# Patient Record
Sex: FEMALE | Race: WHITE | NOT HISPANIC OR LATINO | Employment: FULL TIME | URBAN - METROPOLITAN AREA
[De-identification: names, ages, dates, MRNs, and addresses within clinical notes are randomized per-mention and may not be internally consistent; named-entity substitution may affect disease eponyms.]

---

## 2017-01-11 ENCOUNTER — GENERIC CONVERSION - ENCOUNTER (OUTPATIENT)
Dept: OTHER | Facility: OTHER | Age: 37
End: 2017-01-11

## 2017-03-08 ENCOUNTER — TRANSCRIBE ORDERS (OUTPATIENT)
Dept: ADMINISTRATIVE | Facility: HOSPITAL | Age: 37
End: 2017-03-08

## 2017-03-08 ENCOUNTER — GENERIC CONVERSION - ENCOUNTER (OUTPATIENT)
Dept: OTHER | Facility: OTHER | Age: 37
End: 2017-03-08

## 2017-03-08 ENCOUNTER — APPOINTMENT (OUTPATIENT)
Dept: LAB | Facility: MEDICAL CENTER | Age: 37
End: 2017-03-08
Payer: COMMERCIAL

## 2017-03-08 DIAGNOSIS — E10.65 TYPE 1 DIABETES MELLITUS WITH HYPERGLYCEMIA (HCC): ICD-10-CM

## 2017-03-08 DIAGNOSIS — E55.9 VITAMIN D DEFICIENCY: ICD-10-CM

## 2017-03-08 DIAGNOSIS — E16.2 HYPOGLYCEMIA: ICD-10-CM

## 2017-03-08 LAB
25(OH)D3 SERPL-MCNC: 27.4 NG/ML (ref 30–100)
ALBUMIN SERPL BCP-MCNC: 4.1 G/DL (ref 3.5–5)
ANION GAP SERPL CALCULATED.3IONS-SCNC: 7 MMOL/L (ref 4–13)
BUN SERPL-MCNC: 9 MG/DL (ref 5–25)
CALCIUM SERPL-MCNC: 8.6 MG/DL (ref 8.3–10.1)
CHLORIDE SERPL-SCNC: 103 MMOL/L (ref 100–108)
CO2 SERPL-SCNC: 30 MMOL/L (ref 21–32)
CREAT SERPL-MCNC: 0.68 MG/DL (ref 0.6–1.3)
EST. AVERAGE GLUCOSE BLD GHB EST-MCNC: 189 MG/DL
GFR SERPL CREATININE-BSD FRML MDRD: >60 ML/MIN/1.73SQ M
GLUCOSE SERPL-MCNC: 154 MG/DL (ref 65–140)
HBA1C MFR BLD: 8.2 % (ref 4.2–6.3)
PHOSPHATE SERPL-MCNC: 3.2 MG/DL (ref 2.7–4.5)
POTASSIUM SERPL-SCNC: 4.1 MMOL/L (ref 3.5–5.3)
PTH-INTACT SERPL-MCNC: 59.7 PG/ML (ref 14–72)
SODIUM SERPL-SCNC: 140 MMOL/L (ref 136–145)

## 2017-03-08 PROCEDURE — 82306 VITAMIN D 25 HYDROXY: CPT

## 2017-03-08 PROCEDURE — 83036 HEMOGLOBIN GLYCOSYLATED A1C: CPT

## 2017-03-08 PROCEDURE — 36415 COLL VENOUS BLD VENIPUNCTURE: CPT

## 2017-03-08 PROCEDURE — 80069 RENAL FUNCTION PANEL: CPT

## 2017-03-08 PROCEDURE — 83970 ASSAY OF PARATHORMONE: CPT

## 2017-03-13 ENCOUNTER — ALLSCRIPTS OFFICE VISIT (OUTPATIENT)
Dept: OTHER | Facility: OTHER | Age: 37
End: 2017-03-13

## 2017-03-13 LAB — GLUCOSE SERPL-MCNC: 241 MG/DL

## 2017-04-09 ENCOUNTER — OFFICE VISIT (OUTPATIENT)
Dept: URGENT CARE | Facility: MEDICAL CENTER | Age: 37
End: 2017-04-09
Payer: COMMERCIAL

## 2017-04-09 PROCEDURE — 99213 OFFICE O/P EST LOW 20 MIN: CPT

## 2017-06-13 DIAGNOSIS — Z96.41 PRESENCE OF INSULIN PUMP: ICD-10-CM

## 2017-06-13 DIAGNOSIS — E55.9 VITAMIN D DEFICIENCY: ICD-10-CM

## 2017-06-13 DIAGNOSIS — E10.65 TYPE 1 DIABETES MELLITUS WITH HYPERGLYCEMIA (HCC): ICD-10-CM

## 2017-06-13 DIAGNOSIS — E16.2 HYPOGLYCEMIA: ICD-10-CM

## 2017-06-13 DIAGNOSIS — F32.9 MAJOR DEPRESSIVE DISORDER, SINGLE EPISODE: ICD-10-CM

## 2017-06-13 DIAGNOSIS — K90.0 CELIAC DISEASE: ICD-10-CM

## 2017-09-13 ENCOUNTER — GENERIC CONVERSION - ENCOUNTER (OUTPATIENT)
Dept: OTHER | Facility: OTHER | Age: 37
End: 2017-09-13

## 2017-10-17 ENCOUNTER — LAB CONVERSION - ENCOUNTER (OUTPATIENT)
Dept: OTHER | Facility: OTHER | Age: 37
End: 2017-10-17

## 2017-10-17 LAB
25(OH)D3 SERPL-MCNC: 49 NG/ML (ref 30–100)
ALBUMIN SERPL BCP-MCNC: 4.6 G/DL (ref 3.6–5.1)
BUN SERPL-MCNC: 8 MG/DL (ref 7–25)
BUN/CREA RATIO (HISTORICAL): ABNORMAL (CALC) (ref 6–22)
CALCIUM SERPL-MCNC: 9.5 MG/DL (ref 8.6–10.2)
CALCIUM SERPL-MCNC: 9.5 MG/DL (ref 8.6–10.2)
CHLORIDE SERPL-SCNC: 103 MMOL/L (ref 98–110)
CHOLEST SERPL-MCNC: 143 MG/DL
CHOLEST/HDLC SERPL: 2.6 (CALC)
CO2 SERPL-SCNC: 29 MMOL/L (ref 20–31)
CREAT SERPL-MCNC: 0.73 MG/DL (ref 0.5–1.1)
CREATININE, RANDOM URINE (HISTORICAL): 189 MG/DL (ref 20–320)
EGFR AFRICAN AMERICAN (HISTORICAL): 123 ML/MIN/1.73M2
EGFR-AMERICAN CALC (HISTORICAL): 106 ML/MIN/1.73M2
GLUCOSE (HISTORICAL): 106 MG/DL (ref 65–99)
HBA1C MFR BLD HPLC: 8.6 % OF TOTAL HGB
HDLC SERPL-MCNC: 55 MG/DL
LDL CHOLESTEROL (HISTORICAL): 75 MG/DL (CALC)
MAGNESIUM, UR (HISTORICAL): 0.7 MG/DL
MICROALBUMIN/CREATININE RATIO (HISTORICAL): 4 MCG/MG CREAT
NON-HDL-CHOL (CHOL-HDL) (HISTORICAL): 88 MG/DL (CALC)
PHOSPHATE SERPL-MCNC: 3.4 MG/DL (ref 2.5–4.5)
POTASSIUM SERPL-SCNC: 4.1 MMOL/L (ref 3.5–5.3)
PTH-INTACT SERPL-MCNC: 38 PG/ML (ref 14–64)
SODIUM SERPL-SCNC: 139 MMOL/L (ref 135–146)
T4 FREE SERPL-MCNC: 1.1 NG/DL (ref 0.8–1.8)
TRIGL SERPL-MCNC: 53 MG/DL
TSH SERPL DL<=0.05 MIU/L-ACNC: 1.94 MIU/L

## 2017-10-19 ENCOUNTER — ALLSCRIPTS OFFICE VISIT (OUTPATIENT)
Dept: OTHER | Facility: OTHER | Age: 37
End: 2017-10-19

## 2017-10-24 NOTE — PROGRESS NOTES
Assessment  1  Type 1 diabetes mellitus with hyperglycemia, with long-term current use of insulin   (250 01,790 29) (E10 65)   2  Hypoglycemia (251 2) (E16 2)   3  Insulin pump in place (V45 85) (Z96 41)   4  Vitamin D deficiency disease (268 9) (E55 9)    Plan  Hypoglycemia    · Glucagon Emergency 1 MG Injection Kit; USE AS DIRECTED   Rx By: Deborah Durán; Dispense: 0 Days ; #:2 Kit; Refill: 1;For: Hypoglycemia; MAGGY = N; Sent To: [facility]/PHARMACY #[id number] Hypoglycemia, Insulin pump in place, Type 1 diabetes mellitus with hyperglycemia, with  long-term current use of insulin, Vitamin D deficiency disease    · (1) HEMOGLOBIN A1C; Status:Active; Requested QIF:[id number]; Perform:[facility]; FPZ:[id number];FOQUJUE;NXY:[id number], Insulin pump in place, Type 1 diabetes mellitus with hyperglycemia, with long-term current use of insulin, Vitamin D deficiency disease; Ordered By:Jenny Gill;   · (1) RENAL FUNCTION PANEL; Status:Active; Requested QZU:[id number]; Perform:[facility]; CKP:[id number];IAVRHHB;WBS:[id number], Insulin pump in place, Type 1 diabetes mellitus with hyperglycemia, with long-term current use of insulin, Vitamin D deficiency disease; Ordered By:Jenny Gill;   · Follow-up visit in 3 months Evaluation and Treatment  Follow-up  Status: Hold For -  Scheduling  Requested for: [id number]   Ordered; For: Hypoglycemia, Insulin pump in place, Type 1 diabetes mellitus with hyperglycemia, with long-term current use of insulin, Vitamin D deficiency disease; Ordered By: Deborah Durán Performed:  Due: 24Oct2017  Type 1 diabetes mellitus with hyperglycemia, with long-term current use of insulin    · Ella Contour Next Test In Vitro Strip; CHECK 8 TIMES DAILY AS DIRECTED   Rx By: Deborah Durán; Dispense: 90 Days ; #:8 X 100 Strip Box;  Refill: 1;For: Type 1 diabetes mellitus with hyperglycemia, with long-term current use of insulin; MAGGY = N; Verified Transmission to [facility]/PHARMACY #[id number]; Msg to Pharmacy: COMPATIBLE WITH NEW INSULIN PUMP; Last Updated By: [employer] Og; 10/19/2017 11:27:42 AM    Discussion/Summary  Discussion Summary:   1  Type 1 diabetes with hyperglycemia and hypoglycemia with long term insulin/insulin pump in place- last A1c 8 6 not goal, keep appointment with educator to set up new Medtronic 670 insulin pump, continue Novolog via insulin pump  Try to dose insulin before meals  Always have glucose available for hypoglycemia, use 15 by 15 rule  Glucagon reordered  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, works with nurses  Schedule yearly eye exam and send us a copy of report  Next due in January 2018  BP at goal  Urine microalbumin normal  TSH and free T4 normal   Vitamin D deficiency- last D level normal without supplements  3 months  Counseling Documentation With Imm: The patient was counseled regarding diagnostic results,-- instructions for management,-- risk factor reductions,-- patient and family education,-- impressions,-- importance of compliance with treatment  Goals and Barriers: The patient has the current Goals: As above  Patient's Capacity to Self-Care: Patient is able to Self-Care  Medication SE Review and Pt Understands Tx: Possible side effects of new medications were reviewed with the patient/guardian today  The treatment plan was reviewed with the patient/guardian  The patient/guardian understands and agrees with the treatment plan      Chief Complaint  Chief Complaint Free Text Note Form: f/u diabetes   Chief Complaint Chronic Condition [facility]: Patient is here today for follow up of chronic conditions described in HPI  History of Present Illness  HPI: Pedrito Marlow is a 41 yo female here for follow-up type 1 diabetes and vitamin D deficiency  Quit smoking 9/9/17, using Nicotine patch  On Novolog via Medtronic 630G insulin pump and today will upgrade today to 670  D deficiency- not on supplements, last D level normal  disease- follows gluten free diet, follows GI  Vitamin D Deficiency: The patient is being seen for follow-up of vitamin D deficiency  Disease type: vitamin D insufficiency  Recent laboratory results: date 10/2017, 25-hydroxyvitamin D 49 ng/mL  Current treatment includes dietary calcium  Symptoms:  fatigue, but-- no bone pain,-- no muscle pain-- and-- no muscle weakness  Diabetes: The patient is being seen for routine follow-up of Diabetes Mellitus 1  The HbA1c was 8 6% performed on 10/2017  Current treatment includes NovoLog  See Medication List for current medication(s)  See Medication List for dosage(s)  Source of information reported and indicates that the patient checks her blood sugar 4-7 time(s) per day  , has entered carbs under glucose readings  Diabetes education performed   Topics covered in the education included reviewed hyperglycemic and hypoglycemic signs and symptoms-- and-- blood glucose monitoring  By report, there is fair compliance with treatment-- and-- poor symptom control  Current pertinent lifestyle factors include a previous history of smoking years-- and-- recent weight gain, but-- no obesity,-- no past or present history of currently smoking per day-- and-- no tobacco use  Symptoms reported by the patient include fatigue, but-- no abdominal pain,-- no extremity pain,-- no polydipsia,-- no extremity numbness,-- no polyuria,-- no extremity paresthesias,-- no blurred vision,-- no insomnia,-- no vomiting,-- no recent weight gain,-- no nausea,-- no recent weight loss,-- no polyphagia,-- no edema,-- no nocturia,-- no dyspnea-- and-- no chest pain  Hypoglycemia symptoms include anxiety,-- confusion-- and-- sweating  Hypoglycemia episodes are moderate in severity and managed by drinking juice  (lowest 50's) Pertinent Medical and Social History: hyperglycemia, but-- no thyroid disorder  Review of Systems  ROS Reviewed:   ROS reviewed         Endo Adult ROS Female Established v2 Update - [facility]:   Constitutional/General: recent weight gain,-- no recent weight loss,-- poor energy/fatigue,-- no increased energy level,-- no insomnia/sleep problems,-- no fever-- and-- no feeling weak  Breasts: no nipple discharge  Heart: no high blood pressure,-- no chest pain/tightness,-- no rapid/racing heart rate-- and-- no palpitations  Genitourinary - Urinary: no frequent urination,-- no excess urination-- and-- no urinating during the night  Eyes: no blurred vision,-- no double vision,-- no bulging eyes,-- no gritty/scratchy eyes-- and-- no excessive tearing  Mouth / Throat: no hoarseness-- and-- no difficulty swallowing  Neck: no lumps,-- no swollen glands,-- no neck pain,-- no neck stiffness-- and-- no enlarged thyroid  Respiratory: no wheezing,-- no asthma-- and-- no persistent cough  Musculoskeletal: no muscle aches/pain,-- no joint aches/pain-- and-- no muscle weakness  Skin & Hair: no dry skin,-- acne,-- the hair texture was not oily,-- no hair loss-- and-- no excessive hair growth  Gastrointestinal: no constipation,-- no diarrhea,-- no waking at night to drink-- and-- no stomach ache  Neurological: no blackouts,-- no weakness-- and-- no tremors  Reproductive: mood swings--   frequency of period is not applicable  -- duration of period is not applicable  -- Date of last menstruation is not applicable  -- regular periods are not applicable  -- discomfort with periods is not applicable  -- excessive bleeding during period is not applicable  Endocrine: no feeling hot frequently,-- no feeling cold frequently,-- no shifts between feeling hot and cold,-- no cold hands or feet,-- no excessive sweating,-- no thyroid problems,-- blood sugar problems,-- no excessive thirst,-- no excessive hunger,-- no change in shoe size,-- no nausea or vomiting-- and-- no shaky hands  Active Problems  1  Abdominal pain, RUQ (789 01) (R10 11)   2  Abnormal glucose (790 29) (R73 09)   3  Acute bronchitis (466 0) (J20 9)   4   Acute maxillary sinusitis (461 0) (J01 00)   5  Acute Otitis Externa Of The Right Ear (380 22)   6  Acute sinusitis (461 9) (J01 90)   7  Anxiety (300 00) (F41 9)   8  Asthmatic bronchitis with exacerbation (493 92) (J45 901)   9  Celiac disease (579 0) (K90 0)   10  Controlled diabetes mellitus type II without complication (061 76) (U43 4)   11  Cough (786 2) (R05)   12  Depression (311) (F32 9)   13  Dizziness (780 4) (R42)   14  Encounter for smoking cessation counseling (V65 42,305 1) (Z71 6,Z72 0)   15  Exposure to influenza (V01 79) (Z20 828)   16  Fatigue (780 79) (R53 83)   17  Hypoglycemia (251 2) (E16 2)   18  Insulin pump in place (V45 85) (Z96 41)   19  Noncompliance (V15 81) (Z91 19)   20  Other acute sinusitis (461 8) (J01 80)   21  Pap smear for cervical cancer screening (V76 2) (Z12 4)   22  Type 1 diabetes mellitus with hyperglycemia, with long-term current use of insulin    (250 01,790 29) (E10 65)   23  Type I diabetes mellitus, uncontrolled (250 03) (E10 65)   24  Upper respiratory tract infection, unspecified type (465 9) (J06 9)   25  Vertigo (780 4) (R42)   26  Vitamin D deficiency disease (268 9) (E55 9)    Past Medical History  1  History of Depression (311) (F32 9)   2  Encounter for smoking cessation counseling (V65 42,305 1) (Z71 6,Z72 0)   3  History of hypertension (V12 59) (Z86 79)  Active Problems And Past Medical History Reviewed: The active problems and past medical history were reviewed and updated today  Surgical History  1  Denied: History Of Prior Surgery  Surgical History Reviewed: The surgical history was reviewed and updated today  Family History  Mother    1  No pertinent family history  Maternal Grandfather    2  Family history of Malignant Pancreatic Neoplasm  Paternal Grandfather    3  Family history of Stroke Syndrome (V17 1)  Family History    4  Family history of Family Health Status Of Father - Alive   5   Family history of Family Health Status Of Mother - Alive  Family History Reviewed: The family history was reviewed and updated today  Social History   · Being A Social Drinker   · Caffeine Use   · Denied: History of Drug Use   · Quit smoking within past year (V15 82) (I32 284)    Current Meds   1  Ella Contour Next Test In Vitro Strip; CHECK 8 TIMES DAILY AS DIRECTED; Therapy: 21Apr2017 to (Evaluate:18Oct2017)  Requested for: 14Omg0374; Last   Rx:21Apr2017 Ordered   2  BD Insulin Syringe Ultrafine 31G X 5/16 1 ML Miscellaneous; USE ONE DAILY; Therapy: 79YUB7539 to (Evaluate:13Jun2015)  Requested for: 97WMY2869; Last   Rx:81Hgj8261 Ordered   3  Glucagon Emergency 1 MG Injection Kit; USE AS DIRECTED; Therapy: 81IEG1447 to (Last Rx:45Cmf5697)  Requested for: 48Mja4822 Ordered   4  Levemir FlexTouch 100 UNIT/ML Subcutaneous Solution Pen-injector; Inject SQ 10 units   at 8am and 10 units at 8pm in case of pump failure; Therapy: 78KBI3436 to (Evaluate:06Jan2017)  Requested for: 77QVU4118; Last   Rx:57Qyy1573 Ordered   5  Mirena IUD; Therapy: [phone]) to Recorded   6  Nova Max Plus [employer]; use as directed; Therapy: 21Mar2015 to (Hannah Long)  Requested for: 17AYA4455; Last   Rx:83Mii8635; Status: [address] moira Alvarado Ordered   7  NovoLOG 100 UNIT/ML Subcutaneous Solution; USE UP TO 50 UNITS VIA PUMP DAILY; Therapy: 80PAA6657 to (DPFHRWRL:85QUW5762)  Requested for: 70Svn3549; Last   Rx:90Kwq8333 Ordered   8  NovoLOG FlexPen 100 UNIT/ML Subcutaneous Solution Pen-injector; Inject SQ with   meals 1 unit per 18 carbs  up to 50 units daily as   directed by provider in case of pump failure; Therapy: 98BVH8945 to (Evaluate:06Jan2017)  Requested for: 64AVL4534; Last   Rx:90Gya3308; Status: ACTIVE - Renewal Denied Ordered   9  Vitamin D 2000 UNIT Oral Capsule; 2 CAPSULES DAILY; Therapy: 69DZO6356 to Recorded  Medication List Reviewed: The medication list was reviewed and updated today  Allergies  1  No Known Drug Allergies    Vitals  Vital Signs    Recorded: 92XII5508 10:52AM   Heart Rate 78   Systolic 162   Diastolic 68   Height 5 ft 1 in   Weight 132 lb    BMI Calculated 24 94   BSA Calculated 1 58     Physical Exam    Constitutional   General appearance: No acute distress, well appearing and well nourished  Eyes   Conjunctiva and lids: No swelling, erythema, or discharge  Pupils: Equal, round and reactive to light  The sclera are anicteric  Extraocular movements are intact  Ears, Nose, Mouth, and Throat   External inspection of ears, nose and lips: Normal     Oropharynx: Normal with no erythema, edema, exudate or lesions  Exam of Head: The head is atraumatic and normocephalic  Neck: The neck is supple  The thyroid is normal in size with no palpable nodules  Pulmonary   Auscultation of lungs: Clear to auscultation bilaterally with normal chest expansion  Cardiovascular   Auscultation of heart: Normal rate and rhythm with no murmurs, gallops or rubs  Examination of pulses: Dorsalis pedal pulses are +2 and equal bilaterally  Examination of carotids: No bruit    Abdomen   Abdomen: Abdomen is soft, non-tender with normal bowel sounds  Lymphatic   Palpation of lymph nodes: No supraclavicular or suboccipital lymphadenopathy  Musculoskeletal   Inspection/palpation of joints, bones, and muscles: Muscle bulk and tone is normal     Skin   Skin and subcutaneous tissue: Normal skin temperature and color  Neurologic   Reflexes: 2+ and symmetric  Motor Strength: Strength is 5/5 bilaterally      Psychiatric   Orientation to person, place and time: Normal     Mood and affect: Affect and attention span are normal        Results/Data  (Q) PTH, INTACT AND CALCIUM 14Oct2017 08:19AM Nakul Speed     Test Name Result Flag Reference   PARATHYROID HORMONE,$INTACT 38 pg/mL  14-64   Interpretive Guide    Intact PTH           Calcium  ------------------    ----------           -------  Normal Parathyroid    Normal               Normal  Hypoparathyroidism    Low or Low Normal    Low  Hyperparathyroidism     Primary            Normal or High       High     Secondary          High                 Normal or Low     Tertiary           High                 High  Non-Parathyroid     Hypercalcemia      Low or Low Normal    High   CALCIUM 9 5 mg/dL  8 6-10 2     (1) LIPID PANEL, FASTING 14Oct2017 08:19AM Nava Barker     Test Name Result Flag Reference   CHOLESTEROL, TOTAL 143 mg/dL  <200   HDL CHOLESTEROL 55 mg/dL  >00   TRIGLICERIDES 53 mg/dL  <392   LDL-CHOLESTEROL 75 mg/dL (calc)     Reference range: <100     Desirable range <100 mg/dL for patients with CHD or  diabetes and <70 mg/dL for diabetic patients with  known heart disease  LDL-C is now calculated using the Ramu-Velazuqez   calculation, which is a validated novel method providing   better accuracy than the Friedewald equation in the   estimation of LDL-C  Adeline Evans  [facility]4;847(30): 0017-0687   (http://[employer]/faq/DVR675)   CHOL/HDLC RATIO 2 6 (calc)  <5 0   NON HDL CHOLESTEROL 88 mg/dL (calc)  <130   For patients with diabetes plus 1 major ASCVD risk   factor, treating to a non-HDL-C goal of <100 mg/dL   (LDL-C of <70 mg/dL) is considered a therapeutic   option  (1) RENAL FUNCTION PANEL 14Oct2017 08:19AM Nava Barker     Test Name Result Flag Reference   GLUCOSE 106 mg/dL H 65-99   Fasting reference interval     For someone without known diabetes, a glucose value  between 100 and 125 mg/dL is consistent with  prediabetes and should be confirmed with a  follow-up test    UREA NITROGEN (BUN) 8 mg/dL  7-25   CREATININE 0 73 mg/dL  0 50-1 10   eGFR NON-AFR   AMERICAN 106 mL/min/1 73m2  > OR = 60   eGFR AFRICAN AMERICAN 123 mL/min/1 73m2  > OR = 60   BUN/CREATININE RATIO   8-19   NOT APPLICABLE (calc)   SODIUM 139 mmol/L  135-146   POTASSIUM 4 1 mmol/L  3 5-5 3   CHLORIDE 103 mmol/L     CARBON DIOXIDE 29 mmol/L  20-31   CALCIUM 9 5 mg/dL  8 6-10 2   PHOSPHATE (AS PHOSPHORUS) 3 4 mg/dL  2 5-4 5   ALBUMIN 4 6 g/dL  3 6-5 1     (Q) MICROALBUMIN, RANDOM URINE (W/CREATININE) 26YQA9657 08:19AM Mervin Desir     Test Name Result Flag Reference   CREATININE, RANDOM URINE 189 mg/dL     MICROALBUMIN 0 7 mg/dL     Reference Range  Not established   MICROALBUMIN/CREATININE$RATIO, RANDOM URINE 4 mcg/mg creat  <30   The ADA defines abnormalities in albumin  excretion as follows:     Category         Result (mcg/mg creatinine)     Normal                    <30  Microalbuminuria            Clinical albuminuria   > OR = 300     The ADA recommends that at least two of three  specimens collected within a 3-6 month period be  abnormal before considering a patient to be  within a diagnostic category  (1) T4, FREE 14Oct2017 08:19AM Mervin Desir     Test Name Result Flag Reference   T4, FREE 1 1 ng/dL  0 8-1 8     (Q) TSH, 3RD GENERATION 14Oct2017 08:19AM Mervin Desir     Test Name Result Flag Reference   TSH 1 94 mIU/L     Reference Range                         > or = 20 Years  0 40-4 50                              Pregnancy Ranges            First trimester    0 26-2 66            Second trimester   0 55-2 73            Third trimester    0 43-2 91     *(Q) VITAMIN D, 25-HYDROXY, LC/MS/MS 14Oct2017 08:19AM Mervin Desir     Test Name Result Flag Reference   VITAMIN D, 25-OH, TOTAL 49 ng/mL     Vitamin D Status         25-OH Vitamin D:     Deficiency:                    <20 ng/mL  Insufficiency:             20 - 29 ng/mL  Optimal:                 > or = 30 ng/mL     For 25-OH Vitamin D testing on patients on   D2-supplementation and patients for whom quantitation   of D2 and D3 fractions is required, the QuestAssureD(TM)  25-OH VIT D, (D2,D3), LC/MS/MS is recommended: order   code 51302 (patients >2yrs)  For more information on this test, go to:  http://[employer]faq/UAO464  (This link is being provided for   informational/educational purposes only )     (Q) HEMOGLOBIN A1c 14Oct2017 08:19AM Herson Dong   REPORT COMMENT:  NO INS CARD  FASTING:YES     Test Name Result Flag Reference   HEMOGLOBIN A1c 8 6 % of total Hgb H <5 7   For someone without known diabetes, a hemoglobin A1c  value of 6 5% or greater indicates that they may have   diabetes and this should be confirmed with a follow-up   test      For someone with known diabetes, a value <7% indicates   that their diabetes is well controlled and a value   greater than or equal to 7% indicates suboptimal   control  A1c targets should be individualized based on   duration of diabetes, age, comorbid conditions, and   other considerations  Currently, no consensus exists regarding use of  hemoglobin A1c for diagnosis of diabetes for children         Signatures   Electronically signed by : Sy Oneill; Oct 19 2017 11:42AM EST                       (Author)    Electronically signed by : LILA Garrett ; Oct 23 2017 12:37PM EST

## 2018-01-10 NOTE — RESULT NOTES
Verified Results  DSMT/MNT Time Record [id number] 01:51PM Karen Holden     Test Name Result Flag Reference   Date of Service 12/19/16     Start - Stop Time 9:00 am - 9:45 am     Total MInutes 45     Group Or Individual Instruction ind

## 2018-01-10 NOTE — RESULT NOTES
Message   Reviewed at visit  Verified Results  (1) HEMOGLOBIN A1C 08Aug2016 04:01PM 1-4 All Order Number: [id number]     Test Name Result Flag Reference   HEMOGLOBIN A1C 9 4 % H 4 2-6 3   EST  AVG  GLUCOSE 223 mg/dl       (1) COMPREHENSIVE METABOLIC PANEL 46UWH8362 37:87ND 1-4 All Order Number: [id number]     Test Name Result Flag Reference   GLUCOSE,RANDM 292 mg/dL H    If the patient is fasting, the ADA then defines impaired fasting glucose as > 100 mg/dL and diabetes as > or equal to 123 mg/dL  SODIUM 135 mmol/L L 136-145   POTASSIUM 4 2 mmol/L  3 5-5 3   CHLORIDE 101 mmol/L  100-108   CARBON DIOXIDE 26 mmol/L  21-32   ANION GAP (CALC) 8 mmol/L  4-13   BLOOD UREA NITROGEN 11 mg/dL  5-25   CREATININE 0 75 mg/dL  0 60-1 30   Standardized to IDMS reference method   CALCIUM 9 0 mg/dL  8 3-10 1   BILI, TOTAL 0 35 mg/dL  0 20-1 00   ALK PHOSPHATAS 56 U/L     ALT (SGPT) 22 U/L  12-78   AST(SGOT) 13 U/L  5-45   ALBUMIN 4 2 g/dL  3 5-5 0   TOTAL PROTEIN 7 2 g/dL  6 4-8 2   eGFR Non-African American      >60 0 ml/min/1 73sq [facility] Disease Education Program recommendations are as follows:  GFR calculation is accurate only with a steady state creatinine  Chronic Kidney disease less than 60 ml/min/1 73 sq  meters  Kidney failure less than 15 ml/min/1 73 sq  meters  (1) T4, FREE 81Pkb2227 04:01PM 1-4 All Order Number: [id number]     Test Name Result Flag Reference   T4,FREE 0 84 ng/dL  0 76-1 46     (1) TSH 08Aug2016 04:01PM 1-4 All Order Number: [id number]     Test Name Result Flag Reference   TSH 2 010 uIU/mL  0 358-3 740   Patients undergoing fluorescein dye angiography may retain small amounts of fluorescein in the body for 48-72 hours post procedure  Samples containing fluorescein can produce falsely depressed TSH values  If the patient had this procedure,a specimen should be resubmitted post fluorescein clearance            The recommended reference ranges for TSH during pregnancy are as follows:  First trimester 0 1 to 2 5 uIU/mL  Second trimester  0 2 to 3 0 uIU/mL  Third trimester 0 3 to 3 0 uIU/m

## 2018-01-11 NOTE — PROGRESS NOTES
Chief Complaint  Chief Complaint Free Text Note Form: Follow up appointment - DexCom sensor      Discussion/Summary  Dexcom Training Form- Arthur Pennyay:   Dexcom Training Checklist   Device Model:  Understanding Glucose Sensing   Difference between sensor glucose and blood glucose  Calibration process and frequency  Difference between sensor glucose and blood glucose  Device Set-up   Initial 3-hour charging period, lasts 3-5 days  Set date and time  Set Transmitter ID if necessary  Ensure transmitter and receive are communicating (down arrow icon in bottom right corner of display  Sensor Insertion   Identify abdominal insertion sites; at least 3 inches from injection or pump infusion site  Cleanse skin with alcohol only, no barrier or adhesive wipes  Check sensor expiration date  Remove release paper from sensor pod dressing  Demonstrate correct (horizontal) positioning of sensor pod on skin  Remove safety lock, push plunger with fingers above sensor colla  Pull back collar, disengage applicator from sensor pod  Clean transmitter with alcohol and allow to dry  Place transmitter into sensor pod  Move latch forward until 2 clicks are heard  Twist off latch  Starting Sensor Session   Verify down arrow icon in lower right of  display  Press OK  Scroll down to Supersonic  Press OK  Screen will indicate 2-hour start-up period  Calibration   Take 2 fingerstick readings when the double blood-drop appears on screen  Wash hands prior to testing  Use same meter for all calibrations  Fingersticks only; no alternate sites  Enter each reading by scrolling to "Enter BG", entering number, the press OK to confirm  Repeat  Enter one callibration at least every 12 hours  Enter reading within 5 minutes of blood test     Don't calibrate with down arrow or ? ?? on display, or if blood glucose is < 40 or > 400     Reading Continuous Glucose Monitring Data   1, 3, 6, 12, & 24-hour graphs  Trand arrows; based on past 15-20 minutes  Terminating Sensor Session   Automatic shut-off after 7 days: "Change sensor now"  Manual shut-off before 7 days: "Stop Sensor" menu option  Loosen adhesive and peel off sensor pod  Placing pod on flat surface, remove transmitter  Place transmitter in a safe place if not starting a new sensor session immediately  Safety Maintenance   Avoid acetaminophen-containg medications  Remove before MRI  Clean  with slightly damp clot  Clean transmitter with alcohol between uses  Certified Sensor Trainer: Stephane Sanchez   Date: 12/19/16   Setting Threshold Alerts   Main menu--> Hi/Lo Alerts  Use Up or Down arrows to set glucose levels  Set snooze times:  Automatic Low Glucose Alarm at 55, cannot be changed or disabled  Enable Out of Range alert if desired  Time: 20  Troubleshooting   Blood drop(s) in status box = need to calibrate  Error codes  Sensor failure  Comments: Inserted DexCom sensor herself  Downloaded clarity to her phone and will download to her home computer  Downloaded Dexcom and report sent to physician  Viewed reports with her  Patient Education Record DSMT:   PATIENT EDUCATION RECORD   Indication for Services: type 1 Diabetes Mellitus  She is ready to learn  She has no barriers to learning  Continous Glucose Monitoring:   Discussed the difference between sensor and meter glucose: Method: Instruction  Response: Verbalizes Understanding   Discussed that finger stick confirmation is required: Method: Instruction  Response: Verbalizes UnderstandingResponse: Verbalizes Understanding   Discussed sensor insertion and start: Method: Instruction  Response: Verbalizes Understanding   Discussed calibration procedure and schedule: Method: Instruction  Response: Verbalizes Understanding   Discussed trouble shooting alarms: Method: Instruction  Response: Verbalizes Understanding   Discussed downloading monitor and sending reports: Method: Instruction  Response: Verbalizes Understanding   Discussed reading graphs and trend information: Method: Instruction  Response: Verbalizes Understanding   Discussed considerations for showering, swimming, travel, and X-Ray: Method: Instruction  Response: Verbalizes Understanding   Discussed pain, bruising, bleeding, irritation, and infection: Method: Instruction  Response: Verbalizes Understanding   Discussed sensor removal and disposal: Method: Instruction  Response: Verbalizes Understanding   Sensor System: Dexcom   Alarms: High 240 and Low 80  Future Appointments    Date/Time Provider Specialty Site   03/10/2017 08:50 AM LILA Dillard   [facility]     Signatures   Electronically signed by : BEATRICE Taylor; Dec 20 2016  1:33PM EST                       (Author)

## 2018-01-12 NOTE — PROGRESS NOTES
Plan    1  DSMT/MNT Time Record; Status:Complete;   Done: 43QOH3426 01:31PM    Discussion/Summary    PATIENT EDUCATION RECORD   Indication for Services: type 1 Diabetes Mellitus and Celiac  She is ready to learn  She has no barriers to learning  Diabetes Disease Process:   Discussed treatment goals: Method: Instruction  Response: Verbalizes Understanding   Discussed benefits of control: Method: Instruction  Response: Verbalizes Understanding   Healthy Eating:   Discussed importance of meal timing/consistency: Method: Instruction and Handout  Response: Verbalizes Understanding   Discussed nutrient types ( Cho/Fat/Protein): Method: Instruction and Handout  Response: Verbalizes Understanding   Provided meal planning: Method: Instruction and Handout  Response: Verbalizes Understanding Her current weight is 133  Her keal needs are 1234  Her CHO's per meal are 108 g/day 35% carb  He/She was provided a meal plan for: weight loss  Discussed weight management/weight loss: Method: Instruction and Handout  Response: Verbalizes Understanding   Her current BMI 25  Discussed basic carbohydrate counting: Method: Instruction and Handout  Response: Returns Demonstration   Being Active:   Stated the benefits of exercise: Method: Instruction  Response: Verbalizes Understanding   Discussed proper exercise program: Method: Instruction  Response: Verbalizes Understanding   Taking Medication:   Discussed basal-bolus concept  Method: Instruction  Response: [employer]  She was given Fast 15 List   Problem Solving: She is on medications that cause hypoglycemia, She is able to state the symptoms, prevention, and treatment of hypoglycemia   Discussed treatment: Method: Instruction and Handout  Response: Verbalizes Instruction   Reducing Risk:   Discussed long term complications- prevention, assessment, and monitoring  Method: Instruction  Response: [employer]     Healthy Coping Class:   Discussed the effects of stress on diabetes: Method: Instruction  Response: Verbalizes Understanding   Discussed ways to manage stress: Method: Instruction  Response: Verbalizes Understanding   Discussed recognition of depression: Method: Instruction  Response: Verbalizes Understanding   Identified lifestyle behaviors that need to change: Method: Instruction  Response: Verbalizes Understanding   Discussed motivation to change: Method: Instruction  Response: Verbalizes Understanding   Identified goals for behavior change: Method: Instruction  Response: Verbalizes Understanding   Discussed strategies for change: Method: Instruction  Response: Verbalizes Understanding   She participated in goal setting  Will be available for follow up as needed Recommended patient see: TJ   She was given the following educational materials: Personal Meal Plan 1234 calories and Calorie and Carbohydrate Tracking Books/Websites/Phone Apps   Chief Complaint  Patient is here today for Medical Nutrition Therapy for T1DM      History of Present Illness  Patient is a 30 y/o female with uncontrolled T1DM, Celiac Disease  Patient is non compliant with diabetes self care/management  Her pump download shows 64% of readings are high >240; many in 400's; some hypoglycemia due to overcorrection or not eating  Basal is 63%, bolus 9% and correction 28%  Patient stated that she has problems with being consistent and motivated  She suffers from depression and is seeing a therapist once per week  She is a , 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 and often feels she needs to be perfect and abandons self care when she is not  We discussed ways to get back on track and allow for inconsistencies  Some motivational techniques were shared  She is getting a CGM which may provide motivational feedback for staying on track  She appears to know how to carb count but admits to not always bolusing for carbs eaten, particularly when she has dietary indiscretions   She admits to having days when she eats mostly candy and sweets  We discussed meal planning/carb counting and healthy choices  She was given a low carb (35%) baseline meal plan  Stressed need to bolus for carbs even when she is eating less than healthy food choices  This is her initial assessment    Present at session: patient    She has no special learning needs  Her caloric needs are 1234  Recent weight change: +2    Patient  shops for food  Patient  cooks the food  Exercise routine:  Not consistent   She eats breakfast at  AM Skips or Protein shake   She snacks at AM Candy, popcorn or greek yogurt with berries   She eats lunch at  PM Skips or salad with chicken and vegetables   She snacks at PM 1 pc  fruit or GF granola bar   She eats dinner at  PM Chicken, vegetables, brown rice   She snacks at at bedtime Large ice cream     NUTRITION DIAGNOSES   Undesirable Food Choices   Undesirable food choices related to: Psychological causes such as depression and disordered eating  As evidenced by: Conditions associated with diagnosis or treatment (i e  mental illness)  Medical Nutrition Therapy Intervention: Carbohydrate counting, Meal planning, Individualized meal plan, Strategies to monitor portion control, Exercise Guidelines, Behavior modification strategies and Weight/BMI Goals  Her comprehension was excellent   Her motivation was fair   Her compliance was fair   Goals:  1  Count carbs and bolus for those eaten even if undesirable food choices  2  Find ways to consistently exercise  3  Practice positive self talk; continue with cognitive behavioral therapy      Active Problems    1  Abdominal pain, RUQ (789 01) (R10 11)   2  Abnormal glucose (790 29) (R73 09)   3  Acute bronchitis (466 0) (J20 9)   4  Acute Otitis Externa Of The Right Ear (380 22)   5  Acute sinusitis (461 9) (J01 90)   6  Anxiety (300 00) (F41 9)   7  Asthmatic bronchitis with exacerbation (493 92) (J45 901)   8  Celiac disease (579 0) (K90 0)   9   Controlled diabetes mellitus type II without complication (610 07) (D83 7)   10  Cough (786 2) (R05)   11  Depression (311) (F32 9)   12  Dizziness (780 4) (R42)   13  Encounter for smoking cessation counseling (V65 42,305 1) (Z71 6,Z72 0)   14  Fatigue (780 79) (R53 83)   15  Hypoglycemia (251 2) (E16 2)   16  Insulin pump in place (V45 85) (Z96 41)   17  Noncompliance (V15 81) (Z91 19)   18  Other acute sinusitis (461 8) (J01 80)   19  Pap smear for cervical cancer screening (V76 2) (Z12 4)   20  Type I diabetes mellitus, uncontrolled (250 03) (E10 65)   21  Upper respiratory tract infection, unspecified type (465 9) (J06 9)   22  Vertigo (780 4) (R42)    Past Medical History    1  History of Depression (311) (F32 9)   2  Encounter for smoking cessation counseling (V65 42,305 1) (Z71 6,Z72 0)   3  History of hypertension (V12 59) (Z86 79)    Surgical History    1  Denied: History Of Prior Surgery    Family History  Mother    1  No pertinent family history  Maternal Grandfather    2  Family history of Malignant Pancreatic Neoplasm  Paternal Grandfather    3  Family history of Stroke Syndrome (V17 1)  Family History    4  Family history of Family Health Status Of Father - Alive   5  Family history of Family Health Status Of Mother - Alive    Social History    · Being A Social Drinker   · Caffeine Use   · Current every day smoker (305 1) (F17 200)   · Denied: History of Drug Use    Current Meds   1  Accu-Chek FastClix Lancets Miscellaneous; use 8 x daily; Therapy: 17WIP2169 to (Evaluate:10Rlu6334)  Requested for: 89Uhd7990; Last   Rx:13Yyp5672 Ordered   2  Accu-Chek SmartView In Vitro Strip; USE 6 TIMES DAILY; Therapy: 86Hew5951 to (Evaluate:25Jun2017)  Requested for: 58BDE2148; Last   Rx:07Aym8160; Status: ACTIVE - Transmit to Pharmacy - Awaiting Verification Ordered   3  ALPRAZolam 0 25 MG Oral Tablet; take 1/2 to 1 tablet tid prn; Therapy: 35Nbh1899 to (Last Rx:31Mar2016)  Requested for: 54JNM9877 Ordered   4   BD Insulin Syringe Ultrafine 31G X 5/16" 1 ML Miscellaneous; USE ONE DAILY; Therapy: 65XPP0005 to (Evaluate:13Jun2015)  Requested for: 11QBJ5411; Last   Rx:79Xud0742 Ordered   5  Glucagon Emergency 1 MG Injection Kit; USE AS DIRECTED; Therapy: 37TTQ6319 to (Last Rx:96Tcd0446)  Requested for: 76Fqw6484 Ordered   6  Mirena IUD; Therapy: [phone]) to Recorded   7  Nova Max Plus [employer] In [employer]; use as directed; Therapy: 21Mar2015 to (Calimesa Lawrence)  Requested for: 10ZJP9890; Last   Rx:81Ybx4044; Status: [address] moira Laura Verification Ordered   8  NovoLOG 100 UNIT/ML Subcutaneous Solution; USE UP TO 50 UNITS VIA PUMP DAILY; Therapy: 22RAU9187 to (Josesito Sit)  Requested for: 89DQG0877; Last   DQ:66IDB3859 Ordered   9  Omeprazole 40 MG Oral Capsule Delayed Release; TAKE 1 CAPSULE DAILY EVERY   MORNING BEFORE BREAKFAST Recorded    Allergies    1   No Known Drug Allergies    Vitals  Signs   Recorded: 57KFL9859 01:32PM   Weight: 133 lb   BMI Calculated: 25 13  BSA Calculated: 1 59    Future Appointments    Date/Time Provider Specialty Site   12/02/2016 08:45 AM Nicola Grimm, 10 [facility] Endocrinology [address]     Signatures   Electronically signed by : Nelly Vogel RD; Nov 2 2016  2:02PM EST                       (Author)    Electronically signed by : LILA Palma ; Nov  3 2016  9:34AM EST

## 2018-01-13 VITALS
HEART RATE: 80 BPM | DIASTOLIC BLOOD PRESSURE: 76 MMHG | BODY MASS INDEX: 24.57 KG/M2 | SYSTOLIC BLOOD PRESSURE: 108 MMHG | HEIGHT: 61 IN | WEIGHT: 130.13 LBS

## 2018-01-13 NOTE — MISCELLANEOUS
Provider Comments  Provider Comments:   TZ SHOW      Signatures   Electronically signed by : LILA Mitchell ; May 27 2016  3:17PM EST                       (Review)

## 2018-01-13 NOTE — RESULT NOTES
0  20-1 00   ALK PHOSPHATAS 61 U/L     ALT (SGPT) 26 U/L  12-78   AST(SGOT) 12 U/L  5-45   ALBUMIN 4 1 g/dL  3 5-5 0   TOTAL PROTEIN 7 0 g/dL  6 4-8 2   eGFR Non-African American      >60 0 ml/min/1 73sq m   [facility] Disease Education Program recommendations are as follows:  GFR calculation is accurate only with a steady state creatinine  Chronic Kidney disease less than 60 ml/min/1 73 sq  meters  Kidney failure less than 15 ml/min/1 73 sq  meters  (1) URINALYSIS (will reflex a microscopy if leukocytes, occult blood, protein or nitrites are not within normal limits) 01Dxt3891 11:53AM Andre Apple Order Number: [id number]     Test Name Result Flag Reference   COLOR Yellow     CLARITY Clear     SPECIFIC GRAVITY UA <=1 005  1 003-1 030   PH UA 6 5  4 5-8 0   LEUKOCYTE ESTERASE UA  A Negative   Elevated glucose may cause decreased leukocyte values   See urine microscopic for [facility] result/   NITRITE UA Negative  Negative   PROTEIN UA Negative mg/dl  Negative   GLUCOSE UA >=1000 (1%) mg/dl A Negative   KETONES UA Negative mg/dl  Negative   UROBILINOGEN UA 0 2 E U /dl  0 2, 1 0 E U /dl   BILIRUBIN UA Negative  Negative   BLOOD UA Negative  Negative   BACTERIA None Seen /hpf  None Seen, Occasional   EPITHELIAL CELLS Occasional /hpf  None Seen, Occasional   RBC UA None Seen /hpf  None Seen   WBC UA None Seen /hpf  None Seen

## 2018-01-15 VITALS
BODY MASS INDEX: 24.92 KG/M2 | HEIGHT: 61 IN | HEART RATE: 78 BPM | WEIGHT: 132 LBS | DIASTOLIC BLOOD PRESSURE: 68 MMHG | SYSTOLIC BLOOD PRESSURE: 124 MMHG

## 2018-01-15 NOTE — MISCELLANEOUS
Message  spoke with patient by phone  she says recently she has been eating much healther and exercising and because of that, she has been having overnight/early morning lows  She tried to reduce her basal rate on her own, and thinks she reduced it too 1  much because after reducing overnight basal she was 199 in the morning  now she can't remember her original rates    12AM basal per last pump d/l was 0 85, she changed to 0 75 and was high, so change to 0 80  3AM basal per last pump d/l was 0 90, she thinks reduced to 0 75, so will change to 0 80  No problems with BG during day    ALso, she has been having problems with delivering boluses-- says the bolus doesn't deliver  b/c of this had highs, changed infusion set and has improved  Spoke with [facility]-- they said since this has happened multiple times and they thinks he should see us for possible trial of a different infusion set  she would like to f/u at [facility]  Contacted scheduling at \[facility]\"" to set up with isatu/abhijeet in next week or two  She does have back up plan and insulin pens that she knows to use if problems with pump  Advised her to d/l pump next week for review  Spoke with patient by phone again at [facility] says BG running low and not bolusing for meals  then she goes high, then goes low after correction dose  Reduced the basal rates by 0 05 units per hour  Changed the sensitivty from 50 to 55  change the carb ratios from 12 to 14  Bolus for carb intake  Call tomorrow for isatu if still having lows, otherwise d/l pump next week and schedule f/u appt  2        1 Amended By: Chito Lozano; Apr 07 2016 8:59 AM EST   2 Amended By: Chito Lozano; Apr 07 2016 4:06 PM EST    Signatures   Electronically signed by : CALIXTO Segura;  Apr 7 2016  4:07PM EST                       (Author)

## 2018-01-16 NOTE — MISCELLANEOUS
Message  Return to work or school:   Jonh Llanes is under my professional care  She was seen in my office on 09/28/2016        [address]         Signatures   Electronically signed by : LILA Matute ; Sep 28 2016  3:49PM EST                       (Review)

## 2018-01-18 NOTE — PROGRESS NOTES
Chief Complaint  Chief Complaint Free Text Note Form: Appointment to start on G5 DexCom sensor  History of Present Illness  HPI: Currently using a tslim pump  She has not been exercising, she is having issues with hypoglycemia  Current Meds   1  Accu-Chek FastClix Lancets Miscellaneous; use 8 x daily; Therapy: 85NKW4590 to (Evaluate:70Dgn9117)  Requested for: 38Thr1359; Last   Rx:02Pvk4668 Ordered   2  Accu-Chek SmartView In Vitro Strip; USE 10 TIMES DAILY; Therapy: 56Jow3934 to (Evaluate:05Jun2017)  Requested for: 37YSF5933; Last   Rx:21Una2414 Ordered   3  ALPRAZolam 0 25 MG Oral Tablet; take 1/2 to 1 tablet tid prn; Therapy: 06Elb5490 to (Last Rx:31Mar2016)  Requested for: 56TOF3622 Ordered   4  BD Insulin Syringe Ultrafine 31G X 5/16" 1 ML Miscellaneous; USE ONE DAILY; Therapy: 29PZD4748 to (Evaluate:13Jun2015)  Requested for: 64WQU3840; Last   Rx:79Uzl0536 Ordered   5  Glucagon Emergency 1 MG Injection Kit; USE AS DIRECTED; Therapy: 60CXJ1959 to (Last Rx:70Wid9310)  Requested for: 48Csh7288 Ordered   6  Levemir FlexTouch 100 UNIT/ML Subcutaneous Solution Pen-injector; Inject SQ 10 units   at 8am and 10 units at 8pm in case of pump failure; Therapy: 17TOH0196 to (Evaluate:06Jan2017)  Requested for: 66OFF5041; Last   Rx:02Zel1967 Ordered   7  Mirena IUD; Therapy: ([phone]) to Recorded   8  Nova Max Plus [employer] In [employer]; use as directed; Therapy: 21Mar2015 to (Ghazal Acuna)  Requested for: 17IPC4502; Last   Rx:41Dwp4801; Status: 15[address] moira Alvarado Ordered   9  NovoLOG 100 UNIT/ML Subcutaneous Solution; USE UP TO 50 UNITS VIA PUMP DAILY; Therapy: 31KPF5354 to ([phone])  Requested for: 37EEL0389; Last   Rx:06Jun2016 Ordered   10  NovoLOG FlexPen 100 UNIT/ML Subcutaneous Solution Pen-injector; Inject SQ with    meals 1 unit per 18 carbs   up to 50 units daily as    directed by provider in case of pump failure; Therapy: 44EDH2083 to (Evaluate:92Jmx1345)  Requested for: 47EOL1889; Last    Rx:67Wpj5721 Ordered   11  Omeprazole 40 MG Oral Capsule Delayed Release; TAKE 1 CAPSULE DAILY EVERY    MORNING BEFORE BREAKFAST Recorded    Allergies    1  No Known Drug Allergies    Results/Data  DSMT/MNT Time Record 12Dec2016 03:38PM [address]     Test Name Result Flag Reference   Date of Service 12/12/16     Start - Stop Time 10:45 am - 12 n     Total MInutes 75     Group Or Individual Instruction ind         Plan    1  DSMT/MNT Time Record; Status:Complete;   Done: 37FOX7132 03:38PM    Discussion/Summary  Discussion Summary:   Dexcom G5 started and paired with phone and   High and low setting of 80 - 250 used  No other alerts set at this time  She is scheduled for a follow up in 1 week and will download, discuss alert options and insertion of sensor  Future Appointments    Date/Time Provider Specialty Site   03/10/2017 08:50 AM LILA Ragsdale  Endocrinology [facility] ENDOCRINOLOGY [facility]   12/15/2016 07:45 PM LILA Anguiano   [address] [place] [address]   12/19/2016 09:00 AM BEATRICE Borrego Diabetes Educator [address] ENDOCRINOLOGY     Signatures   Electronically signed by : BEATRICE Gresham; Dec 13 2016  1:36PM EST                       (Author)    Electronically signed by : LILA Pinzon ; Dec 14 2016  8:37AM EST

## 2018-01-18 NOTE — MISCELLANEOUS
Message  I saw the patient in the office with the pump download  She is having nausea and vomiting decreased appetite for last 3 days along with uncontrolled blood sugars in 300-500  She is hesitant to increase the pump settings significantly  She also had ketones in the urine  we are asking her to hydrate  We increased the basal rates gently by 20% and decreased the bolus rates to avoid hypoglycemia  She will do stat labs  She is made aware that if for any further worsening she should go to the ER  Plan  Controlled diabetes mellitus type II without complication, Hypoglycemia, Insulin pump in  place, Noncompliance, Type I diabetes mellitus, uncontrolled    · (1) BETA HYDROXYBUTERATE; Status:Active; Requested for:86Qno0422;    · (1) CBC/PLT/DIFF; Status:Active; Requested for:19Cnf3005;    · (1) COMPREHENSIVE METABOLIC PANEL; Status:Active; Requested for:78Msc7109;    · (1) URINALYSIS (will reflex a microscopy if leukocytes, occult blood, protein or nitrites  are not within normal limits); Status:Active;  Requested Diogo Beltran;     Signatures   Electronically signed by : LILA Sheets ; Sep 28 2016 11:23AM EST                       (Author)

## 2018-01-19 DIAGNOSIS — E10.65 TYPE 1 DIABETES MELLITUS WITH HYPERGLYCEMIA (HCC): ICD-10-CM

## 2018-01-19 DIAGNOSIS — E55.9 VITAMIN D DEFICIENCY: ICD-10-CM

## 2018-01-19 DIAGNOSIS — E16.2 HYPOGLYCEMIA: ICD-10-CM

## 2018-01-19 DIAGNOSIS — Z96.41 PRESENCE OF INSULIN PUMP: ICD-10-CM

## 2018-01-23 NOTE — PROGRESS NOTES
Assessment    1  Type 1 diabetes mellitus with hyperglycemia, with long-term current use of insulin   (250 01,790 29) (E10 65)   2  Type I diabetes mellitus, uncontrolled (250 03) (E10 65)   3  Vitamin D deficiency disease (268 9) (E55 9)   4  Fatigue (780 79) (R53 83)   5  Hypoglycemia (251 2) (E16 2)   6  Insulin pump in place (V45 85) (Z96 41)   7  Noncompliance (V15 81) (Z91 19)   8  Depression (311) (F32 9)   9  Celiac disease (579 0) (K90 0)      #1 Poorly controlled type 1 diabetes mellitus: A1c 8 2%, Bg still    -On tandem T slim pump, does not follow the proper directions, poor compliance,  -pls wear CGM all the times which will prevent hypoglycemia at night  -I suggested to increase ISF to 40 and decreased target to 110, she says she will do herself  -She does not want to make any changes to her basals or boluses, I suggested decrease ICR to 15 at 8 PM  -she can consider using new Medtronic pump with automatic basal and collections  - fu eye and foot MD, had seen Maxim Guzman CDE  -fu lipids in future  -explained short and long term complications of DM    2  Celiac disease:  -pls fu a gluten free diet    3  Vit D def:  -double up on the dose and monitor levels     Plan  Celiac disease, Depression, Hypoglycemia, Insulin pump in place, Type 1 diabetes  mellitus with hyperglycemia, with long-term current use of insulin, Type I diabetes  mellitus, uncontrolled, Vitamin D deficiency disease    · (1) HEMOGLOBIN A1C; Status:Active; Requested LYF:[id number];    Perform:[facility]; MJC:76BLC9871; Ordered; For:Celiac disease, Depression, Hypoglycemia, Insulin pump in place, Type 1 diabetes mellitus with hyperglycemia, with long-term current use of insulin, Type I diabetes mellitus, uncontrolled, Vitamin D deficiency disease; Ordered By:Ortega Collier;   · (1) LIPID PANEL, FASTING; Status:Active; Requested YOP:[id number];    Perform:[facility]; PBW:[id number]; Ordered;   For:Celiac disease, Depression, Hypoglycemia, Insulin pump in place, Type 1 diabetes mellitus with hyperglycemia, with long-term current use of insulin, Type I diabetes mellitus, uncontrolled, Vitamin D deficiency disease; Ordered By:Ortega Collier;   · (1) MICROALBUMIN CREATININE RATIO, RANDOM URINE; Status:Active; Requested  BLJ:[id number];    Perform:[facility]; FLW:22JID2318; Ordered; For:Celiac disease, Depression, Hypoglycemia, Insulin pump in place, Type 1 diabetes mellitus with hyperglycemia, with long-term current use of insulin, Type I diabetes mellitus, uncontrolled, Vitamin D deficiency disease; Ordered By:Ortega Collier;   · (1) PTH N-TERMINAL (INTACT); Status:Active; Requested LUDMILA:[id number];    Perform:[facility]; NIF:98PZW7343; Ordered; For:Celiac disease, Depression, Hypoglycemia, Insulin pump in place, Type 1 diabetes mellitus with hyperglycemia, with long-term current use of insulin, Type I diabetes mellitus, uncontrolled, Vitamin D deficiency disease; Ordered By:Ortega Collier;   · (1) RENAL FUNCTION PANEL; Status:Active; Requested WCM:93HMT2030;    Perform:[facility]; PMB:35KHN9825; Ordered; For:Celiac disease, Depression, Hypoglycemia, Insulin pump in place, Type 1 diabetes mellitus with hyperglycemia, with long-term current use of insulin, Type I diabetes mellitus, uncontrolled, Vitamin D deficiency disease; Ordered By:Ortega Collier;   · (1) T4, FREE; Status:Active; Requested POY:[id number];    Perform:[facility]; TIS:19TOO1399; Ordered; For:Celiac disease, Depression, Hypoglycemia, Insulin pump in place, Type 1 diabetes mellitus with hyperglycemia, with long-term current use of insulin, Type I diabetes mellitus, uncontrolled, Vitamin D deficiency disease; Ordered By:Ortega Collier;   · (1) TSH; Status:Active; Requested SOD:77XOD9675;    Perform:[facility]; PUR:10EHE8407; Ordered;   For:Celiac disease, Depression, Hypoglycemia, Insulin pump in place, Type 1 diabetes mellitus with hyperglycemia, with long-term current use of insulin, Type I diabetes mellitus, uncontrolled, Vitamin D deficiency disease; Ordered By:Ortega Collier;   · (1) VITAMIN D 25-HYDROXY; Status:Active; Requested OWX:[id number];    Perform:[facility]; FUT:59WES8194; Ordered; For:Celiac disease, Depression, Hypoglycemia, Insulin pump in place, Type 1 diabetes mellitus with hyperglycemia, with long-term current use of insulin, Type I diabetes mellitus, uncontrolled, Vitamin D deficiency disease; Ordered By:Ortega Collier;   · [facility] (OPTHAMOLOGY ) Physician Referral  Consult Only: the expectation  is that the referring provider will communicate back to the patient on treatment options  Evaluation and Treatment: the expectation is that the referred to provider will  communicate back to the patient on treatment options  Status: Hold For - Scheduling   Requested for: 04BLU4665   Ordered; For: Celiac disease, Depression, Hypoglycemia, Insulin pump in place, Type 1 diabetes mellitus with hyperglycemia, with long-term current use of insulin, Type I diabetes mellitus, uncontrolled, Vitamin D deficiency disease; Ordered By: Juan Miguel Albarran Performed:  Due: 44POE6653  Care Summary provided  : Yasmin Shah MD, Luz Garcia  (Ophthalmology) Physician Referral  Consult Only: the expectation is  that the referring provider will communicate back to the patient on treatment options  Evaluation and Treatment: the expectation is that the referred to provider will  communicate back to the patient on treatment options  Status: Hold For - Scheduling   Requested for: 13XYP4925   Ordered;  For: Celiac disease, Depression, Hypoglycemia, Insulin pump in place, Type 1 diabetes mellitus with hyperglycemia, with long-term current use of insulin, Type I diabetes mellitus, uncontrolled, Vitamin D deficiency disease; Ordered By: Juan Miguel Albarran Performed:  Due: 91KFZ2940  Care Summary provided  : Yes   · Follow-up visit in 3 months Evaluation and Treatment  Follow-up  Status: Complete   Done: 29DCH8634   Ordered; For: Celiac disease, Depression, Hypoglycemia, Insulin pump in place, Type 1 diabetes mellitus with hyperglycemia, with long-term current use of insulin, Type I diabetes mellitus, uncontrolled, Vitamin D deficiency disease; Ordered By: Donna Whitman Performed:  Due: 03POO4540; Last Updated By: Yousuf Humphries; 3/13/2017 11:34:54 AM   · Follow-Up With Advanced Practitioner Evaluation and Treatment  Follow-up  Status:  Complete  Done: 77FUZ7389   Ordered; For: Celiac disease, Depression, Hypoglycemia, Insulin pump in place, Type 1 diabetes mellitus with hyperglycemia, with long-term current use of insulin, Type I diabetes mellitus, uncontrolled, Vitamin D deficiency disease; Ordered By: Donna Whitman Performed:  Due: 98HJX2136; Last Updated By: Yousuf Humphries; 3/13/2017 11:34:54 AM  Controlled diabetes mellitus type II without complication    · Blood Glucose- POC; Status:Active - Perform Order; Requested for:13Mar2017;    Performed: In Office; Due:39Kua1347; Ordered; For:Controlled diabetes mellitus type II without complication; Ordered By:Ortega Collier; Fasting (Y/N) : No - N    Discussion/Summary  Discussion Summary:   1  Please check blood sugars 2-4 times a day and send us logs  2  please carry glucose tablets with you all the time  3  Please call us probably the blood sugar is less than 70 or more than 300 more than twice  4  Please followed regularly with diabetes educator, podiatrist and ophthalmologist  5  Please perform lab work as advised and make sure we have discussed it in the office visit or on the phone within 7-14 days  6  If you are on insulin therapy, please make sure you rotate insulin injection sites to avoid any hard areas  pls download pump in 2 weeks     Counseling Documentation With Imm: The patient was counseled regarding diagnostic results, instructions for management, risk factor reductions, impressions, risks and benefits of treatment options, importance of compliance with treatment  Medication SE Review and Pt Understands Tx: Possible side effects of new medications were reviewed with the patient/guardian today  Chief Complaint  Chief Complaint Free Text Note Form: follow up      History of Present Illness  HPI: Continues to have issues with hyperglycemia and hypoglycemia  She is not always taking her bolusing  She does not count carbs accurately  She reported 2 days in a row when she had positive ketones despite not eating and going to yoga  She is very afraid of hypoglycemia  She says that she has decreased carbohydrates despite which she was running high  Denies any infections recently  She has not given any pump downloads in between the visit  Diabetes: The patient is being seen for routine follow-up of Diabetes Mellitus 1  The HbA1c was   By report, there is poor compliance with treatment, fair tolerance of treatment and fair symptom control  Current pertinent lifestyle factors include disordered eating, but no obesity  The patient is currently asymptomatic   Vitamin D Deficiency: The patient is being seen for follow-up of vitamin D deficiency  Disease type: vitamin D deficiency  Recent laboratory results: 25-hydroxyvitamin D ng/mL  Current treatment includes vitamin D3 (cholecalciferol)  The patient is currently asymptomatic  Review of Systems  ROS Reviewed:   ROS reviewed  Endo Adult ROS Female Established v2 Update - [facility]:   Constitutional/General: no recent weight gain, no recent weight loss, poor energy/fatigue, no increased energy level, insomnia/sleep problems, no fever and no feeling weak  Breasts: no nipple discharge  Heart: no high blood pressure, no chest pain/tightness, no rapid/racing heart rate and no palpitations  Genitourinary - Urinary: no frequent urination, no excess urination and no urinating during the night     Eyes: blurred vision, but no double vision, no bulging eyes, no gritty/scratchy eyes and no excessive tearing  Mouth / Throat: hoarseness, but no difficulty swallowing  Neck: no lumps, no swollen glands, no neck pain, no neck stiffness and no enlarged thyroid  Respiratory: no wheezing, no asthma and no persistent cough  Musculoskeletal: muscle aches/pain, joint aches/pain and no muscle weakness  Skin & Hair: no dry skin, no acne, the hair texture was not oily, no hair loss and no excessive hair growth  Gastrointestinal: no constipation, no diarrhea, no waking at night to drink and stomach ache  Neurological: no blackouts, no weakness and no tremors  Reproductive: frequency of period is unknown, duration of period is unknown, date of last menstruation 2/27/2017, periods are not regular, discomfort with periods, no excessive bleeding with periods and mood swings  Endocrine: no feeling hot frequently, no feeling cold frequently, no shifts between feeling hot and cold, cold hands or feet, no excessive sweating, no thyroid problems, blood sugar problems, no excessive thirst, no excessive hunger, no change in shoe size, nausea or vomiting and no shaky hands  Active Problems    1  Abdominal pain, RUQ (789 01) (R10 11)   2  Abnormal glucose (790 29) (R73 09)   3  Acute bronchitis (466 0) (J20 9)   4  Acute Otitis Externa Of The Right Ear (380 22)   5  Acute sinusitis (461 9) (J01 90)   6  Anxiety (300 00) (F41 9)   7  Asthmatic bronchitis with exacerbation (493 92) (J45 901)   8  Celiac disease (579 0) (K90 0)   9  Controlled diabetes mellitus type II without complication (069 70) (I57 4)   10  Cough (786 2) (R05)   11  Depression (311) (F32 9)   12  Dizziness (780 4) (R42)   13  Encounter for smoking cessation counseling (V65 42,305 1) (Z71 6,Z72 0)   14  Exposure to influenza (V01 79) (Z20 828)   15  Fatigue (780 79) (R53 83)   16  Hypoglycemia (251 2) (E16 2)   17  Insulin pump in place (V45 85) (Z96 41)   18   Noncompliance (V15 81) (Z91 19)   19  Other acute sinusitis (461 8) (J01 80)   20  Pap smear for cervical cancer screening (V76 2) (Z12 4)   21  Type 1 diabetes mellitus with hyperglycemia, with long-term current use of insulin    (250 01,790 29) (E10 65)   22  Type I diabetes mellitus, uncontrolled (250 03) (E10 65)   23  Upper respiratory tract infection, unspecified type (465 9) (J06 9)   24  Vertigo (780 4) (R42)   25  Vitamin D deficiency disease (268 9) (E55 9)    Past Medical History    1  History of Depression (311) (F32 9)   2  Encounter for smoking cessation counseling (V65 42,305 1) (Z71 6,Z72 0)   3  History of hypertension (V12 59) (Z86 79)  Active Problems And Past Medical History Reviewed: The active problems and past medical history were reviewed and updated today  Surgical History    1  Denied: History Of Prior Surgery  Surgical History Reviewed: The surgical history was reviewed and updated today  Family History  Mother    1  No pertinent family history  Maternal Grandfather    2  Family history of Malignant Pancreatic Neoplasm  Paternal Grandfather    3  Family history of Stroke Syndrome (V17 1)  Family History    4  Family history of Family Health Status Of Father - Alive   5  Family history of Family Health Status Of Mother - Alive  Family History Reviewed: The family history was reviewed and updated today  Social History    · Being A Social Drinker   · Caffeine Use   · Current every day smoker (305 1) (F17 200)   · Denied: History of Drug Use  Social History Reviewed: The social history was reviewed and updated today  Current Meds   1  Accu-Chek FastClix Lancets Miscellaneous; use 8 x daily; Therapy: 67ITL0625 to (Evaluate:67Wzs7039)  Requested for: 26Hca8837; Last   Rx:77Cns0907 Ordered   2  Accu-Chek SmartView In Vitro Strip; USE 10 TIMES DAILY; Therapy: 14Hlz3576 to (Evaluate:11Fts1925)  Requested for: 13LQZ2221; Last   Rx:61Hty1097 Ordered   3   ALPRAZolam 0 25 MG Oral Tablet; take 1/2 to 1 tablet tid prn; Therapy: 57Iev8517 to (Last Rx:31Mar2016)  Requested for: 71OCM0525 Ordered   4  BD Insulin Syringe Ultrafine 31G X 5/16" 1 ML Miscellaneous; USE ONE DAILY; Therapy: 29ZFS2934 to (Evaluate:13Jun2015)  Requested for: 45FEA0135; Last   Rx:73Pwo2601 Ordered   5  Glucagon Emergency 1 MG Injection Kit; USE AS DIRECTED; Therapy: 25OWZ0169 to (Last Rx:44Vjx9899)  Requested for: 05Qjm2575 Ordered   6  Levemir FlexTouch 100 UNIT/ML Subcutaneous Solution Pen-injector; Inject SQ 10 units   at 8am and 10 units at 8pm in case of pump failure; Therapy: 47FQE0033 to (Evaluate:06Jan2017)  Requested for: 75OCL2168; Last   Rx:55Jsv8006 Ordered   7  Mirena IUD; Therapy: (0523-6342406) to Recorded   8  Nova Max Plus [employer]; use as directed; Therapy: 21Mar2015 to (Celi Fitzgerald)  Requested for: 82IYF3185; Last   Rx:53Lkx8597; Status: [address] moira Alvarado Ordered   9  NovoLOG 100 UNIT/ML Subcutaneous Solution; USE UP TO 50 UNITS VIA PUMP DAILY; Therapy: 35POM7755 to (Evaluate:03Sep2017)  Requested for: 82VQB7391; Last   Rx:07Mar2017 Ordered   10  NovoLOG FlexPen 100 UNIT/ML Subcutaneous Solution Pen-injector; Inject SQ with    meals 1 unit per 18 carbs  up to 50 units daily as    directed by provider in case of pump failure; Therapy: 00KGY2148 to (Evaluate:06Jan2017)  Requested for: 36IVC5312; Last    Rx:55Pgr6673; Status: ACTIVE - Renewal Denied Ordered   11  Vitamin D 2000 UNIT Oral Capsule; 2 CAPSULES DAILY; Therapy: 73YCO6539 to Recorded  Medication List Reviewed: The medication list was reviewed and updated today  Allergies    1   No Known Drug Allergies    Vitals  Vital Signs    Recorded: 44LGI3629 11:08AM   Heart Rate 80   Systolic 180   Diastolic 76   Height 5 ft 1 in   Weight 130 lb 2 00 oz   BMI Calculated 24 59   BSA Calculated 1 57     Physical Exam    Constitutional   General appearance: No acute distress, well appearing and well nourished  Eyes   Conjunctiva and lids: No swelling, erythema, or discharge  Pupils: Equal, round and reactive to light  The sclera are anicteric  Extraocular movements are intact  Ears, Nose, Mouth, and Throat   External inspection of ears, nose and lips: Normal     Oropharynx: Normal with no erythema, edema, exudate or lesions  Exam of Head: The head is atraumatic and normocephalic  Pulmonary   Auscultation of lungs: Clear to auscultation bilaterally with normal chest expansion  Cardiovascular   Auscultation of heart: Normal rate and rhythm with no murmurs, gallops or rubs  Examination of extremities for edema and/or varicosities: Normal     Examination of pulses: Dorsalis pedal pulses are +2 and equal bilaterally  Abdomen   Abdomen: Abdomen is soft, non-tender with normal bowel sounds  Lymphatic   Palpation of lymph nodes: No supraclavicular or suboccipital lymphadenopathy  Musculoskeletal   Inspection/palpation of joints, bones, and muscles: Muscle bulk and tone is normal     Skin   Skin and subcutaneous tissue: Normal skin temperature and color  Neurologic   Reflexes: 2+ and symmetric  Motor Strength: Strength is 5/5 bilaterally  Psychiatric   Orientation to person, place and time: Normal     Mood and affect: Affect and attention span are normal        Results/Data  Diagnostic Studies Reviewed: I personally reviewed the films/images/results in the office today  My interpretation follows  (1) HEMOGLOBIN A1C 99KWH4847 08:57AM Evie Plush   TW Order Number: [id number]     Test Name Result Flag Reference   HEMOGLOBIN A1C 8 2 % H 4 2-6 3   EST  AVG   GLUCOSE 189 mg/dl       (1) PTH N-TERMINAL (INTACT) 50ORO6555 08:57AM Evie Radnor   TW Order Number: [id number]     Test Name Result Flag Reference   PARATHYROID HORMONE INTACT 59 7 pg/mL  14 0-72 0     (1) RENAL FUNCTION PANEL 32NHX7441 08:57AM Rafael Brandt Order Number: [id number]     Test Name Result Flag Reference   ANION GAP (CALC) 7 mmol/L  4-13   ALBUMIN 4 1 g/dL  3 5-5 0   BLOOD UREA NITROGEN 9 mg/dL  5-25   CALCIUM 8 6 mg/dL  8 3-10 1   CHLORIDE 103 mmol/L  100-108   CARBON DIOXIDE 30 mmol/L  21-32   CREATININE 0 68 mg/dL  0 60-1 30   Standardized to IDMS reference method   GLUCOSE,RANDM 154 mg/dL H    POTASSIUM 4 1 mmol/L  3 5-5 3   eGFR Non-African American      >60 0 ml/min/1 73sq m   [facility] Disease Education Program recommendations are as follows:  GFR calculation is accurate only with a steady state creatinine  Chronic Kidney disease less than 60 ml/min/1 73 sq  meters  Kidney failure less than 15 ml/min/1 73 sq  meters  SODIUM 140 mmol/L  136-145   PHOSPHORUS 3 2 mg/dL  2 7-4 5     (1) VITAMIN D 25-HYDROXY 96AZN4736 08:57AM Brenda Dillard    Order Number: [id number]     Test Name Result Flag Reference   VIT D 25-HYDROX 27 4 ng/mL L 30 0-100 0   This assay is a certified procedure of the CDC Vitamin D Standardization Certification Program (VDSCP)     Deficiency <20ng/ml   Insufficiency 20-30ng/ml   Sufficient  ng/ml     *Patients undergoing fluorescein dye angiography may retain small amounts of fluorescein in the body for 48-72 hours post procedure  Samples containing fluorescein can produce falsely elevated Vitamin D values  If the patient had this procedure, a specimen should be resubmitted post fluorescein clearance       DSMT/MNT Time Record 31Luv0062 01:51PM Lawerence Indira     Test Name Result Flag Reference   Date of Service 12/19/16     Start - Stop Time 9:00 am - 9:45 am     Total MInutes 45     Group Or Individual Instruction ind       DSMT/MNT Time Record 82Xyg3092 03:38PM Lawerence Indira     Test Name Result Flag Reference   Date of Service 12/12/16     Start - Stop Time 10:45 am - 12 n     Total MInutes 75     Group Or Individual Instruction ind       (1) VITAMIN D 25-HYDROXY 06JKQ0961 08:45AM Gill Rincon Order Number: [id number]     Test Name Result Flag Reference   VIT D 25-HYDROX 25 3 ng/mL L 30 0-100 0   This assay is a certified procedure of the CDC Vitamin D Standardization Certification Program (VDSCP)     Deficiency <20ng/ml   Insufficiency 20-30ng/ml   Sufficient  ng/ml     *Patients undergoing fluorescein dye angiography may retain small amounts of fluorescein in the body for 48-72 hours post procedure  Samples containing fluorescein can produce falsely elevated Vitamin D values  If the patient had this procedure, a specimen should be resubmitted post fluorescein clearance  (1) HEMOGLOBIN A1C [id number] 08:45AM Kamila COUGHLIN Order Number: [id number]     Test Name Result Flag Reference   HEMOGLOBIN A1C 8 3 % H 4 2-6 3   EST  AVG   GLUCOSE 192 mg/dl       Signatures   Electronically signed by : LILA Wadsworth ; Mar 13 2017  5:02PM EST                       (Author)

## 2018-01-24 NOTE — PROGRESS NOTES
Assessment    1  Celiac disease (579 0) (K90 0)   2  Fatigue (780 79) (R53 83)   3  Hypoglycemia (251 2) (E16 2)   4  Type I diabetes mellitus, uncontrolled (250 03) (E10 65)   5  Insulin pump in place (V45 85) (Z96 41)   6  Noncompliance (V15 81) (Z91 19)    #1 poorly controlled type 1 diabetes mellitus: A1c 9 4%  -She has these T slim tandem pump  -Blood Sugar review shows numbers between 200-400 for most part  No hypoglycemia   -basal rates are 0 8 units an hour> will increased to 1 2 units an hour all across  -I suspect her quick blood sugar drops are from higher bolus rather than a basal setting  -will not change her bolus settings for now : ICR- 1:12 and 1: 50 ISF  -She'll bring the download again in 2 weeks so we can make adjustment to her pump regimen  -Hypoglycemia protocol reviewed  -She does not want to see a diabetes educator at this time  -She will be going back to improvement in the lifestyle interventions including healthy diet and exercise  -She is told to use a temp basal of 50% about 2 hours prior to the exercise and one hour during exercise to prevent hypoglycemia  -She has a backup glucagon kit and Levemir if needed  -Normal thyroid, renal, liver function and normal MALB    2  Celiac:  - on gluten free diet     Plan  Acute sinusitis    · Levofloxacin 500 MG Oral Tablet   Rx By: Jolanta Johns; Dispense: 7 Days ; #:7 Tablet; Refill: 0; For: Acute sinusitis; MAGGY = N; Verified Transmission to [facility]/PHARMACY #[id number] Last Updated By: Lesli Quintanilla; 8/12/2016 9:31:43 AM  Controlled diabetes mellitus type II without complication, Fatigue, Type I diabetes  mellitus, uncontrolled    · (1) HEMOGLOBIN A1C; Status:Active; Requested for:09Nov2016;    Perform:[facility]; TVQ:69ONW4679;LWIDBQT; For:Controlled diabetes mellitus type II without complication, Fatigue, Type I diabetes mellitus, uncontrolled; Ordered By:Ortega Collier;   · (1) RENAL FUNCTION PANEL; Status:Active;  Requested for:09Nov2016;    Perform:[facility]; FZB:72MPN8236;IQJNHUO; For:Controlled diabetes mellitus type II without complication, Fatigue, Type I diabetes mellitus, uncontrolled; Ordered By:Ortega Collier;   · Begin a limited exercise program ; Status:Complete;   Done: 45WZK3101   Ordered; For:Controlled diabetes mellitus type II without complication, Fatigue, Type I diabetes mellitus, uncontrolled; Ordered By:Ortega Collier;   · Benefits of Exercise/Physical Activity; Status:Complete;   Done: 52ZDG7041   Ordered; For:Controlled diabetes mellitus type II without complication, Fatigue, Type I diabetes mellitus, uncontrolled; Ordered By:Ortega Collier;   · Eat a low fat and low cholesterol diet ; Status:Complete;   Done: 68PSK9729   Ordered; For:Controlled diabetes mellitus type II without complication, Fatigue, Type I diabetes mellitus, uncontrolled; Ordered By:Ortega Collier;   · 2 - Petar Flores  (Podiatry) Physician Referral  Consult  Status: Active   Requested for: 55AMK0956   Ordered; For: Controlled diabetes mellitus type II without complication, Fatigue, Type I diabetes mellitus, uncontrolled; Ordered By: Alfred Rodriguez Performed:  Due: 15ICN4851; Last Updated By: Kit Staton; 8/12/2016 10:12:11 AM  Care Summary provided  : Yes   · Follow-up visit in 6 weeks Evaluation and Treatment  Follow-up  Status: Complete  Done:  63TDY2162   Ordered; For: Controlled diabetes mellitus type II without complication, Fatigue, Type I diabetes mellitus, uncontrolled; Ordered By: Alfred Rodriguez Performed:  Due: 84UQV1076; Last Updated By: Kit Staton; 8/12/2016 10:12:11 AM  Hypoglycemia    · Glucagon Emergency 1 MG Injection Kit; USE AS DIRECTED   Rx By: Alfred Rodriguez; Dispense: 0 Days ; #:1 Kit;  Refill: 0; For: Hypoglycemia; MAGGY = N; Verified Transmission to [facility]PH[facility]ACY #[id number]; Last Updated By: System, [employer]; 8/12/2016 10:21:06 AM  Type I diabetes mellitus, uncontrolled    · Fingerstick - POC; Status:Complete - Retrospective By Protocol Authorization;   Done:  98EOB7694 09:40AM   Performed: In Office; Due:35Kcv2260; Last Updated Juan Alberto Singh; 8/12/2016 9:38:56 AM;Ordered; For:Type I diabetes mellitus, uncontrolled; Ordered By:Lisset Collier; Discussion/Summary  Discussion Summary:   Decrease the basal rate to 50% , 2 hours prior to the exercise and continue that for another 1 hour during or after the exercise to prevent hypoglycemia  -Please bring pump download every 2 weeks for us to help to adjust regimen  -Please come back in 6 weeks  Counseling Documentation With Imm: The patient was counseled regarding diagnostic results, instructions for management, risk factor reductions, impressions, risks and benefits of treatment options, importance of compliance with treatment  Medication SE Review and Pt Understands Tx: Possible side effects of new medications were reviewed with the patient/guardian today  The treatment plan was reviewed with the patient/guardian  The patient/guardian understands and agrees with the treatment plan      Chief Complaint  Chief Complaint Free Text Note Form: Follow up   Chief Complaint Chronic Condition [facility]: Patient is here today for follow up of chronic conditions described in HPI  History of Present Illness  HPI: 28-year-old female with a past medical history of type 1 diabetes mellitus diagnosed about 4 years ago with possible retinopathy and neuropathy, a1c of 9% comes for follow-up visit  She has not been exercising for last month or so and has been eating unscrupulously leading to weight gain and poor sugar control  In the past she had hypoglycemia during exercises but none in the last 1 month or so  She reports having a difficult and busy schedule  She had diabetes education and pump training in the past at [place] [address] CV office   She follows with an ophthalmologist but has not required to see podiatrist  She has celiac disease and is on gluten-free diet    Diabetes: The patient is being seen for routine follow-up of Diabetes Mellitus 1  The HbA1c was   See Medication List for current medication(s)  By report, there is poor compliance with treatment, fair tolerance of treatment and fair symptom control  Current pertinent lifestyle factors include disordered eating and inactivity, but no obesity  The patient is currently asymptomatic   Disease Course and Complications:  there have been no previous episodes of diabetic ketoacidosis  there have been previous hospitalizations  The last dilated fundus exam showed mild nonproliferative retinopathy  Cardiovascular: no coronary artery disease and no prior myocardial infarction  Renal: no nephropathy and no proteinuria  Neurologic: peripheral neuropathy  Hypoglycemia episodes are moderate in severity and managed by eating candy and drinking juice  Review of Systems  ROS Reviewed:   ROS reviewed  Endo Adult ROS Female Established v2 Update - [facility]:   Constitutional/General: recent weight gain, no recent weight loss, poor energy/fatigue, no increased energy level, insomnia/sleep problems, no fever and no feeling weak  Breasts: no nipple discharge  Heart: no high blood pressure, no chest pain/tightness, no rapid/racing heart rate and no palpitations  Genitourinary - Urinary: no frequent urination, no excess urination and no urinating during the night  Eyes: blurred vision, but no double vision, no bulging eyes, no gritty/scratchy eyes and no excessive tearing  Mouth / Throat: no hoarseness and no difficulty swallowing  Neck: no lumps, no swollen glands, no neck pain, no neck stiffness and no enlarged thyroid  Respiratory: no wheezing, no asthma and no persistent cough  Musculoskeletal: no muscle aches/pain, no joint aches/pain and no muscle weakness  Skin & Hair: no dry skin, acne, the hair texture was not oily, hair loss and no excessive hair growth     Gastrointestinal: no constipation, no diarrhea, no waking at night to drink and stomach ache  Neurological: no blackouts, no weakness and no tremors  Reproductive: frequency of period is unknown, duration of period is unknown, date of last menstruation 8/6/16, periods are not regular, discomfort with periods, no excessive bleeding with periods and mood swings  Endocrine: no feeling hot frequently, no feeling cold frequently, no shifts between feeling hot and cold, no cold hands or feet, no excessive sweating, no thyroid problems, blood sugar problems, no excessive thirst, excessive hunger, no change in shoe size, no nausea or vomiting and no shaky hands  Active Problems    1  Abdominal pain, RUQ (789 01) (R10 11)   2  Abnormal glucose (790 29) (R73 09)   3  Acute bronchitis (466 0) (J20 9)   4  Acute Otitis Externa Of The Right Ear (380 22)   5  Acute sinusitis (461 9) (J01 90)   6  Anxiety (300 00) (F41 9)   7  Asthmatic bronchitis with exacerbation (493 92) (J45 901)   8  Celiac disease (579 0) (K90 0)   9  Controlled diabetes mellitus type II without complication (694 82) (O70 5)   10  Cough (786 2) (R05)   11  Depression (311) (F32 9)   12  Dizziness (780 4) (R42)   13  Encounter for smoking cessation counseling (V65 42,305 1) (Z71 6,Z72 0)   14  Fatigue (780 79) (R53 83)   15  Hypoglycemia (251 2) (E16 2)   16  Other acute sinusitis (461 8) (J01 80)   17  Pap smear for cervical cancer screening (V76 2) (Z12 4)   18  Type I diabetes mellitus, uncontrolled (250 03) (E10 65)   19  Upper respiratory tract infection, unspecified type (465 9) (J06 9)   20  Vertigo (780 4) (R42)    Past Medical History    1  History of Depression (311) (F32 9)   2  Encounter for smoking cessation counseling (V65 42,305 1) (Z71 6,Z72 0)   3  History of hypertension (V12 59) (Z86 79)  Active Problems And Past Medical History Reviewed: The active problems and past medical history were reviewed and updated today  Surgical History    1   Denied: History Of Prior Surgery  Surgical History Reviewed: The surgical history was reviewed and updated today  Family History  Mother    1  No pertinent family history  Maternal Grandfather    2  Family history of Malignant Pancreatic Neoplasm  Paternal Grandfather    3  Family history of Stroke Syndrome (V17 1)  Family History    4  Family history of Family Health Status Of Father - Alive   5  Family history of Family Health Status Of Mother - Alive  Family History Reviewed: The family history was reviewed and updated today  Social History    · Being A Social Drinker   · Caffeine Use   · Current every day smoker (305 1) (F17 200)   · Denied: History of Drug Use  Social History Reviewed: The social history was reviewed and updated today  Current Meds   1  Accu-Chek FastClix Lancets Miscellaneous; use 8 x daily; Therapy: 98ZNZ4060 to (Evaluate:96Ncu9883)  Requested for: 99Ozp3625; Last   Rx:77Qlr3299 Ordered   2  Accu-Chek SmartView In Vitro Strip; USE 6 TIMES DAILY; Therapy: 99Ute1279 to (Keshav Balderas)  Requested for: 70LVA3519; Last   Rx:78Eep3979 Ordered   3  ALPRAZolam 0 25 MG Oral Tablet; take 1/2 to 1 tablet tid prn; Therapy: 76Mzw8923 to (Last Rx:31Mar2016)  Requested for: 01ZJP7538 Ordered   4  BD Insulin Syringe Ultrafine 31G X 5/16" 1 ML Miscellaneous; USE ONE DAILY; Therapy: 60KBX9430 to (Evaluate:13Jun2015)  Requested for: 82CDW7200; Last   Rx:13Qot5417 Ordered   5  BuPROPion HCl ER (SR) 150 MG Oral Tablet Extended Release 12 Hour; take  one   tablet tid a day; Therapy: 35Qqq7851 to (Last Rx:14Apr2016)  Requested for: 20Lcq2200 Ordered   6  Glucagon Emergency 1 MG Injection Kit; use as directed; Therapy: 89UKB9200 to (Evaluate:50Zim7150)  Requested for: 94KFT7058; Last   Rx:13Mar2015 Ordered   7  Levemir 100 UNIT/ML Subcutaneous Solution; INJECT SUBCUTANEOUSLY EVERY 12   HOURS AS DIRECTED; Therapy: 19VGO7179 to Recorded   8   Levofloxacin 500 MG Oral Tablet; TAKE 1 TABLET DAILY; Therapy: 22YAW9504 to (Evaluate:2016)  Requested for: 53ATE2488; Last   Rx:51Spx6972 Ordered   9  Mirena IUD; Therapy: () to Recorded   10  Nova Max Plus Ketone Test In Vitro Strip; USE AS DIRECTED; Therapy: 38OJY6970 to (Last Rx:36Htb5362)  Requested for: 62Gzr4837 Ordered   11  NovoLOG 100 UNIT/ML Subcutaneous Solution; USE UP TO 50 UNITS VIA PUMP DAILY; Therapy: 74BLE1637 to (Vandana Irizarry)  Requested for: 11LCQ7907; Last    H82ZES2667 Ordered   12  Omeprazole 40 MG Oral Capsule Delayed Release; TAKE 1 CAPSULE DAILY EVERY    MORNING BEFORE BREAKFAST Recorded  Medication List Reviewed: The medication list was reviewed and updated today  Allergies    1  No Known Drug Allergies    Vitals  Vital Signs    Recorded: 68Yqf0574 09:36AM Recorded: 48MPO8033 19:49TT   Systolic 98    Diastolic 70    Heart Rate 62    Height  5 ft 1 in   Weight  132 lb 6 00 oz   BMI Calculated  25 01   BSA Calculated  1 58     Physical Exam    Constitutional   General appearance: No acute distress, well appearing and well nourished  Eyes   Conjunctiva and lids: No swelling, erythema, or discharge  Ears, Nose, Mouth, and Throat   Neck: The neck is supple  The thyroid is normal in size with no palpable nodules  Pulmonary   Auscultation of lungs: Clear to auscultation bilaterally with normal chest expansion  Cardiovascular   Auscultation of heart: Normal rate and rhythm with no murmurs, gallops or rubs  Examination of extremities for edema and/or varicosities: Normal     Examination of pulses: Dorsalis pedal pulses are +2 and equal bilaterally  Abdomen   Abdomen: Abdomen is soft, non-tender with normal bowel sounds  Lymphatic   Palpation of lymph nodes: No supraclavicular or suboccipital lymphadenopathy  Musculoskeletal   Inspection/palpation of joints, bones, and muscles: Muscle bulk and tone is normal     Skin   Skin and subcutaneous tissue: Normal skin temperature and color  Neurologic   Reflexes: 2+ and symmetric  Motor Strength: Strength is 5/5 bilaterally  Psychiatric   Orientation to person, place and time: Normal     Mood and affect: Affect and attention span are normal        Results/Data  Fingerstick - POC 20YBW5779 09:40AM Fly Spearing     Test Name Result Flag Reference   Glucose Finger Stick 265       Diagnostic Studies Reviewed: I personally reviewed the films/images/results in the office today  My interpretation follows  (1) HEMOGLOBIN A1C 70Cfx7282 04:01PM [employer] Order Number: [id number]     Test Name Result Flag Reference   HEMOGLOBIN A1C 9 4 % H 4 2-6 3   EST  AVG  GLUCOSE 223 mg/dl       (1) COMPREHENSIVE METABOLIC PANEL 55BAO6926 60:84LO [employer] Order Number: [id number]     Test Name Result Flag Reference   GLUCOSE,RANDM 292 mg/dL H    If the patient is fasting, the ADA then defines impaired fasting glucose as > 100 mg/dL and diabetes as > or equal to 123 mg/dL  SODIUM 135 mmol/L L 136-145   POTASSIUM 4 2 mmol/L  3 5-5 3   CHLORIDE 101 mmol/L  100-108   CARBON DIOXIDE 26 mmol/L  21-32   ANION GAP (CALC) 8 mmol/L  4-13   BLOOD UREA NITROGEN 11 mg/dL  5-25   CREATININE 0 75 mg/dL  0 60-1 30   Standardized to IDMS reference method   CALCIUM 9 0 mg/dL  8 3-10 1   BILI, TOTAL 0 35 mg/dL  0 20-1 00   ALK PHOSPHATAS 56 U/L     ALT (SGPT) 22 U/L  12-78   AST(SGOT) 13 U/L  5-45   ALBUMIN 4 2 g/dL  3 5-5 0   TOTAL PROTEIN 7 2 g/dL  6 4-8 2   eGFR Non-African American      >60 0 ml/min/1 73sq [facility] [employer] Disease Education Program recommendations are as follows:  GFR calculation is accurate only with a steady state creatinine  Chronic Kidney disease less than 60 ml/min/1 73 sq  meters  Kidney failure less than 15 ml/min/1 73 sq  meters       (1) T4, FREE 36Ers4997 04:01PM [employer] Order Number: [id number]     Test Name Result Flag Reference   T4,FREE 0 84 ng/dL  0 76-1 46     (1) TSH 44LKW2020 04:01PM [employer] Order Number: [id number]     Test Name Result Flag Reference   TSH 2 010 uIU/mL  0 358-3 740   Patients undergoing fluorescein dye angiography may retain small amounts of fluorescein in the body for 48-72 hours post procedure  Samples containing fluorescein can produce falsely depressed TSH values  If the patient had this procedure,a specimen should be resubmitted post fluorescein clearance  The recommended reference ranges for TSH during pregnancy are as follows:  First trimester 0 1 to 2 5 uIU/mL  Second trimester  0 2 to 3 0 uIU/mL  Third trimester 0 3 to 3 0 uIU/m     Fingerstick - POC 15Apr2016 09:28AM [employer]     Test Name Result Flag Reference   Glucose Finger Stick 171       (1) HEMOGLOBIN A1C 74Kco5851 05:46PM [email or web address]     Test Name Result Flag Reference   HEMOGLOBIN A1C 8 4 % H 4 0-5 6   5 7-6 4% impaired fasting glucose  >=6 5% diagnosis of diabetes  Falsely low levels are seen in conditions linked to short RBC life span  ? hemolytic anemia, and splenomegaly  Falsely elevated levels are seen in situations where there is an  increased production of RBC ? receipt of erythropoietin or blood  transfusions  Adopted from ADA-Clinical Practice Recommendations   EST  AVG   GLUCOSE 194 mg/dL       (1) COMPREHENSIVE METABOLIC PANEL 10SPO3188 06:87CZ [email or web address]     Test Name Result Flag Reference   SODIUM 139 mmol/L  136-145   POTASSIUM 4 4 mmol/L  3 5-5 3   CHLORIDE 103 mmol/L  100-108   CARBON DIOXIDE 28 mmol/L  21 0-32 0   ANION GAP (CALC) 8 mmol/L  4-13   BILI, TOTAL 0 70 mg/dL  0 20-1 00   BLOOD UREA NITROGEN 9 mg/dL  5-25   CALCIUM 9 0 mg/dL  8 3-10 1   CREATININE 0 65 mg/dL  0 60-1 30   eGFR African American >60 ml/min/1 73sq m     eGFR Non-African American >60 ml/min/1 73sq m     TOTAL PROTEIN 6 7 g/dL  6 4-8 2   ALK PHOSPHATAS 55 U/L     ALT (SGPT) 23 U/L  12-78   AST(SGOT) 12 U/L  5-45   GLUCOSE,RANDM 127 mg/dL     ALBUMIN 4 3 g/dL  3 5-5 0     (1) THIN PREP PAP WITH IMAGING 00CGN2970 12: 00AM      Test Name Result Flag Reference   THIN PREP PAP W  IMAG COMMENT   Summary / No summary entered :      No summary entered  Documents attached :      sPap Tests - Iris Perches; Enc: 69GRX2302 - Image Encounter - Iris Perches - (Family      Medicine) (Additional Information Document)  Endocrine Flow Sheet 14MVU8374 09:15AM Miah Arevalo     Test Name Result Flag Reference   Foot Exam 52VCP1046     Eye Exam 33GFF4010       (1) HEMOGLOBIN A1C 60Ofv5824 05:08PM Miah Arevalo     Test Name Result Flag Reference   HEMOGLOBIN A1C 9 0 % H 4 0-5 6   5 7-6 4% impaired fasting glucose  >=6 5% diagnosis of diabetes  Falsely low levels are seen in conditions linked to short RBC life span  ? hemolytic anemia, and splenomegaly  Falsely elevated levels are seen in situations where there is an  increased production of RBC ? receipt of erythropoietin or blood  transfusions  Adopted from ADA-Clinical Practice Recommendations   EST  AVG  GLUCOSE 212 mg/dL       Future Appointments    Date/Time Provider Specialty Site   10/03/2016 03:10 PM LILA Moore   Endocrinology [facility] [address] [place] [address]     Signatures   Electronically signed by : LILA Guardado ; Aug 12 2016 10:38AM EST                       (Author)

## 2018-01-24 NOTE — PROGRESS NOTES
Assessment    1  Type I diabetes mellitus, uncontrolled (250 03) (E10 65)   2  Celiac disease (579 0) (K90 0)   3  Hypoglycemia (251 2) (E16 2)   4  Insulin pump in place (V45 85) (Z96 41)   5  Noncompliance (V15 81) (Z91 19)    #1 poorly controlled type 1 diabetes mellitus: A1c greater than 9%, Bg still    -On tandem T slim pump, does not follow the proper directions, poor compliance  -Made following changes based on her blood sugars in last 3 days  -Change basal rate at 6 AM to 1 3 and 3 PM to 1 3  - Increase a correction factor from 50 to 40 across the board  - Change the carb ratio from 18 to 16 at 6 AM  - Please see scanned pump download for any further changes  -Adv to download every 2 weeks to adjust settings  -Please  follow-up visit with nutritionist for carb counting and proper bolusing  - We will recommend a continuous glucose monitor for poor hypoglycemia awareness    2  Celiac disease:  -pls fu a gluten free diet     Plan  Celiac disease, Fatigue, Hypoglycemia, Insulin pump in place, Noncompliance, Type I  diabetes mellitus, uncontrolled    · (1) HEMOGLOBIN A1C; Status:Active; Requested for:21Nov2016;    Perform:[facility]; VYQ:[id number];BTXCXAG; For:Celiac disease, Fatigue, Hypoglycemia, Insulin pump in place, Noncompliance, Type I diabetes mellitus, uncontrolled; Ordered By:Ortega Collier;   · (1) LIPID PANEL, FASTING; Status:Active; Requested for:21Nov2016;    Perform:[facility]; AXZ:[id number];BZKICFQ; For:Celiac disease, Fatigue, Hypoglycemia, Insulin pump in place, Noncompliance, Type I diabetes mellitus, uncontrolled; Ordered By:Ortega Collier;   · (1) PTH N-TERMINAL (INTACT); Status:Active; Requested for:21Nov2016;    Perform:[facility]; FQX:86XJD4213;EKRGGTL;   For:Celiac disease, Fatigue, Hypoglycemia, Insulin pump in place, Noncompliance, Type I diabetes mellitus, uncontrolled; Ordered By:Ortega Collier;   · (1) RENAL FUNCTION PANEL; Status:Active; Requested for:21Nov2016;    Perform:[facility]; ISF:07KUG5034;RJLXEYU; For:Celiac disease, Fatigue, Hypoglycemia, Insulin pump in place, Noncompliance, Type I diabetes mellitus, uncontrolled; Ordered By:Ortega Collier;   · (1) T4, FREE; Status:Complete;   Done: 05ZQR5208   Perform:[facility]; NWB:44FSL0735;FAIGMDM; For:Celiac disease, Fatigue, Hypoglycemia, Insulin pump in place, Noncompliance, Type I diabetes mellitus, uncontrolled; Ordered By:Ortega Collier;   · (1) TSH; Status:Complete;   Done: 22SXE1374   Perform:[facility]; JANI:79IIJ1311;PYMAZEV; For:Celiac disease, Fatigue, Hypoglycemia, Insulin pump in place, Noncompliance, Type I diabetes mellitus, uncontrolled; Ordered By:Ortega Collier;   · Begin a limited exercise program ; Status:Complete;   Done: 53DHC5357   Ordered; For:Celiac disease, Fatigue, Hypoglycemia, Insulin pump in place, Noncompliance, Type I diabetes mellitus, uncontrolled; Ordered By:Ortega Collier;   · Eat a low fat and low cholesterol diet ; Status:Complete;   Done: 66RJY1939   Ordered; For:Celiac disease, Fatigue, Hypoglycemia, Insulin pump in place, Noncompliance, Type I diabetes mellitus, uncontrolled; Ordered By:Ortega Collier;   · We recommend that you bring your body mass index down to 26 ; Status:Complete;    Done: 33XCO6342   Ordered; For:Celiac disease, Fatigue, Hypoglycemia, Insulin pump in place, Noncompliance, Type I diabetes mellitus, uncontrolled; Ordered By:Ortega Collier;   · 2 - Ann Africa Webb DPM  (Podiatry) Physician Referral  Consult  Status: Hold For -  Scheduling  Requested for: 93CDH9854   Ordered;  For: Celiac disease, Fatigue, Hypoglycemia, Insulin pump in place, Noncompliance, Type I diabetes mellitus, uncontrolled; Ordered By: Francis Lugo Performed:  Due: 93YJB1786  Care Summary provided  : Yes  Celiac disease, Fatigue, Hypoglycemia, Noncompliance, Type I diabetes mellitus,  uncontrolled    · (1) VITAMIN D 25-HYDROXY; Status:Active; Requested for:21Nov2016;    Perform:[facility]; XMS:84NMT2255;VFQNYFT; For:Celiac disease, Fatigue, Hypoglycemia, Noncompliance, Type I diabetes mellitus, uncontrolled; Ordered By:Ortega Collier; Controlled diabetes mellitus type II without complication, Hypoglycemia, Insulin pump in  place, Noncompliance, Type I diabetes mellitus, uncontrolled    · (1) BETA HYDROXYBUTERATE; Status:Resulted - Requires Verification;   Done:  47KOG0356 11:53AM   Due:52Ndz6309;Ordered; Stat;  For:Controlled diabetes mellitus type II without complication, Hypoglycemia, Insulin pump in place, Noncompliance, Type I diabetes mellitus, uncontrolled; Ordered By:Ortega Collier; Fatigue, Hypoglycemia, Noncompliance, Type I diabetes mellitus, uncontrolled    · *1 - [facility] Physician Referral  Consult   Status: Need Information - Financial Authorization  Requested for: 40WIW9832   Ordered; For: Fatigue, Hypoglycemia, Noncompliance, Type I diabetes mellitus, uncontrolled; Ordered By: Mallory Bowen Performed:  Due: 97IHB9752  Care Summary provided  : Yes   · Follow-up visit in 2 months Evaluation and Treatment  Follow-up  Status: Hold For -  Scheduling  Requested for: 16QWG3156   Ordered; For: Fatigue, Hypoglycemia, Noncompliance, Type I diabetes mellitus, uncontrolled; Ordered By: Mallory Bowen Performed:  Due: 71QCF0370   · Follow-Up With Advanced Practitioner Evaluation and Treatment  Follow-up  Status: Hold  For - Scheduling  Requested for: 28OJD3655   Ordered; For: Fatigue, Hypoglycemia, Noncompliance, Type I diabetes mellitus, uncontrolled; Ordered By: Mallory Bowen Performed:  Due: 38Emw4844  Type I diabetes mellitus, uncontrolled    · BuPROPion HCl ER (SR) 150 MG Oral Tablet Extended Release 12 Hour   Rx By: Kartik Weber; Dispense: 0 Days ; #:180 Tablet Extended Release 12 Hour; Refill: 1; For: Type I diabetes mellitus, uncontrolled; MAGGY = N; Record;  Last Updated By: Jaime Roque Abdiel Darby; 10/3/2016 8:41:24 AM   · Fingerstick - POC; Status:Complete - Retrospective By Protocol Authorization;   Done:  84ISE1883 08:45AM   Performed: In Office; [phone]; Last Updated Milton Cabrera; 10/3/2016 8:45:59 AM;Ordered; For:Type I diabetes mellitus, uncontrolled; Ordered By:Chandrika Collier; Unlinked    · Levemir 100 UNIT/ML Subcutaneous Solution   Dispense: 0 Days ; #:0 ML; Refill: 0; MAGGY = N; Record; Last Updated By: Jayne Carr; 10/3/2016 8:41:20 AM    Discussion/Summary  Discussion Summary:   Pls make following changes  pl download pump every 2-3weeks  pls make arrangement for personal CGM  Counseling Documentation With Imm: The patient was counseled regarding diagnostic results, instructions for management, risk factor reductions, impressions, risks and benefits of treatment options, importance of compliance with treatment  Medication SE Review and Pt Understands Tx: The treatment plan was reviewed with the patient/guardian  The patient/guardian understands and agrees with the treatment plan      Chief Complaint  Chief Complaint Free Text Note Form: Follow up      History of Present Illness  HPI: Just made changes to her pump on Friday, feels better  Sometimes forgets to bolus for meal or snack  She loves to eat at different times  Lo blood sugar is between 55-70  No DKA  Symptoms have improved  Amenable to make progress  Has not had eye exam more than a year ago  No prior podiatrist visit  Has decreased awareness of hypoglycemia  says her insurance did not cover personal CGM in the past    Diabetes: The patient is being seen for routine follow-up of Diabetes Mellitus 1  The HbA1c was   See Medication List for current medication(s)  By report, there is poor compliance with treatment, fair tolerance of treatment and fair symptom control  Current pertinent lifestyle factors include no obesity  The patient is currently asymptomatic Review of Systems  ROS Reviewed:   ROS reviewed     Endo Adult ROS Female Established v2 Update - [facility]:   Constitutional/General: recent weight gain, no recent weight loss, poor energy/fatigue, no increased energy level, no insomnia/sleep problems, no fever and no feeling weak  Breasts: no nipple discharge  Heart: no high blood pressure, no chest pain/tightness, no rapid/racing heart rate and no palpitations  Genitourinary - Urinary: frequent urination and urinating during the night, but no excess urination  Eyes: blurred vision and double vision, but no bulging eyes, no gritty/scratchy eyes and no excessive tearing  Mouth / Throat: no hoarseness and no difficulty swallowing  Neck: no lumps, no swollen glands, no neck pain, no neck stiffness and no enlarged thyroid  Respiratory: no wheezing, no asthma and no persistent cough  Musculoskeletal: no muscle aches/pain, no joint aches/pain and no muscle weakness  Skin & Hair: no dry skin, no acne, the hair texture was not oily, hair loss and no excessive hair growth  Gastrointestinal: no constipation, no diarrhea, no waking at night to drink and no stomach ache  Neurological: no blackouts, no weakness and no tremors  Reproductive: periods are not regular, discomfort with periods, no excessive bleeding with periods and mood swings   frequency of period is not applicable  duration of period is not applicable  Date of last menstruation is not applicable  Endocrine: no feeling hot frequently, no feeling cold frequently, no shifts between feeling hot and cold, no cold hands or feet, no excessive sweating, no thyroid problems, blood sugar problems, excessive thirst, excessive hunger, no change in shoe size, no nausea or vomiting and no shaky hands  Active Problems    1  Abdominal pain, RUQ (789 01) (R10 11)   2  Abnormal glucose (790 29) (R73 09)   3  Acute bronchitis (466 0) (J20 9)   4  Acute Otitis Externa Of The Right Ear (380 22)   5  Acute sinusitis (461 9) (J01 90)   6   Anxiety (300 00) (F41 9)   7  Asthmatic bronchitis with exacerbation (493 92) (J45 901)   8  Celiac disease (579 0) (K90 0)   9  Controlled diabetes mellitus type II without complication (871 38) (F83 1)   10  Cough (786 2) (R05)   11  Depression (311) (F32 9)   12  Dizziness (780 4) (R42)   13  Encounter for smoking cessation counseling (V65 42,305 1) (Z71 6,Z72 0)   14  Fatigue (780 79) (R53 83)   15  Hypoglycemia (251 2) (E16 2)   16  Insulin pump in place (V45 85) (Z96 41)   17  Noncompliance (V15 81) (Z91 19)   18  Other acute sinusitis (461 8) (J01 80)   19  Pap smear for cervical cancer screening (V76 2) (Z12 4)   20  Type I diabetes mellitus, uncontrolled (250 03) (E10 65)   21  Upper respiratory tract infection, unspecified type (465 9) (J06 9)   22  Vertigo (780 4) (R42)    Past Medical History    1  History of Depression (311) (F32 9)   2  Encounter for smoking cessation counseling (V65 42,305 1) (Z71 6,Z72 0)   3  History of hypertension (V12 59) (Z86 79)  Active Problems And Past Medical History Reviewed: The active problems and past medical history were reviewed and updated today  Surgical History    1  Denied: History Of Prior Surgery  Surgical History Reviewed: The surgical history was reviewed and updated today  Family History  Mother    1  No pertinent family history  Maternal Grandfather    2  Family history of Malignant Pancreatic Neoplasm  Paternal Grandfather    3  Family history of Stroke Syndrome (V17 1)  Family History    4  Family history of Family Health Status Of Father - Alive   5  Family history of Family Health Status Of Mother - Alive  Family History Reviewed: The family history was reviewed and updated today  Social History    · Being A Social Drinker   · Caffeine Use   · Current every day smoker (305 1) (F17 200)   · Denied: History of Drug Use  Social History Reviewed: The social history was reviewed and updated today  Current Meds   1   Accu-Chek FastClix Lancets Miscellaneous; use 8 x daily; Therapy: 27LVW3097 to (Evaluate:37Oqz0641)  Requested for: 82Qwc1868; Last   Rx:21Cih5346 Ordered   2  Accu-Chek SmartView In Vitro Strip; USE 6 TIMES DAILY; Therapy: 80Duy9313 to (Evaluate:25Jun2017)  Requested for: 44KFX0275; Last   Rx:72Bjw7181; Status: ACTIVE - Transmit to Pharmacy - Awaiting Verification Ordered   3  ALPRAZolam 0 25 MG Oral Tablet; take 1/2 to 1 tablet tid prn; Therapy: 18Con4150 to (Last Rx:31Mar2016)  Requested for: 87XID0760 Ordered   4  BD Insulin Syringe Ultrafine 31G X 5/16" 1 ML Miscellaneous; USE ONE DAILY; Therapy: 69DJW0635 to (Evaluate:13Jun2015)  Requested for: 44KYU6483; Last   Rx:19Gor3873 Ordered   5  BuPROPion HCl ER (SR) 150 MG Oral Tablet Extended Release 12 Hour; take  one   tablet tid a day; Therapy: 18Qjd3889 to (Last Rx:14Apr2016)  Requested for: 58Zxv5123 Ordered   6  Glucagon Emergency 1 MG Injection Kit; USE AS DIRECTED; Therapy: 00QSO4809 to (Last Rx:57Pvq8864)  Requested for: 89Srj2181 Ordered   7  Levemir 100 UNIT/ML Subcutaneous Solution; INJECT SUBCUTANEOUSLY EVERY 12   HOURS AS DIRECTED; Therapy: 68YBB4900 to Recorded   8  Mirena IUD; Therapy: ([phone]) to Recorded   9  Nova Max Plus [employer]; use as directed; Therapy: 21Mar2015 to (Susan Solano)  Requested for: 50AOK2294; Last   Rx:03Kqq7975; Status: [address] moira Laura Verification Ordered   10  NovoLOG 100 UNIT/ML Subcutaneous Solution; USE UP TO 50 UNITS VIA PUMP DAILY; Therapy: 68CAD3209 to (0474 77 19 07)  Requested for: 50KGU2883; Last    UP:35FBM5603 Ordered   11  Omeprazole 40 MG Oral Capsule Delayed Release; TAKE 1 CAPSULE DAILY EVERY    MORNING BEFORE BREAKFAST Recorded  Medication List Reviewed: The medication list was reviewed and updated today  Allergies    1   No Known Drug Allergies    Vitals  Vital Signs    Recorded: 47JOG8888 08:43AM Recorded: 79OBZ7120 86:58ZQ   Systolic  94 Diastolic  70    Heart Rate 60     Height   5 ft 1 in   Weight   131 lb 2 00 oz   BMI Calculated   24 78   BSA Calculated   1 58     Physical Exam    Constitutional   General appearance: No acute distress, well appearing and well nourished  Eyes   Conjunctiva and lids: No swelling, erythema, or discharge  Ears, Nose, Mouth, and Throat   Neck: The neck is supple  The thyroid is normal in size with no palpable nodules  Pulmonary   Auscultation of lungs: Clear to auscultation bilaterally with normal chest expansion  Cardiovascular   Auscultation of heart: Normal rate and rhythm with no murmurs, gallops or rubs  Examination of extremities for edema and/or varicosities: Normal     Examination of pulses: Dorsalis pedal pulses are +2 and equal bilaterally  Abdomen   Abdomen: Abdomen is soft, non-tender with normal bowel sounds  Lymphatic   Palpation of lymph nodes: No supraclavicular or suboccipital lymphadenopathy  Skin   Skin and subcutaneous tissue: Normal skin temperature and color  Neurologic   Reflexes: 2+ and symmetric  Motor Strength: Strength is 5/5 bilaterally  Psychiatric   Orientation to person, place and time: Normal     Mood and affect: Affect and attention span are normal        Results/Data  Fingerstick - POC 19ZQA7761 08:45AM Christopher Osman     Test Name Result Flag Reference   Glucose Finger Stick 175       (1) BETA HYDROXYBUTERATE 63IFK1309 11:53AM Shruthima Adjutant Order Number: [id number]     Test Name Result Flag Reference   BHYBUT 1 0 mg/dL     Reference Range: All Ages (fasting): 0 2 - 2 8    Performed at:  [address] [facility]  [address] [place] [address]  [de-identified]  : Adalid Chand MD, Phone:  [phone]     Diagnostic Studies Reviewed: I personally reviewed the films/images/results in the office today  My interpretation follows        Future Appointments    Date/Time Provider Specialty Site   10/18/2016 12:50 PM LILA Abbott   Endocrinology [facility] ENDOCRINOLOGY [address] [place] [address]     Signatures   Electronically signed by : LILA Gudino ; Oct  3 2016  9:09AM EST                       (Author)

## 2018-01-25 DIAGNOSIS — IMO0002 UNCONTROLLED TYPE 1 DIABETES MELLITUS WITH COMPLICATION: Primary | ICD-10-CM

## 2018-01-29 ENCOUNTER — TELEPHONE (OUTPATIENT)
Dept: ENDOCRINOLOGY | Facility: CLINIC | Age: 38
End: 2018-01-29

## 2018-01-29 NOTE — TELEPHONE ENCOUNTER
Pharmacy called to inquire about Contour test strips  PA was initiated, no response from insurance    Rosalio Precise

## 2018-02-01 ENCOUNTER — TELEPHONE (OUTPATIENT)
Dept: ENDOCRINOLOGY | Facility: CLINIC | Age: 38
End: 2018-02-01

## 2018-02-01 NOTE — TELEPHONE ENCOUNTER
Fax rec'd  Contour Next strips approved effective 1/1/18-1/31/19  Spoke to pt who understood  Faxed copy to pharmacy

## 2018-02-07 LAB
ALBUMIN SERPL-MCNC: 4.4 G/DL (ref 3.6–5.1)
BUN SERPL-MCNC: 11 MG/DL (ref 7–25)
BUN/CREAT SERPL: ABNORMAL (CALC) (ref 6–22)
CALCIUM SERPL-MCNC: 9 MG/DL (ref 8.6–10.2)
CHLORIDE SERPL-SCNC: 100 MMOL/L (ref 98–110)
CO2 SERPL-SCNC: 32 MMOL/L (ref 20–31)
CREAT SERPL-MCNC: 0.76 MG/DL (ref 0.5–1.1)
GLUCOSE SERPL-MCNC: 372 MG/DL (ref 65–99)
HBA1C MFR BLD: 9.3 % OF TOTAL HGB
PHOSPHATE SERPL-MCNC: 2.9 MG/DL (ref 2.5–4.5)
POTASSIUM SERPL-SCNC: 4.2 MMOL/L (ref 3.5–5.3)
SL AMB EGFR AFRICAN AMERICAN: 117 ML/MIN/1.73M2
SL AMB EGFR NON AFRICAN AMERICAN: 101 ML/MIN/1.73M2
SODIUM SERPL-SCNC: 134 MMOL/L (ref 135–146)

## 2018-02-12 ENCOUNTER — OFFICE VISIT (OUTPATIENT)
Dept: ENDOCRINOLOGY | Facility: CLINIC | Age: 38
End: 2018-02-12
Payer: COMMERCIAL

## 2018-02-12 VITALS
BODY MASS INDEX: 24.29 KG/M2 | SYSTOLIC BLOOD PRESSURE: 118 MMHG | DIASTOLIC BLOOD PRESSURE: 80 MMHG | HEART RATE: 80 BPM | WEIGHT: 132 LBS | HEIGHT: 62 IN

## 2018-02-12 DIAGNOSIS — E10.9 TYPE 1 DIABETES MELLITUS WITHOUT COMPLICATION (HCC): Primary | ICD-10-CM

## 2018-02-12 LAB
LEFT EYE DIABETIC RETINOPATHY: NORMAL
RIGHT EYE DIABETIC RETINOPATHY: NORMAL
SEVERITY (EYE EXAM): NORMAL

## 2018-02-12 PROCEDURE — 99214 OFFICE O/P EST MOD 30 MIN: CPT | Performed by: INTERNAL MEDICINE

## 2018-02-12 NOTE — PROGRESS NOTES
New Patient Progress Note      Chief Complaint   Patient presents with    Diabetes Type 1      Referring Provider  Quin Oppenheim, [place]  [address], [address]     History of Present Illness:   Blair Montes is a 40 y o  female with a history of type 1 diabetes with long term use of insulin since 6-7 yrs   Diabetes course has been un-stable  Reports No  complications of diabetes  Denies recent illness or hospitalizations  Denies recent severe hypoglycemic or severe hyperglycemic episodes  Denies any issues with her current regimen  home glucose monitoring: are performed regularly    Home blood glucose readings:   Before breakfast: 120-180   Before lunch: does not check   Before dinner: 70-80 mg/dl   Bedtime: 300-400 mg/dl     Current regimen: [employer] insulin pump, 670 G, she is currently not using sensor as she does not like it, as it is beeping all the time  Basal rates 24 hour total is 25 units, 12 midnight-0 85 units/hour, 3:00 a m -0 85 units/hour, 8:00 a m -1 1 unit/hour, 3:00 p m -1 2 units/hour, 8:00 p m  -1 point 0 0 units/hour  Insulin to carb ratio, 12 midnight 18, 3:00 a m  18, 6:00 a m  15, 3:00 p m  15, 8:00 p m  15  Insulin sensitivity factor, 12 midnight -50, 3:00 a m  -40, 6:00 a m -55, 3:00 p m -55, 8:00 p m -40  Target blood sugars 110      She is not entering blood sugars in bolus wizards, sometimes she is not entering right amount of carbs as she is afraid of low after words  Stated blood sugars drops at evening ,she eats with out bolous and blood sugars elevated later on, if she takes correction bolus , then blood sugars drops     Hypoglycemic episodes: Yes intermittent  H/o of hypoglycemia causing hospitalization or Intervention such as glucagon injection  or ambulance call :  No  Hypoglycemia symptoms: jitteriness and sweating  Treatment of hypoglycemia:   She has active glucagon injection    Medic alert tag: recommended: Yes    Activity: Daily activity is predictable Yes The patient engages in an overly vigorous exercise regimen  further diabetic ROS: no polyuria or polydipsia, no chest pain, dyspnea or TIAs, no numbness, tingling or pain in extremities      acute symptoms are none       Opthamology: sees regularly ; no retinopathy, no macular edema  Podiatry: none     Infuenza vaccine: yes     Thyroid disorders: none   History of pancreatitis: none     There is no problem list on file for this patient  Past Medical History:   Diagnosis Date    Celiac disease     Type 1 diabetes ([facility] [address] )       Past Surgical History:   Procedure Laterality Date    COLONOSCOPY        Family History   Problem Relation Age of Onset    Hypertension Mother     Hypertension Father     Melanoma Paternal Aunt     Skin cancer Family      Social History   Substance Use Topics    Smoking status: Current Every Day Smoker    Smokeless tobacco: Never Used    Alcohol use Yes      Comment: occasionally     No Known Allergies    Current Outpatient Prescriptions:     glucagon (GLUCAGON EMERGENCY) 1 MG injection, Inject as directed, Disp: , Rfl:     glucose blood (IVONNE CONTOUR NEXT TEST) test strip, Check BS 8 times daily with insulin pump, Disp: 720 each, Rfl: 1    insulin aspart (NOVOLOG FLEXPEN) 100 Units/mL SOPN, Inject under the skin, Disp: , Rfl:     insulin detemir (LEVEMIR FLEXPEN) subcutaneous injection pen 100 units/mL, Inject under the skin, Disp: , Rfl:     Ketone Blood Test STRP, by In Vitro route, Disp: , Rfl:     levonorgestrel (MIRENA) 20 MCG/24HR IUD, 20 mcg/day, Disp: , Rfl:     NOVOLOG 100 UNIT/ML injection, USE UP TO 50 UNITS VIA PUMP DAILY, Disp: , Rfl: 1    Urine Glucose-Ketones Test STRP, Check urine ketones when blood sugar >300, if +ve call MD, Disp: 100 strip, Rfl: 3    Review of Systems   Constitutional: Negative  HENT: Negative  Eyes: Negative  Respiratory: Negative  Cardiovascular: Negative  Gastrointestinal: Negative      Endocrine: Negative  Genitourinary: Negative  Musculoskeletal: Negative  Allergic/Immunologic: Negative  Neurological: Negative  Hematological: Negative  Psychiatric/Behavioral: Negative  Physical Exam:  Body mass index is 24 34 kg/m²  /80   Pulse 80   Ht 5' 1 75" (1 568 m)   Wt 59 9 kg (132 lb)   BMI 24 34 kg/m²      Wt Readings from Last 3 Encounters:   02/12/18 59 9 kg (132 lb)   10/19/17 59 9 kg (132 lb)   03/13/17 59 kg (130 lb 2 1 oz)       Physical Exam   Constitutional: She is oriented to person, place, and time  She appears well-developed and well-nourished  No distress  HENT:   Head: Normocephalic and atraumatic  Eyes: Conjunctivae and EOM are normal  Right eye exhibits no discharge  Left eye exhibits no discharge  Neck: Normal range of motion  Neck supple  No thyromegaly present  Cardiovascular: Normal rate, regular rhythm and normal heart sounds  No murmur heard  Pulses:       Dorsalis pedis pulses are 2+ on the right side, and 2+ on the left side  Posterior tibial pulses are 2+ on the right side, and 2+ on the left side  Pulmonary/Chest: Effort normal and breath sounds normal  No respiratory distress  She has no wheezes  Musculoskeletal: Normal range of motion  She exhibits no edema  Feet:   Right Foot:   Skin Integrity: Negative for ulcer, skin breakdown, erythema, warmth, callus or dry skin  Left Foot:   Skin Integrity: Negative for ulcer, skin breakdown, erythema, warmth, callus or dry skin  Neurological: She is alert and oriented to person, place, and time  She has normal reflexes  Skin: Skin is warm and dry  No rash noted  She is not diaphoretic  No erythema  Psychiatric: She has a normal mood and affect  Her behavior is normal    Vitals reviewed  Patient's shoes and socks removed  Right Foot/Ankle   Right Foot Inspection  Skin Exam: skin normal and skin intact no dry skin, no warmth, no callus, no erythema, no maceration, no abnormal color, no pre-ulcer, no ulcer and no callus                          Toe Exam: ROM and strength within normal limits  Sensory   Vibration: intact      Vascular    The right DP pulse is 2+  The right PT pulse is 2+  Left Foot/Ankle  Left Foot Inspection  Skin Exam: skin normal and skin intactno dry skin, no warmth, no erythema, no maceration, normal color, no pre-ulcer, no ulcer and no callus                         Toe Exam: ROM and strength within normal limits                   Sensory   Vibration: intact    Monofilament: intact  Vascular    The left DP pulse is 2+  The left PT pulse is 2+  Labs:   No components found for: HA1C  No results found for: GLU    Lab Results   Component Value Date    CREATININE 0 76 02/06/2018    CREATININE 0 73 10/14/2017    CREATININE 0 68 03/08/2017    BUN 11 02/06/2018     10/14/2017    K 4 1 10/14/2017     10/14/2017    CO2 29 10/14/2017     eGFR   Date Value Ref Range Status   03/08/2017 >60 0 ml/min/1 73sq m Final     No components found for: [facility]    Lab Results   Component Value Date    CHOL 143 10/14/2017    HDL 55 10/14/2017    TRIG 53 10/14/2017       Lab Results   Component Value Date    ALT 26 09/28/2016    AST 12 09/28/2016    ALKPHOS 61 09/28/2016    BILITOT 0 60 09/28/2016       Lab Results   Component Value Date    FREET4 1 1 10/14/2017       Impression:  1  Type 1 diabetes mellitus without complication ([facility] [address] )           Plan:    Joann Gant was seen today for diabetes type 1  Diagnoses and all orders for this visit:    Type 1 diabetes mellitus without complication (HCC)    Y7C uncontrolled 9 3%  Following changes made in pump settings, reduce basal at 6:00 a m  0 85, 3:00 p m  0 85 and at 8:00 p m  to 1 00(patient has hypoglycemia before dinner)  Change insulin sensitivity factor to 55 at 8:00 p m  and 50 at 3:00 a m  She is currently not using sensor    Discussed importance of using sensor at it helps with the better glycemic control  Discussed to schedule appointment with diabetes educator in 2 weeks, she is having issues with the sensor, and new pump  However patient is very busy and cannot take off from work, she stated she will call [employer] representative, to resolve the problem  Discussed to call if blood sugar less than 70 or more than 300  Discussed to download the pump in 2 weeks and sent to us for further adjustment  Discussed importance of controlling diabetes to 6 5%, as well as complications of uncontrolled diabetes  See instructions  Will order following blood work before next appointment  -     Basic metabolic panel Lab Collect; Future  -     Hemoglobin A1c Lab Collect; Future  -     TSH, 3rd generation Lab Collect; Future  -     Cancel: TSH, 3rd generation Lab Collect; Future  -     Microalbumin / creatinine urine ratio; Future  -     Lipid panel- Lab Collect; Future  -     Ambulatory referral to Ophthalmology; Future  -     Urine Glucose-Ketones Test STRP; Check urine ketones when blood sugar >300, if +ve call MD  -     Glutamic acid decarboxylase Lab Collect; Future  -     Anti-islet cell antibody; Future    Other orders  -     Cancel: Ketone Blood Test STRP; by In Vitro route      There are no diagnoses linked to this encounter  Discussed with the patient and all questioned fully answered  She will call me if any problems arise      Counseled patient on diagnostic results, prognosis, risk and benefit of treatment options, instruction for management, importance of treatment compliance, Risk  factor reduction and impressions      Roxy Skiff, MD

## 2018-02-12 NOTE — PATIENT INSTRUCTIONS
Diabetes instructions    1  Keep carbohydrate amounts consistent with meals  2  Increase physical activity and follow a healthy diet to lose weight  3  Try to get regular exercise most days of the week  Start slowly and increase as tolerated  Goal is 150 minutes per week spread out over at least 3 days a week  4  Blood pressure goal is less than 130/80 to help keep heart, kidneys and limbs healthy  5  Cholesterol goals are: LDL<70, Triglycerides< 150, HDL>40 (man) >50 (woman)  6  Treat low blood sugar with the rule of 15's   7  Carry rapid acting carbohydrate (glucose tablets, juice, or hard candy) at all times to treat low blood sugar  8  You should have Yearly urine microalbumin to test health of kidneys  9  Check feet every day to prevent sores and possible loss of limb  10  Wear a medic alert tag for diabetes  11  Obtain flu shot yearly  12  Obtain pneumovax through your PCP if not yet received  13  Test your blood sugar as directed during your visit   14  Call if blood sugar is less than 60 or greater than 400  15    Your target blood sugar is between 80- 160 mg/dl

## 2018-02-20 ENCOUNTER — TELEPHONE (OUTPATIENT)
Dept: ENDOCRINOLOGY | Facility: CLINIC | Age: 38
End: 2018-02-20

## 2018-02-20 NOTE — TELEPHONE ENCOUNTER
She needs to calibrate CGMS 2-3 times daily, in order to get accurate results,   If she is still having elevated blood sugars before lunch, will have to change her insulin to carb ratio  During visit I recommended to follow-up with diabetes educator, so that we can address the issue of sensor  Or she should call [employer] representative to resolve the sensor problem     Thanks     Luis Ruiz MD

## 2018-02-20 NOTE — TELEPHONE ENCOUNTER
Patient called to report that she is using CGM  She reports that she does not feel it is always accurate  For instance, this morning the CGM stated her FS was 115  2 hrs later - no food/carbs --251  She doesn't understand how this can be and wanted you to be aware  She states she is following all your directions and is compliant

## 2018-02-21 NOTE — TELEPHONE ENCOUNTER
Patient left message; she felt I did not understand her but what she reiterated in her message is exactly what I understood and explained in this encounter  Left patient another message that I did understand her and what Dr Magnus Owens recommended stands

## 2018-03-14 ENCOUNTER — TELEPHONE (OUTPATIENT)
Dept: ENDOCRINOLOGY | Facility: CLINIC | Age: 38
End: 2018-03-14

## 2018-03-14 NOTE — TELEPHONE ENCOUNTER
Received fax requesting clinical data for authorization  We did not send for anything  Left message for patient to call back and let us know if she if following different endo

## 2018-04-05 DIAGNOSIS — E10.8 TYPE 1 DIABETES MELLITUS WITH COMPLICATION (HCC): Primary | ICD-10-CM

## 2018-06-11 ENCOUNTER — TELEPHONE (OUTPATIENT)
Dept: ENDOCRINOLOGY | Facility: CLINIC | Age: 38
End: 2018-06-11

## 2018-06-14 ENCOUNTER — TELEPHONE (OUTPATIENT)
Dept: FAMILY MEDICINE CLINIC | Facility: MEDICAL CENTER | Age: 38
End: 2018-06-14

## 2018-06-14 NOTE — TELEPHONE ENCOUNTER
C/O MULTIPLE SYMPTOMS   SWOLLEN GLANDS , DIZZINESS, FATIGUE, ABDOMINAL PAIN, DIARRHEA AT NIGHT  TYPE 1 DM  SAYS HER SUGARS ARE OK, ALSO HAS IBS, EXPLAINED TO PATIENT DR RODRIGUEZ IS FINISHED FOR THE DAY  [facility] CAN SEE HER TOMORROW   I ALSO ASKED HER TO CALL HER GI DOCTOR TO INFORM THEM OF HER ABDOMINAL ISSUES AND SEE WHAT THEY ADVISE IN THE MEANTIME   APPT TOMORROW SET UP

## 2018-06-15 ENCOUNTER — OFFICE VISIT (OUTPATIENT)
Dept: FAMILY MEDICINE CLINIC | Facility: MEDICAL CENTER | Age: 38
End: 2018-06-15
Payer: COMMERCIAL

## 2018-06-15 ENCOUNTER — HOSPITAL ENCOUNTER (OUTPATIENT)
Dept: RADIOLOGY | Age: 38
Discharge: HOME | End: 2018-06-15
Payer: COMMERCIAL

## 2018-06-15 ENCOUNTER — TELEPHONE (OUTPATIENT)
Dept: FAMILY MEDICINE CLINIC | Facility: MEDICAL CENTER | Age: 38
End: 2018-06-15

## 2018-06-15 VITALS
WEIGHT: 131.8 LBS | TEMPERATURE: 98.3 F | HEART RATE: 82 BPM | DIASTOLIC BLOOD PRESSURE: 62 MMHG | BODY MASS INDEX: 24.3 KG/M2 | OXYGEN SATURATION: 98 % | SYSTOLIC BLOOD PRESSURE: 100 MMHG

## 2018-06-15 DIAGNOSIS — R10.11 RUQ ABDOMINAL PAIN: Primary | ICD-10-CM

## 2018-06-15 DIAGNOSIS — R10.11 RUQ ABDOMINAL PAIN: ICD-10-CM

## 2018-06-15 DIAGNOSIS — R73.09 ELEVATED GLUCOSE: ICD-10-CM

## 2018-06-15 DIAGNOSIS — R42 DIZZINESS: ICD-10-CM

## 2018-06-15 LAB
SL AMB POCT GLUCOSE BLD: 230
SL AMB POCT HEMOGLOBIN AIC: 9.7

## 2018-06-15 PROCEDURE — 76700 US EXAM ABDOM COMPLETE: CPT

## 2018-06-15 PROCEDURE — 82948 REAGENT STRIP/BLOOD GLUCOSE: CPT | Performed by: FAMILY MEDICINE

## 2018-06-15 PROCEDURE — 99214 OFFICE O/P EST MOD 30 MIN: CPT | Performed by: FAMILY MEDICINE

## 2018-06-15 PROCEDURE — 83036 HEMOGLOBIN GLYCOSYLATED A1C: CPT | Performed by: FAMILY MEDICINE

## 2018-06-15 RX ORDER — LEVOCETIRIZINE DIHYDROCHLORIDE 5 MG/1
5 TABLET, FILM COATED ORAL DAILY
Refills: 6 | COMMUNITY
Start: 2018-05-23 | End: 2019-08-01 | Stop reason: ALTCHOICE

## 2018-06-15 RX ORDER — HYDROCHLOROTHIAZIDE 12.5 MG/1
12.5 TABLET ORAL DAILY
Qty: 90 TABLET | Refills: 0 | Status: SHIPPED | OUTPATIENT
Start: 2018-06-15 | End: 2019-08-01 | Stop reason: ALTCHOICE

## 2018-06-15 RX ORDER — RANITIDINE 300 MG/1
300 TABLET ORAL
Refills: 6 | COMMUNITY
Start: 2018-05-23 | End: 2018-09-05 | Stop reason: ALTCHOICE

## 2018-06-15 RX ORDER — PANTOPRAZOLE SODIUM 40 MG/1
TABLET, DELAYED RELEASE ORAL
Refills: 6 | COMMUNITY
Start: 2018-05-23 | End: 2021-08-16 | Stop reason: ALTCHOICE

## 2018-06-15 NOTE — PROGRESS NOTES
Assessment/Plan:    No problem-specific Assessment & Plan notes found for this encounter  Diagnoses and all orders for this visit:    RUQ abdominal pain  -     US liver; Future  Symptoms and exam findings worrisome for acute cholecystitis  Will have patient get a stat right upper quadrant ultrasound for evaluation  Dizziness  -     hydrochlorothiazide (HYDRODIURIL) 12 5 mg tablet; Take 1 tablet (12 5 mg total) by mouth daily  May be due to vertigo versus Meniere's disease  I recommended a trial of hydrochlorothiazide low dose  Potential medication side effects discussed  Patient is agreeable to trying the medication  I did fax in the 12 5 mg tablets and asked patient to cut them in half  She is to call the office and stop the medication right away if she gets symptomatic  Elevated glucose  -     POCT hemoglobin A1c  -     POCT blood glucose  Sugars were elevated in the office today as was the A1c  Values that were not much different from previous values  This is unlikely to be diabetic ketoacidosis  Other orders  -     levocetirizine (XYZAL) 5 MG tablet; Take 5 mg by mouth daily  -     pantoprazole (PROTONIX) 40 mg tablet; TAKE 1 TABLET IN THE MORNING 30 MINUTES BEFORE BREAKFAST  -     ranitidine (ZANTAC) 300 MG tablet; Take 300 mg by mouth daily at bedtime    Follow-up with PCP in one month or sooner if needed  Addendum:  Ultrasound was negative  Patient was instructed to take the hydrochlorothiazide  Follow-up as above  Subjective:      Patient ID: Kallie Heimlich is a 41 y o  female  Patient presents with two week history of right-sided abdominal pain, radiation of pain to the back, diarrhea, dizziness with room spinning sensation and swollen nodule on the left side of her face  She does have some fatigue as well  She has a history of celiac disease  She has type 1 diabetes  She is followed by Endocrinology  She does have an insulin pump    It has been over three months since her last A1c  She has meniers disease  She was seen by ENT who diagnosed her as having this  He recommended she maintain a low-salt diet but patient did not have the formal testing to establish a diagnosis as the test is not performed locally and she would have to go to [place]  She was seen by her gastroenterologist yesterday who did order some blood work as well as an abdominal ultrasound of the right upper quadrant  Patient has not had any of the testing done yet  The following portions of the patient's history were reviewed and updated as appropriate: allergies, current medications and problem list     Review of Systems   Constitutional: Negative for fever  Respiratory: Negative for shortness of breath  Cardiovascular: Negative for chest pain  Objective:      /62 (BP Location: Left arm, Patient Position: Sitting, Cuff Size: Adult)   Pulse 82   Temp 98 3 °F (36 8 °C)   Wt 59 8 kg (131 lb 12 8 oz)   SpO2 98%   BMI 24 30 kg/m²          Physical Exam   Constitutional: She is oriented to person, place, and time  Vital signs are normal  She appears well-developed and well-nourished  HENT:   Head: Normocephalic and atraumatic  Right Ear: Tympanic membrane, external ear and ear canal normal    Left Ear: Tympanic membrane, external ear and ear canal normal    Nose: Nose normal    Mouth/Throat: Uvula is midline, oropharynx is clear and moist and mucous membranes are normal    No obvious swelling of the left side of the face  Eyes: Conjunctivae, EOM and lids are normal  Pupils are equal, round, and reactive to light  Neck: Trachea normal  Neck supple  No thyromegaly present  Cardiovascular: Normal rate, regular rhythm, S1 normal and S2 normal     No murmur heard  Pulmonary/Chest: Effort normal and breath sounds normal    Abdominal: Soft  Bowel sounds are normal  There is no hepatosplenomegaly  There is tenderness in the right upper quadrant   There is rigidity, guarding and positive Porter's sign  Lymphadenopathy:     She has no cervical adenopathy  Neurological: She is alert and oriented to person, place, and time  Patient became dizzy during the examination  Skin: Skin is warm and dry     Psychiatric: Her speech is normal and behavior is normal  Judgment and thought content normal  Cognition and memory are normal

## 2018-07-14 ENCOUNTER — APPOINTMENT (OUTPATIENT)
Dept: LAB | Facility: MEDICAL CENTER | Age: 38
End: 2018-07-14
Payer: COMMERCIAL

## 2018-07-14 ENCOUNTER — TRANSCRIBE ORDERS (OUTPATIENT)
Dept: ADMINISTRATIVE | Facility: HOSPITAL | Age: 38
End: 2018-07-14

## 2018-07-14 DIAGNOSIS — R10.13 ABDOMINAL PAIN, EPIGASTRIC: ICD-10-CM

## 2018-07-14 DIAGNOSIS — R19.4 FREQUENT BOWEL MOVEMENTS: ICD-10-CM

## 2018-07-14 DIAGNOSIS — R10.11 ABDOMINAL PAIN, RIGHT UPPER QUADRANT: Primary | ICD-10-CM

## 2018-07-14 DIAGNOSIS — R19.4 CHANGE IN BOWEL HABITS: ICD-10-CM

## 2018-07-14 DIAGNOSIS — R19.7 DIARRHEA OF PRESUMED INFECTIOUS ORIGIN: ICD-10-CM

## 2018-07-14 LAB
ALBUMIN SERPL BCP-MCNC: 4.2 G/DL (ref 3.5–5)
ALP SERPL-CCNC: 49 U/L (ref 46–116)
ALT SERPL W P-5'-P-CCNC: 18 U/L (ref 12–78)
ANION GAP SERPL CALCULATED.3IONS-SCNC: 6 MMOL/L (ref 4–13)
AST SERPL W P-5'-P-CCNC: 12 U/L (ref 5–45)
BASOPHILS # BLD AUTO: 0.02 THOUSANDS/ΜL (ref 0–0.1)
BASOPHILS NFR BLD AUTO: 0 % (ref 0–1)
BILIRUB DIRECT SERPL-MCNC: 0.18 MG/DL (ref 0–0.2)
BILIRUB SERPL-MCNC: 0.86 MG/DL (ref 0.2–1)
BUN SERPL-MCNC: 10 MG/DL (ref 5–25)
CALCIUM SERPL-MCNC: 9 MG/DL (ref 8.3–10.1)
CHLORIDE SERPL-SCNC: 104 MMOL/L (ref 100–108)
CO2 SERPL-SCNC: 27 MMOL/L (ref 21–32)
CREAT SERPL-MCNC: 0.69 MG/DL (ref 0.6–1.3)
EOSINOPHIL # BLD AUTO: 0.17 THOUSAND/ΜL (ref 0–0.61)
EOSINOPHIL NFR BLD AUTO: 3 % (ref 0–6)
ERYTHROCYTE [DISTWIDTH] IN BLOOD BY AUTOMATED COUNT: 12.2 % (ref 11.6–15.1)
ERYTHROCYTE [SEDIMENTATION RATE] IN BLOOD: 4 MM/HOUR (ref 0–20)
GFR SERPL CREATININE-BSD FRML MDRD: 112 ML/MIN/1.73SQ M
GLUCOSE P FAST SERPL-MCNC: 203 MG/DL (ref 65–99)
HCT VFR BLD AUTO: 44.2 % (ref 34.8–46.1)
HEMOCCULT STL QL IA: NEGATIVE
HGB BLD-MCNC: 14.5 G/DL (ref 11.5–15.4)
IMM GRANULOCYTES # BLD AUTO: 0.02 THOUSAND/UL (ref 0–0.2)
IMM GRANULOCYTES NFR BLD AUTO: 0 % (ref 0–2)
LIPASE SERPL-CCNC: 77 U/L (ref 73–393)
LYMPHOCYTES # BLD AUTO: 2.41 THOUSANDS/ΜL (ref 0.6–4.47)
LYMPHOCYTES NFR BLD AUTO: 39 % (ref 14–44)
MCH RBC QN AUTO: 30.1 PG (ref 26.8–34.3)
MCHC RBC AUTO-ENTMCNC: 32.8 G/DL (ref 31.4–37.4)
MCV RBC AUTO: 92 FL (ref 82–98)
MONOCYTES # BLD AUTO: 0.57 THOUSAND/ΜL (ref 0.17–1.22)
MONOCYTES NFR BLD AUTO: 9 % (ref 4–12)
NEUTROPHILS # BLD AUTO: 3.04 THOUSANDS/ΜL (ref 1.85–7.62)
NEUTS SEG NFR BLD AUTO: 49 % (ref 43–75)
NRBC BLD AUTO-RTO: 0 /100 WBCS
PLATELET # BLD AUTO: 110 THOUSANDS/UL (ref 149–390)
PMV BLD AUTO: 12.9 FL (ref 8.9–12.7)
POTASSIUM SERPL-SCNC: 4.2 MMOL/L (ref 3.5–5.3)
PROT SERPL-MCNC: 7 G/DL (ref 6.4–8.2)
RBC # BLD AUTO: 4.81 MILLION/UL (ref 3.81–5.12)
SODIUM SERPL-SCNC: 137 MMOL/L (ref 136–145)
WBC # BLD AUTO: 6.23 THOUSAND/UL (ref 4.31–10.16)

## 2018-07-14 PROCEDURE — 83690 ASSAY OF LIPASE: CPT | Performed by: NURSE PRACTITIONER

## 2018-07-14 PROCEDURE — 80048 BASIC METABOLIC PNL TOTAL CA: CPT | Performed by: NURSE PRACTITIONER

## 2018-07-14 PROCEDURE — 87493 C DIFF AMPLIFIED PROBE: CPT | Performed by: NURSE PRACTITIONER

## 2018-07-14 PROCEDURE — 89055 LEUKOCYTE ASSESSMENT FECAL: CPT | Performed by: NURSE PRACTITIONER

## 2018-07-14 PROCEDURE — 80076 HEPATIC FUNCTION PANEL: CPT | Performed by: NURSE PRACTITIONER

## 2018-07-14 PROCEDURE — 85025 COMPLETE CBC W/AUTO DIFF WBC: CPT | Performed by: NURSE PRACTITIONER

## 2018-07-14 PROCEDURE — 36415 COLL VENOUS BLD VENIPUNCTURE: CPT | Performed by: NURSE PRACTITIONER

## 2018-07-14 PROCEDURE — 87505 NFCT AGENT DETECTION GI: CPT | Performed by: NURSE PRACTITIONER

## 2018-07-14 PROCEDURE — 87338 HPYLORI STOOL AG IA: CPT | Performed by: NURSE PRACTITIONER

## 2018-07-14 PROCEDURE — G0328 FECAL BLOOD SCRN IMMUNOASSAY: HCPCS

## 2018-07-14 PROCEDURE — 85652 RBC SED RATE AUTOMATED: CPT | Performed by: NURSE PRACTITIONER

## 2018-07-15 LAB
C DIFF TOX GENS STL QL NAA+PROBE: NORMAL
CAMPYLOBACTER DNA SPEC NAA+PROBE: NORMAL
SALMONELLA DNA SPEC QL NAA+PROBE: NORMAL
SHIGA TOXIN STX GENE SPEC NAA+PROBE: NORMAL
SHIGELLA DNA SPEC QL NAA+PROBE: NORMAL

## 2018-07-17 LAB — H PYLORI AG STL QL IA: NEGATIVE

## 2018-07-19 LAB — WBC SPEC QL GRAM STN: NORMAL

## 2018-08-09 ENCOUNTER — TELEPHONE (OUTPATIENT)
Dept: ENDOCRINOLOGY | Facility: CLINIC | Age: 38
End: 2018-08-09

## 2018-08-09 NOTE — TELEPHONE ENCOUNTER
Received records request from Kenji Garcia 8/9/18 , called pt and left message that we need a records release signed

## 2018-08-16 NOTE — TELEPHONE ENCOUNTER
Patient called back today 8/16/18 saying she got the message and wants records to be sent to [address] [place] [address] [address]  Called patient back but got voicemail, left message for her to call back so we can send her release of health information

## 2018-09-05 ENCOUNTER — OFFICE VISIT (OUTPATIENT)
Dept: FAMILY MEDICINE CLINIC | Facility: MEDICAL CENTER | Age: 38
End: 2018-09-05
Payer: COMMERCIAL

## 2018-09-05 VITALS
RESPIRATION RATE: 16 BRPM | BODY MASS INDEX: 22.37 KG/M2 | SYSTOLIC BLOOD PRESSURE: 110 MMHG | DIASTOLIC BLOOD PRESSURE: 70 MMHG | HEART RATE: 84 BPM | WEIGHT: 121.3 LBS

## 2018-09-05 DIAGNOSIS — M25.541 ARTHRALGIA OF BOTH HANDS: Primary | ICD-10-CM

## 2018-09-05 DIAGNOSIS — R53.83 FATIGUE, UNSPECIFIED TYPE: ICD-10-CM

## 2018-09-05 DIAGNOSIS — M25.542 ARTHRALGIA OF BOTH HANDS: Primary | ICD-10-CM

## 2018-09-05 DIAGNOSIS — E10.65 TYPE 1 DIABETES MELLITUS WITH HYPERGLYCEMIA, WITH LONG-TERM CURRENT USE OF INSULIN (HCC): ICD-10-CM

## 2018-09-05 DIAGNOSIS — F17.210 CIGARETTE SMOKER: ICD-10-CM

## 2018-09-05 PROCEDURE — 99214 OFFICE O/P EST MOD 30 MIN: CPT | Performed by: FAMILY MEDICINE

## 2018-09-05 NOTE — PROGRESS NOTES
Mable Reid says she has been very fatigued, feels poorly, joint pain, dry eyes, dry mouth  Left sided joint pain knee and hip  Both hands gets swelling over the knuckles  No facial rash  Energy level is not good  Bad since June  Sleeping with more night waking    Sleeps almost 8 hours  No shortness of breath  Occ pleuritic pain  Has not been exercising  Saw ophth   She has insulin dependent diabetes  She has been seeing  [facility] endocrinology but  would like to get another opinion  Diabetes has not been well controlled  Her A1c is ranged from 8-9  Has an appt with Dr Aure Holguin in Oct  She admits she continues to smoke  She would like to quit  O: /70 (Cuff Size: Standard)   Pulse 84   Resp 16   Wt 55 kg (121 lb 4 8 oz)   BMI 22 37 kg/m²     Both hands shows swelling at the PIP joints  No facial rashes noted    Assessment  1  Diabetes-poorly controlled  Will be seeing a new endocrinologist  2  Cigarette smoker-long discussion with regard to risks of cigarette smoking especially with diabetes in light of cardiovascular disease  Advised that she MUST quit smoking  She will use a nicotine patch  3  Fatigue/arthralgias-may have an inflammatory arthritis  Will refer to Rheumatology  Plan  Rheumatology referral   Endocrinology referral   As above

## 2018-09-15 ENCOUNTER — HOSPITAL ENCOUNTER (EMERGENCY)
Facility: HOSPITAL | Age: 38
Discharge: HOME | End: 2018-09-15
Attending: EMERGENCY MEDICINE | Admitting: EMERGENCY MEDICINE
Payer: COMMERCIAL

## 2018-09-15 VITALS
BODY MASS INDEX: 22.89 KG/M2 | HEIGHT: 61 IN | OXYGEN SATURATION: 99 % | RESPIRATION RATE: 16 BRPM | WEIGHT: 121.25 LBS | HEART RATE: 96 BPM | SYSTOLIC BLOOD PRESSURE: 121 MMHG | DIASTOLIC BLOOD PRESSURE: 71 MMHG | TEMPERATURE: 97.8 F

## 2018-09-15 DIAGNOSIS — E13.10 KETOSIS DUE TO DIABETES (HCC): Primary | ICD-10-CM

## 2018-09-15 DIAGNOSIS — E10.65 TYPE 1 DIABETES MELLITUS WITH HYPERGLYCEMIA, WITH LONG-TERM CURRENT USE OF INSULIN (HCC): ICD-10-CM

## 2018-09-15 LAB
ACETONE SERPL-MCNC: ABNORMAL MG/DL
ALBUMIN SERPL BCP-MCNC: 4.6 G/DL (ref 3.5–5)
ALP SERPL-CCNC: 56 U/L (ref 46–116)
ALT SERPL W P-5'-P-CCNC: 26 U/L (ref 12–78)
ANION GAP BLD CALC-SCNC: 21 MMOL/L (ref 4–13)
ANION GAP SERPL CALCULATED.3IONS-SCNC: 10 MMOL/L (ref 4–13)
APTT PPP: 24 SECONDS (ref 24–36)
AST SERPL W P-5'-P-CCNC: 17 U/L (ref 5–45)
BASE EXCESS BLDA CALC-SCNC: 0 MMOL/L (ref -2–3)
BASOPHILS # BLD AUTO: 0.04 THOUSANDS/ΜL (ref 0–0.1)
BASOPHILS NFR BLD AUTO: 0 % (ref 0–1)
BILIRUB DIRECT SERPL-MCNC: 0.3 MG/DL (ref 0–0.2)
BILIRUB SERPL-MCNC: 1.2 MG/DL (ref 0.2–1)
BUN BLD-MCNC: 9 MG/DL (ref 5–25)
BUN SERPL-MCNC: 9 MG/DL (ref 5–25)
CA-I BLD-SCNC: 1.11 MMOL/L (ref 1.12–1.32)
CA-I BLD-SCNC: 1.17 MMOL/L (ref 1.12–1.32)
CALCIUM SERPL-MCNC: 9.1 MG/DL (ref 8.3–10.1)
CHLORIDE BLD-SCNC: 95 MMOL/L (ref 100–108)
CHLORIDE SERPL-SCNC: 96 MMOL/L (ref 100–108)
CO2 SERPL-SCNC: 27 MMOL/L (ref 21–32)
CREAT BLD-MCNC: 0.6 MG/DL (ref 0.6–1.3)
CREAT SERPL-MCNC: 0.89 MG/DL (ref 0.6–1.3)
EOSINOPHIL # BLD AUTO: 0.18 THOUSAND/ΜL (ref 0–0.61)
EOSINOPHIL NFR BLD AUTO: 1 % (ref 0–6)
ERYTHROCYTE [DISTWIDTH] IN BLOOD BY AUTOMATED COUNT: 11.9 % (ref 11.6–15.1)
GFR SERPL CREATININE-BSD FRML MDRD: 117 ML/MIN/1.73SQ M
GFR SERPL CREATININE-BSD FRML MDRD: 83 ML/MIN/1.73SQ M
GLUCOSE SERPL-MCNC: 275 MG/DL (ref 65–140)
GLUCOSE SERPL-MCNC: 360 MG/DL (ref 65–140)
GLUCOSE SERPL-MCNC: 362 MG/DL (ref 65–140)
GLUCOSE SERPL-MCNC: 377 MG/DL (ref 65–140)
GLUCOSE SERPL-MCNC: 385 MG/DL (ref 65–140)
HCO3 BLDA-SCNC: 26.9 MMOL/L (ref 24–30)
HCT VFR BLD AUTO: 46 % (ref 34.8–46.1)
HCT VFR BLD CALC: 47 % (ref 34.8–46.1)
HCT VFR BLD CALC: 48 % (ref 34.8–46.1)
HGB BLD-MCNC: 15.7 G/DL (ref 11.5–15.4)
HGB BLDA-MCNC: 16 G/DL (ref 11.5–15.4)
HGB BLDA-MCNC: 16.3 G/DL (ref 11.5–15.4)
IMM GRANULOCYTES # BLD AUTO: 0.04 THOUSAND/UL (ref 0–0.2)
IMM GRANULOCYTES NFR BLD AUTO: 0 % (ref 0–2)
INR PPP: 1.05 (ref 0.86–1.17)
LACTATE SERPL-SCNC: 1.7 MMOL/L (ref 0.5–2)
LYMPHOCYTES # BLD AUTO: 3.31 THOUSANDS/ΜL (ref 0.6–4.47)
LYMPHOCYTES NFR BLD AUTO: 22 % (ref 14–44)
MCH RBC QN AUTO: 30.7 PG (ref 26.8–34.3)
MCHC RBC AUTO-ENTMCNC: 34.1 G/DL (ref 31.4–37.4)
MCV RBC AUTO: 90 FL (ref 82–98)
MONOCYTES # BLD AUTO: 0.78 THOUSAND/ΜL (ref 0.17–1.22)
MONOCYTES NFR BLD AUTO: 5 % (ref 4–12)
NEUTROPHILS # BLD AUTO: 10.7 THOUSANDS/ΜL (ref 1.85–7.62)
NEUTS SEG NFR BLD AUTO: 72 % (ref 43–75)
NRBC BLD AUTO-RTO: 0 /100 WBCS
PCO2 BLD: 25 MMOL/L (ref 21–32)
PCO2 BLD: 28 MMOL/L (ref 21–32)
PCO2 BLD: 50.2 MM HG (ref 42–50)
PH BLD: 7.34 [PH] (ref 7.3–7.4)
PLATELET # BLD AUTO: 221 THOUSANDS/UL (ref 149–390)
PMV BLD AUTO: 11.2 FL (ref 8.9–12.7)
PO2 BLD: 18 MM HG (ref 35–45)
POTASSIUM BLD-SCNC: 3.7 MMOL/L (ref 3.5–5.3)
POTASSIUM BLD-SCNC: 3.7 MMOL/L (ref 3.5–5.3)
POTASSIUM SERPL-SCNC: 3.7 MMOL/L (ref 3.5–5.3)
PROT SERPL-MCNC: 7.7 G/DL (ref 6.4–8.2)
PROTHROMBIN TIME: 13.4 SECONDS (ref 11.8–14.2)
RBC # BLD AUTO: 5.11 MILLION/UL (ref 3.81–5.12)
SAO2 % BLD FROM PO2: 24 % (ref 95–98)
SODIUM BLD-SCNC: 136 MMOL/L (ref 136–145)
SODIUM BLD-SCNC: 136 MMOL/L (ref 136–145)
SODIUM SERPL-SCNC: 133 MMOL/L (ref 136–145)
SPECIMEN SOURCE: ABNORMAL
SPECIMEN SOURCE: ABNORMAL
WBC # BLD AUTO: 15.05 THOUSAND/UL (ref 4.31–10.16)

## 2018-09-15 PROCEDURE — 85610 PROTHROMBIN TIME: CPT | Performed by: EMERGENCY MEDICINE

## 2018-09-15 PROCEDURE — 85730 THROMBOPLASTIN TIME PARTIAL: CPT | Performed by: EMERGENCY MEDICINE

## 2018-09-15 PROCEDURE — 96360 HYDRATION IV INFUSION INIT: CPT

## 2018-09-15 PROCEDURE — 85014 HEMATOCRIT: CPT

## 2018-09-15 PROCEDURE — 80047 BASIC METABLC PNL IONIZED CA: CPT

## 2018-09-15 PROCEDURE — 82947 ASSAY GLUCOSE BLOOD QUANT: CPT

## 2018-09-15 PROCEDURE — 85025 COMPLETE CBC W/AUTO DIFF WBC: CPT | Performed by: EMERGENCY MEDICINE

## 2018-09-15 PROCEDURE — 36415 COLL VENOUS BLD VENIPUNCTURE: CPT | Performed by: EMERGENCY MEDICINE

## 2018-09-15 PROCEDURE — 82009 KETONE BODYS QUAL: CPT | Performed by: EMERGENCY MEDICINE

## 2018-09-15 PROCEDURE — 80048 BASIC METABOLIC PNL TOTAL CA: CPT | Performed by: EMERGENCY MEDICINE

## 2018-09-15 PROCEDURE — 80076 HEPATIC FUNCTION PANEL: CPT | Performed by: EMERGENCY MEDICINE

## 2018-09-15 PROCEDURE — 84132 ASSAY OF SERUM POTASSIUM: CPT

## 2018-09-15 PROCEDURE — 84295 ASSAY OF SERUM SODIUM: CPT

## 2018-09-15 PROCEDURE — 82803 BLOOD GASES ANY COMBINATION: CPT

## 2018-09-15 PROCEDURE — 99284 EMERGENCY DEPT VISIT MOD MDM: CPT

## 2018-09-15 PROCEDURE — 82330 ASSAY OF CALCIUM: CPT

## 2018-09-15 PROCEDURE — 83605 ASSAY OF LACTIC ACID: CPT | Performed by: EMERGENCY MEDICINE

## 2018-09-15 RX ADMIN — SODIUM CHLORIDE 1000 ML: 0.9 INJECTION, SOLUTION INTRAVENOUS at 17:58

## 2018-09-15 RX ADMIN — SODIUM CHLORIDE 1000 ML: 0.9 INJECTION, SOLUTION INTRAVENOUS at 17:59

## 2018-09-15 NOTE — ED PROVIDER NOTES
History  Chief Complaint   Patient presents with    Hyperglycemia - Symptomatic     pt states " Im in DKA" BS greater than 400       History provided by:  Patient and parent  Vomiting   Severity:  Moderate  Duration:  6 hours  Timing:  Intermittent  Number of daily episodes:  4  Quality:  Bilious material  Able to tolerate:  Liquids  Progression:  Unchanged  Chronicity:  New  Recent urination:  Normal  Context comment:  Patient is diabetic, changed her insulin pump tubing, believes it was an air bubble in it she was not receiving insulin all day, started feeling nauseous, vomiting, sugars greater than 400 so she came in for evaluation  Relieved by:  Nothing  Worsened by:  Nothing  Ineffective treatments:  None tried  Associated symptoms: no abdominal pain, no chills, no cough, no diarrhea, no fever, no headaches, no sore throat and no URI    Risk factors: diabetes        Prior to Admission Medications   Prescriptions Last Dose Informant Patient Reported? Taking?    Ketone Blood Test STRP  Self Yes No   Sig: by In Vitro route   Urine Glucose-Ketones Test STRP   No No   Sig: Check urine ketones when blood sugar >300, if +ve call MD   glucagon (GLUCAGON EMERGENCY) 1 MG injection  Self Yes No   Sig: Inject as directed   hydrochlorothiazide (HYDRODIURIL) 12 5 mg tablet   No No   Sig: Take 1 tablet (12 5 mg total) by mouth daily   insulin aspart (NOVOLOG FLEXPEN) 100 Units/mL SOPN  Self Yes No   Sig: Inject under the skin   levocetirizine (XYZAL) 5 MG tablet   Yes No   Sig: Take 5 mg by mouth daily   levonorgestrel (MIRENA) 20 MCG/24HR IUD  Self Yes No   Si mcg/day   pantoprazole (PROTONIX) 40 mg tablet   Yes No   Sig: TAKE 1 TABLET IN THE MORNING 30 MINUTES BEFORE BREAKFAST      Facility-Administered Medications: None       Past Medical History:   Diagnosis Date    Celiac disease     Encounter for smoking cessation counseling     Hypertension     Type 1 diabetes ([facility] [address] )        Past Surgical History:   Procedure Laterality Date    COLONOSCOPY         Family History   Problem Relation Age of Onset    Hypertension Mother     Hypertension Father     Melanoma Paternal Aunt     Skin cancer Family     Pancreatic cancer Maternal Grandfather     Stroke Paternal Grandfather      I have reviewed and agree with the history as documented  Social History   Substance Use Topics    Smoking status: Current Every Day Smoker    Smokeless tobacco: Never Used    Alcohol use Yes      Comment: occasionally        Review of Systems   Constitutional: Negative for activity change, chills, diaphoresis and fever  HENT: Negative for congestion, sinus pressure and sore throat  Eyes: Negative for pain and visual disturbance  Respiratory: Negative for cough, chest tightness, shortness of breath, wheezing and stridor  Cardiovascular: Negative for chest pain and palpitations  Gastrointestinal: Positive for vomiting  Negative for abdominal distention, abdominal pain, constipation, diarrhea and nausea  Genitourinary: Negative for dysuria and frequency  Musculoskeletal: Negative for neck pain and neck stiffness  Skin: Negative for rash  Neurological: Negative for dizziness, speech difficulty, light-headedness, numbness and headaches  Physical Exam  Physical Exam   Constitutional: She is oriented to person, place, and time  She appears well-developed  She appears distressed  HENT:   Head: Normocephalic and atraumatic  Eyes: Pupils are equal, round, and reactive to light  Neck: Normal range of motion  Neck supple  No tracheal deviation present  Cardiovascular: Normal rate, regular rhythm, normal heart sounds and intact distal pulses  No murmur heard  Pulmonary/Chest: Effort normal and breath sounds normal  No stridor  No respiratory distress  Abdominal: Soft  She exhibits no distension  There is no tenderness  There is no rebound and no guarding  Musculoskeletal: Normal range of motion     Neurological: She is alert and oriented to person, place, and time  Skin: Skin is warm and dry  She is not diaphoretic  No erythema  No pallor  Psychiatric: She has a normal mood and affect  Vitals reviewed  Vital Signs  ED Triage Vitals [09/15/18 1734]   Temperature Pulse Respirations Blood Pressure SpO2   97 8 °F (36 6 °C) 96 16 121/71 99 %      Temp Source Heart Rate Source Patient Position - Orthostatic VS BP Location FiO2 (%)   Oral Monitor Sitting Left arm --      Pain Score       Worst Possible Pain           Vitals:    09/15/18 1734   BP: 121/71   Pulse: 96   Patient Position - Orthostatic VS: Sitting       Visual Acuity      ED Medications  Medications   sodium chloride 0 9 % bolus 1,000 mL (1,000 mL Intravenous New Bag 9/15/18 1758)   sodium chloride 0 9 % bolus 1,000 mL (1,000 mL Intravenous New Bag 9/15/18 1759)       Diagnostic Studies  Results Reviewed     Procedure Component Value Units Date/Time    Fingerstick Glucose (POCT) [[id number]]  (Abnormal) Collected:  09/15/18 1845    Lab Status:  Final result Updated:  09/15/18 1846     POC Glucose 275 (H) mg/dl     Lactic acid, plasma [[id number]]  (Normal) Collected:  09/15/18 1752    Lab Status:  Final result Specimen:  Blood from Arm, Left Updated:  09/15/18 1820     LACTIC ACID 1 7 mmol/L     Narrative:         Result may be elevated if tourniquet was used during collection      Basic metabolic panel [[id number]]  (Abnormal) Collected:  09/15/18 1752    Lab Status:  Final result Specimen:  Blood from Arm, Left Updated:  09/15/18 1817     Sodium 133 (L) mmol/L      Potassium 3 7 mmol/L      Chloride 96 (L) mmol/L      CO2 27 mmol/L      ANION GAP 10 mmol/L      BUN 9 mg/dL      Creatinine 0 89 mg/dL      Glucose 385 (H) mg/dL      Calcium 9 1 mg/dL      eGFR 83 ml/min/1 73sq m     Narrative:         National Kidney Disease Education Program recommendations are as follows:  GFR calculation is accurate only with a steady state creatinine  Chronic Kidney disease less than 60 ml/min/1 73 sq  meters  Kidney failure less than 15 ml/min/1 73 sq  meters      Hepatic function panel [[id number]]  (Abnormal) Collected:  09/15/18 1752    Lab Status:  Final result Specimen:  Blood from Arm, Left Updated:  09/15/18 1817     Total Bilirubin 1 20 (H) mg/dL      Bilirubin, Direct 0 30 (H) mg/dL      Alkaline Phosphatase 56 U/L      AST 17 U/L      ALT 26 U/L      Total Protein 7 7 g/dL      Albumin 4 6 g/dL     Acetone [[id number]]  (Abnormal) Collected:  09/15/18 1752    Lab Status:  Final result Specimen:  Blood from Arm, Left Updated:  09/15/18 1816     Acetone, Bld 1+ (A)    Protime-INR [[id number]]  (Normal) Collected:  09/15/18 1752    Lab Status:  Final result Specimen:  Blood from Arm, Left Updated:  09/15/18 1808     Protime 13 4 seconds      INR 1 05    APTT [[id number]]  (Normal) Collected:  09/15/18 1752    Lab Status:  Final result Specimen:  Blood from Arm, Left Updated:  09/15/18 1808     PTT 24 seconds     POCT Blood Gas (CG8+) [[id number]]  (Abnormal) Collected:  09/15/18 1753    Lab Status:  Final result Updated:  09/15/18 1800     ph, Alvarez ISTAT 7 338     pCO2, Alvarez i-STAT 50 2 (H) mm HG      pO2, Alvarez i-STAT 18 0 (L) mm HG      BE, i-STAT 0 mmol/L      HCO3, Alvarez i-STAT 26 9 mmol/L      CO2, i-STAT 28 mmol/L      O2 Sat, i-STAT 24 (L) %      SODIUM, I-STAT 136 mmol/l      Potassium, i-STAT 3 7 mmol/L      Calcium, Ionized i-STAT 1 17 mmol/L      Hct, i-STAT 47 (H) %      Hgb, i-STAT 16 0 (H) g/dl      Glucose, i-STAT 377 (H) mg/dl      Specimen Type VENOUS    CBC and differential [[id number]]  (Abnormal) Collected:  09/15/18 1752    Lab Status:  Final result Specimen:  Blood from Arm, Left Updated:  09/15/18 1800     WBC 15 05 (H) Thousand/uL      RBC 5 11 Million/uL      Hemoglobin 15 7 (H) g/dL      Hematocrit 46 0 %      MCV 90 fL      MCH 30 7 pg      MCHC 34 1 g/dL      RDW 11 9 %      MPV 11 2 fL      Platelets 452 Thousands/uL      nRBC 0 /100 WBCs      Neutrophils Relative 72 % Immat GRANS % 0 %      Lymphocytes Relative 22 %      Monocytes Relative 5 %      Eosinophils Relative 1 %      Basophils Relative 0 %      Neutrophils Absolute 10 70 (H) Thousands/µL      Immature Grans Absolute 0 04 Thousand/uL      Lymphocytes Absolute 3 31 Thousands/µL      Monocytes Absolute 0 78 Thousand/µL      Eosinophils Absolute 0 18 Thousand/µL      Basophils Absolute 0 04 Thousands/µL     POCT Chem 8+ [[id number]]  (Abnormal) Collected:  09/15/18 1752    Lab Status:  Final result Updated:  09/15/18 1800     SODIUM, I-STAT 136 mmol/l      Potassium, i-STAT 3 7 mmol/L      Chloride, istat 95 (L) mmol/L      CO2, i-STAT 25 mmol/L      Anion Gap, Istat 21 (H) mmol/L      Calcium, Ionized i-STAT 1 11 (L) mmol/L      BUN, I-STAT 9 mg/dl      Creatinine, i-STAT 0 6 mg/dl      eGFR 117 ml/min/1 73sq m      Glucose, i-STAT 360 (H) mg/dl      Hct, i-STAT 48 (H) %      Hgb, i-STAT 16 3 (H) g/dl      Specimen Type VENOUS    Fingerstick Glucose (POCT) [[id number]]  (Abnormal) Collected:  09/15/18 1746    Lab Status:  Final result Updated:  09/15/18 1747     POC Glucose 362 (H) mg/dl                  No orders to display              Procedures  Procedures       Phone Contacts  ED Phone Contact    ED Course  ED Course as of Sep 15 1853   Sat Sep 15, 2018   1852 Sugar coming down with IV fluids, ketones +1, not acidotic, will discharge patient agrees                                MDM  Number of Diagnoses or Management Options  Ketosis due to diabetes [facility]): new and requires workup  Type 1 diabetes mellitus with hyperglycemia, with long-term current use of insulin [facility]): new and requires workup  Diagnosis management comments:       Initial ED assessment:  78-year-old female, history of diabetes believe she would not receive any insulin all day, now feeling weak, "awful" and multiple episodes of vomiting    Initial DDx includes but is not limited to:     DKA, hyperglycemia, ketosis    Initial ED plan:     Blood work, IV fluids, disposition pending ED workup            Amount and/or Complexity of Data Reviewed  Clinical lab tests: ordered and reviewed  Decide to obtain previous medical records or to obtain history from someone other than the patient: yes  Obtain history from someone other than the patient: yes  Review and summarize past medical records: yes  Independent visualization of images, tracings, or specimens: yes      CritCare Time    Disposition  Final diagnoses:   Ketosis due to diabetes ([facility] )   Type 1 diabetes mellitus with hyperglycemia, with long-term current use of insulin ([facility] )     Time reflects when diagnosis was documented in both MDM as applicable and the Disposition within this note     Time User Action Codes Description Comment    9/15/2018  6:52 PM Maryland Schwab Add [E13 10] Ketosis due to diabetes ([facility] )     9/15/2018  6:52 PM Eunice Calderon, [address] [place] [address] [E10 65] Type 1 diabetes mellitus with hyperglycemia, with long-term current use of insulin [facility])       ED Disposition     ED Disposition Condition Comment    Discharge  Bertha Bell discharge to home/self care  Condition at discharge: Good        Follow-up Information    None         Patient's Medications   Discharge Prescriptions    No medications on file     No discharge procedures on file      ED Provider  Electronically Signed by           Milan Peck DO  09/15/18 2666

## 2018-09-15 NOTE — DISCHARGE INSTRUCTIONS
Diabetic Hyperglycemia, Ambulatory Care   GENERAL INFORMATION:   Diabetic hyperglycemia  is a blood glucose (sugar) level that is higher than your healthcare provider recommends  You may not have any signs and symptoms  You may have increased thirst and urinate more often than usual   Seek immediate care for the following symptoms:   · Shortness of breath    · Breath that smells fruity    · Nausea and vomiting    · Symptoms of dehydration, such as dark yellow urine, dry mouth and lips, and dry skin  Manage diabetic hyperglycemia:   · If you take diabetes medicine or insulin, take it as directed  Missed or wrong doses can cause your blood sugar to go up  · Tell your healthcare provider if you continue to have trouble managing your blood sugar  He may change the type, amount, or timing of your diabetes medicine or insulin  If you do not take diabetes medicine or insulin, you may need to start  · Work with your healthcare provider to develop a sick day plan  Illness can cause your blood sugar to rise  A sick day plan helps you control your blood sugar level when you are sick  Prevent diabetic hyperglycemia:   · Check your blood sugar levels regularly  Ask your healthcare provider how often to check your blood sugar and what your levels should be  · Follow your meal plan  Your blood sugar can go up if you eat a large meal or you eat more carbohydrates than recommended  Work with a dietitian to develop a meal plan that is right for you  · Exercise  can help lower your blood sugar when it is high  It can also keep your blood sugar levels steady over time  Exercise for at least 30 minutes, 5 days a week  Include muscle strengthening activities 2 days each week  Work with your healthcare provider to create an exercise plan  Children should get at least 60 minutes of physical activity each day  · Check your ketones before exercise  if your blood sugar level is above 240 mg/dl   Do not exercise if you have ketones in your urine, because your blood sugar level may rise even more  Ask your healthcare provider how to lower your blood sugar when you have ketones  Follow up with your healthcare provider as directed:  Write down your questions so you remember to ask them during your visits  CARE AGREEMENT:   You have the right to help plan your care  Learn about your health condition and how it may be treated  Discuss treatment options with your caregivers to decide what care you want to receive  You always have the right to refuse treatment  The above information is an  only  It is not intended as medical advice for individual conditions or treatments  Talk to your doctor, nurse or pharmacist before following any medical regimen to see if it is safe and effective for you  © 2014 [address] is for End User's use only and may not be sold, redistributed or otherwise used for commercial purposes  All illustrations and images included in CareNotes® are the copyrighted property of A D A M , Inc  or Malick Gerardo

## 2018-09-20 DIAGNOSIS — R42 DIZZINESS: ICD-10-CM

## 2018-09-20 RX ORDER — HYDROCHLOROTHIAZIDE 12.5 MG/1
TABLET ORAL
Qty: 90 TABLET | Refills: 0 | OUTPATIENT
Start: 2018-09-20

## 2018-09-28 ENCOUNTER — TELEPHONE (OUTPATIENT)
Dept: FAMILY MEDICINE CLINIC | Facility: MEDICAL CENTER | Age: 38
End: 2018-09-28

## 2018-09-28 NOTE — TELEPHONE ENCOUNTER
Patient dropped off an employment form but she has not had a recent lipid panel to fill in the values  LMOM for her to return our call, form is in the nurses bin up front

## 2018-09-28 NOTE — TELEPHONE ENCOUNTER
Patient said she spoke with her HR and they stated that the Lipid panel from last year and the more recent labs are fine for the form  Filled out, in your bin for signature, she needs this on Monday

## 2018-10-01 LAB
25(OH)D3 SERPL-MCNC: 48 NG/ML (ref 30–100)
ACE SERPL-CCNC: 59 U/L (ref 9–67)
ALBUMIN SERPL ELPH-MCNC: 4.6 G/DL (ref 3.8–4.8)
ALBUMIN SERPL-MCNC: 4.5 G/DL (ref 3.6–5.1)
ALBUMIN/GLOB SERPL: 1.8 (CALC) (ref 1–2.5)
ALDOLASE SERPL-CCNC: 4.2 U/L
ALP SERPL-CCNC: 51 U/L (ref 33–115)
ALPHA1 GLOB SERPL ELPH-MCNC: 0.3 G/DL (ref 0.2–0.3)
ALPHA2 GLOB SERPL ELPH-MCNC: 0.7 G/DL (ref 0.5–0.9)
ALT SERPL-CCNC: 10 U/L (ref 6–29)
ANA SER QL IF: NEGATIVE
AST SERPL-CCNC: 13 U/L (ref 10–30)
B BURGDOR AB SER IA-ACNC: <0.9 INDEX
BASOPHILS # BLD AUTO: 47 CELLS/UL (ref 0–200)
BASOPHILS NFR BLD AUTO: 0.7 %
BETA1 GLOB SERPL ELPH-MCNC: 0.4 G/DL (ref 0.4–0.6)
BETA2 GLOB SERPL ELPH-MCNC: 0.3 G/DL (ref 0.2–0.5)
BILIRUB SERPL-MCNC: 0.5 MG/DL (ref 0.2–1.2)
BUN SERPL-MCNC: 11 MG/DL (ref 7–25)
BUN/CREAT SERPL: ABNORMAL (CALC) (ref 6–22)
CALCIUM SERPL-MCNC: 9.5 MG/DL (ref 8.6–10.2)
CENTROMERE B AB SER-ACNC: NORMAL AI
CHLORIDE SERPL-SCNC: 102 MMOL/L (ref 98–110)
CK SERPL-CCNC: 53 U/L (ref 29–143)
CO2 SERPL-SCNC: 33 MMOL/L (ref 20–32)
CREAT SERPL-MCNC: 0.68 MG/DL (ref 0.5–1.1)
CRP SERPL-MCNC: 0.6 MG/L
DSDNA AB SER-ACNC: 1 IU/ML
ENA SCL70 AB SER IA-ACNC: NORMAL AI
ENA SM AB SER IA-ACNC: NORMAL AI
ENA SM+RNP AB SER IA-ACNC: NORMAL AI
ENA SS-A AB SER IA-ACNC: NORMAL AI
ENA SS-B AB SER IA-ACNC: NORMAL AI
EOSINOPHIL # BLD AUTO: 188 CELLS/UL (ref 15–500)
EOSINOPHIL NFR BLD AUTO: 2.8 %
ERYTHROCYTE [DISTWIDTH] IN BLOOD BY AUTOMATED COUNT: 12.1 % (ref 11–15)
ERYTHROCYTE [SEDIMENTATION RATE] IN BLOOD BY WESTERGREN METHOD: 2 MM/H
FOLATE SERPL-MCNC: 9.3 NG/ML
GAMMA GLOB SERPL ELPH-MCNC: 0.8 G/DL (ref 0.8–1.7)
GLOBULIN SER CALC-MCNC: 2.5 G/DL (CALC) (ref 1.9–3.7)
GLUCOSE SERPL-MCNC: 189 MG/DL (ref 65–99)
HCT VFR BLD AUTO: 42.1 % (ref 35–45)
HCV AB S/CO SERPL IA: 0.01
HCV AB SERPL QL IA: NORMAL
HGB BLD-MCNC: 14 G/DL (ref 11.7–15.5)
INTERPRETATION SERPL IFE-IMP: NORMAL
LYMPHOCYTES # BLD AUTO: 2633 CELLS/UL (ref 850–3900)
LYMPHOCYTES NFR BLD AUTO: 39.3 %
MCH RBC QN AUTO: 30.2 PG (ref 27–33)
MCHC RBC AUTO-ENTMCNC: 33.3 G/DL (ref 32–36)
MCV RBC AUTO: 90.9 FL (ref 80–100)
MONOCYTES # BLD AUTO: 509 CELLS/UL (ref 200–950)
MONOCYTES NFR BLD AUTO: 7.6 %
NEUTROPHILS # BLD AUTO: 3323 CELLS/UL (ref 1500–7800)
NEUTROPHILS NFR BLD AUTO: 49.6 %
PLATELET # BLD AUTO: 176 THOUSAND/UL (ref 140–400)
PMV BLD REES-ECKER: 12.4 FL (ref 7.5–12.5)
POTASSIUM SERPL-SCNC: 4.1 MMOL/L (ref 3.5–5.3)
PROT PATTERN SERPL ELPH-IMP: NORMAL
PROT SERPL-MCNC: 7 G/DL (ref 6.1–8.1)
PROT SERPL-MCNC: 7 G/DL (ref 6.1–8.1)
RBC # BLD AUTO: 4.63 MILLION/UL (ref 3.8–5.1)
SL AMB EGFR AFRICAN AMERICAN: 130 ML/MIN/1.73M2
SL AMB EGFR NON AFRICAN AMERICAN: 112 ML/MIN/1.73M2
SODIUM SERPL-SCNC: 139 MMOL/L (ref 135–146)
THYROGLOB AB SERPL-ACNC: <1 IU/ML
THYROPEROXIDASE AB SERPL-ACNC: <1 IU/ML
TSH SERPL-ACNC: 1.86 MIU/L
VIT B12 SERPL-MCNC: 682 PG/ML (ref 200–1100)
WBC # BLD AUTO: 6.7 THOUSAND/UL (ref 3.8–10.8)

## 2018-10-20 DIAGNOSIS — E10.8 TYPE 1 DIABETES MELLITUS WITH COMPLICATION (HCC): ICD-10-CM

## 2019-08-01 ENCOUNTER — OFFICE VISIT (OUTPATIENT)
Dept: FAMILY MEDICINE CLINIC | Facility: MEDICAL CENTER | Age: 39
End: 2019-08-01
Payer: COMMERCIAL

## 2019-08-01 VITALS
HEIGHT: 61 IN | HEART RATE: 80 BPM | SYSTOLIC BLOOD PRESSURE: 116 MMHG | RESPIRATION RATE: 14 BRPM | BODY MASS INDEX: 26.65 KG/M2 | WEIGHT: 141.13 LBS | DIASTOLIC BLOOD PRESSURE: 72 MMHG

## 2019-08-01 DIAGNOSIS — E10.65 TYPE 1 DIABETES MELLITUS WITH HYPERGLYCEMIA, WITH LONG-TERM CURRENT USE OF INSULIN (HCC): ICD-10-CM

## 2019-08-01 DIAGNOSIS — F41.9 ANXIETY: ICD-10-CM

## 2019-08-01 DIAGNOSIS — Z00.00 ROUTINE ADULT HEALTH MAINTENANCE: Primary | ICD-10-CM

## 2019-08-01 PROCEDURE — 99395 PREV VISIT EST AGE 18-39: CPT | Performed by: FAMILY MEDICINE

## 2019-08-01 RX ORDER — CHOLECALCIFEROL (VITAMIN D3) 1250 MCG
5000 CAPSULE ORAL DAILY
COMMUNITY
Start: 2019-02-07 | End: 2021-08-16 | Stop reason: DRUGHIGH

## 2019-08-01 RX ORDER — RANITIDINE 300 MG/1
300 TABLET ORAL
COMMUNITY
End: 2020-05-06 | Stop reason: ALTCHOICE

## 2019-08-01 NOTE — PROGRESS NOTES
Magda Williamosn is here for CPX  HH: Quit smoking Sept 2018  She says she gained weight as a result  FH: Father with CAD at age 72  Cancer mat GF prostate or pancreatic cancer  Aunt melanoma  Charleen Martinez was under lot of stress last year with multiple medical issues  She had a LEEP procedure per Gyn for cervical dysplasia  Seeing dermatologist  [facility]  Has cystic acne  Has tried multiple antibioitics but caused chronic infections  May be getting Accutane  She had an EGD for follow-up of her celiac disease  She switched endocrinologist to [address] [place] [address]   She is very happy with him  However she gained 20 lb over the past year likely due to quitting smoking as well as better control with insulin  Her A1c unfortunately is not much better at 9 0  Changes were made in her basal insulin   She resumed using her pump  O: /72   Pulse 80   Resp 14   Ht 5' 1" (1 549 m)   Wt 64 kg (141 lb 2 oz)   BMI 26 67 kg/m²   Physical Exam   Constitutional: She is oriented to person, place, and time  She appears well-developed and well-nourished  HENT:   Head: Normocephalic  Right Ear: External ear normal    Left Ear: External ear normal    Nose: Nose normal    Mouth/Throat: Oropharynx is clear and moist    Eyes: Pupils are equal, round, and reactive to light  Conjunctivae and EOM are normal  No scleral icterus  Neck: Normal range of motion  Neck supple  Carotid bruit is not present  No thyroid mass and no thyromegaly present  Cardiovascular: Normal rate, regular rhythm, normal heart sounds and intact distal pulses  Pulses:       Femoral pulses are 2+ on the right side, and 2+ on the left side  Popliteal pulses are 2+ on the right side, and 2+ on the left side  Dorsalis pedis pulses are 2+ on the right side, and 2+ on the left side  Posterior tibial pulses are 2+ on the right side, and 2+ on the left side     Pulmonary/Chest: Effort normal and breath sounds normal  No respiratory distress  She has no wheezes  She has no rales  Abdominal: Soft  Normal aorta and bowel sounds are normal  She exhibits no distension and no mass  There is no hepatosplenomegaly  There is no tenderness  Musculoskeletal: Normal range of motion  She exhibits no edema  Lymphadenopathy:        Head (right side): No submandibular and no occipital adenopathy present  Head (left side): No submandibular and no occipital adenopathy present  She has no cervical adenopathy  Right: No inguinal and no supraclavicular adenopathy present  Left: No inguinal and no supraclavicular adenopathy present  Neurological: She is oriented to person, place, and time  Skin: No lesion and no rash noted  Psychiatric: She has a normal mood and affect  Her behavior is normal      Assessment  1  Health maintenance-up-to-date  Pneumonia shots would be indicated  2  IDDM-per Endocrinology  Had a long discussion regarding complications of diabetes and the importance of good control  Also emphasized the importance of exercise and adherence to diet  3  Former cigarette smoker-congratulated on her cessation for almost a year  Hopefully she can lose some of the weight that she gained through the above  4  Anxiety-improved  We have used SSRIs and anxiolytics on her in the past   If symptoms recur may consider p r n  Xanax with appropriate use  5  Celiac disease-her GI    Plan    As above  Recheck 1 year

## 2019-10-03 ENCOUNTER — TELEPHONE (OUTPATIENT)
Dept: FAMILY MEDICINE CLINIC | Facility: MEDICAL CENTER | Age: 39
End: 2019-10-03

## 2019-10-03 ENCOUNTER — OFFICE VISIT (OUTPATIENT)
Dept: FAMILY MEDICINE CLINIC | Facility: MEDICAL CENTER | Age: 39
End: 2019-10-03
Payer: COMMERCIAL

## 2019-10-03 VITALS
TEMPERATURE: 98.1 F | OXYGEN SATURATION: 98 % | DIASTOLIC BLOOD PRESSURE: 70 MMHG | HEART RATE: 78 BPM | SYSTOLIC BLOOD PRESSURE: 110 MMHG

## 2019-10-03 DIAGNOSIS — J01.90 ACUTE NON-RECURRENT SINUSITIS, UNSPECIFIED LOCATION: Primary | ICD-10-CM

## 2019-10-03 PROCEDURE — 99213 OFFICE O/P EST LOW 20 MIN: CPT | Performed by: FAMILY MEDICINE

## 2019-10-03 RX ORDER — AMOXICILLIN AND CLAVULANATE POTASSIUM 875; 125 MG/1; MG/1
1 TABLET, FILM COATED ORAL EVERY 12 HOURS SCHEDULED
Qty: 14 TABLET | Refills: 0 | Status: SHIPPED | OUTPATIENT
Start: 2019-10-03 | End: 2019-10-10

## 2019-10-03 RX ORDER — FLUCONAZOLE 150 MG/1
TABLET ORAL AS NEEDED
Refills: 3 | COMMUNITY
Start: 2019-09-13 | End: 2021-09-06 | Stop reason: HOSPADM

## 2019-10-03 NOTE — TELEPHONE ENCOUNTER
Mom has same sx now that Richard Moraes has, doing otc, but getting worse, has green nasal dc, and cough    She was wondering if you could send something over being Cb Hankins was just in w/same sx?  [facility]    See also msg on [facility]

## 2019-10-03 NOTE — PROGRESS NOTES
Ally Barbosa started with a sore throat  Now with nasal congestion and headache  Today she had a productive cough   Green mucous  No chest tightness  No further sore throat  Took Tylenol sinus  She says her diabetes has been uncontrolled    O: /70 (Cuff Size: Standard)   Pulse 78   Temp 98 1 °F (36 7 °C)   SpO2 98%   HEENT-TM's normal Pharynx without erythema  Eyes clear  Sinuses nontender  Neck no adenopathy  Chest clear  Cardiac regular rate without murmur    Assessment  Upper respiratory infection    Plan  Advised Tylenol Sinus as needed  Over-the-counter cough medicine  Given a written prescription for Augmentin should symptoms worsen with fever or facial pain over the next few days

## 2019-10-03 NOTE — TELEPHONE ENCOUNTER
Abdifatah Hodge says she is starting exactly as her daughter started  She is now coughing up green mucous as of this morning  She has not taken anything OTC because of her Diabetes, she does not have a fever, very congested  Please advise?  See recent message concerning her daughter as well this am

## 2020-01-23 DIAGNOSIS — E10.8 TYPE 1 DIABETES MELLITUS WITH COMPLICATION (HCC): ICD-10-CM

## 2020-02-06 DIAGNOSIS — H66.002 NON-RECURRENT ACUTE SUPPURATIVE OTITIS MEDIA OF LEFT EAR WITHOUT SPONTANEOUS RUPTURE OF TYMPANIC MEMBRANE: Primary | ICD-10-CM

## 2020-02-06 RX ORDER — AMOXICILLIN AND CLAVULANATE POTASSIUM 875; 125 MG/1; MG/1
1 TABLET, FILM COATED ORAL EVERY 12 HOURS SCHEDULED
Qty: 20 TABLET | Refills: 0 | Status: SHIPPED | OUTPATIENT
Start: 2020-02-06 | End: 2020-02-16

## 2020-02-27 ENCOUNTER — TELEPHONE (OUTPATIENT)
Dept: FAMILY MEDICINE CLINIC | Facility: MEDICAL CENTER | Age: 40
End: 2020-02-27

## 2020-02-27 NOTE — TELEPHONE ENCOUNTER
I spoke with Ayla Ly and her dermatologist started her on a new medication approved by her gastro about 2 weeks ago  One of the side effects from the med is causing her to have panic attacks the last couple of days which she was aware that it could happen  She said she mentioned her wanting the Xanax when she was here with one of her kids recently  She is aware I cannot call a controlled substance in and you are out of town currently  She will deal with it until Monday and then would like for you to address at that time

## 2020-02-27 NOTE — TELEPHONE ENCOUNTER
Message out to Dr Janene Raphael  I asked her to let us know what she recommends  We aren't able to call this in anymore, it is a controlled substance  I asked Dr Janene Raphael to advise  We can check back later to confirm  I am keep the message in our bin as well as Dr Noa Ellsworth

## 2020-02-27 NOTE — TELEPHONE ENCOUNTER
Pt said she discussed restarting Xanax at her last visit  She is currently having a panic attach and would like an RX  She doesn't feel that she can wait until Monday  Request we try to get an approval from Dr Jonna Dejesus to call in

## 2020-03-02 NOTE — TELEPHONE ENCOUNTER
Farrah Solorio sees  Dermatology- Dr Bernabe Zhou office  This medication was the last resort  Deals with severe Acne  Breaks outs cause her to bleed etc  The Acne itself was causing depression  The Dermatologist did go over all the side effects and this was one of them   She also discussed it with the Therapist  Has f/u appt with the Derm Friday

## 2020-03-03 DIAGNOSIS — F41.9 ANXIETY: Primary | ICD-10-CM

## 2020-03-03 RX ORDER — ALPRAZOLAM 0.25 MG/1
0.25 TABLET ORAL 2 TIMES DAILY PRN
Qty: 30 TABLET | Refills: 0 | Status: SHIPPED | OUTPATIENT
Start: 2020-03-03 | End: 2021-08-16 | Stop reason: ALTCHOICE

## 2020-03-03 NOTE — TELEPHONE ENCOUNTER
We can give her prescription for Xanax however she cannot use it more than twice a week  She may want to consider being on an anxiety medication that she takes daily similar to what she took in the past   Check records or ask patient;  I believe it was Zoloft

## 2020-03-03 NOTE — TELEPHONE ENCOUNTER
Wishes to just do prn Xanax   Please send to [address]   States she doesn't do well with the side effects when on a maintenance medication

## 2020-03-17 DIAGNOSIS — E10.8 TYPE 1 DIABETES MELLITUS WITH COMPLICATION (HCC): ICD-10-CM

## 2020-03-19 DIAGNOSIS — E10.8 TYPE 1 DIABETES MELLITUS WITH COMPLICATION (HCC): ICD-10-CM

## 2020-03-19 NOTE — TELEPHONE ENCOUNTER
Pt has not seen us since 2018, pharmacy keeps sending RX to me   Please inform pharmacy pt will need appt with us first, I can send only one month supply ( emergency) please also inform pt     Thanks   Britney Grant MD

## 2020-05-05 ENCOUNTER — TELEPHONE (OUTPATIENT)
Dept: FAMILY MEDICINE CLINIC | Facility: MEDICAL CENTER | Age: 40
End: 2020-05-05

## 2020-05-06 ENCOUNTER — OFFICE VISIT (OUTPATIENT)
Dept: FAMILY MEDICINE CLINIC | Facility: MEDICAL CENTER | Age: 40
End: 2020-05-06
Payer: COMMERCIAL

## 2020-05-06 VITALS
DIASTOLIC BLOOD PRESSURE: 70 MMHG | WEIGHT: 144 LBS | HEIGHT: 61 IN | SYSTOLIC BLOOD PRESSURE: 110 MMHG | HEART RATE: 76 BPM | RESPIRATION RATE: 16 BRPM | BODY MASS INDEX: 27.19 KG/M2

## 2020-05-06 DIAGNOSIS — H66.90 EAR INFECTION: Primary | ICD-10-CM

## 2020-05-06 DIAGNOSIS — H92.11 OTORRHEA OF RIGHT EAR: ICD-10-CM

## 2020-05-06 DIAGNOSIS — H60.61 CHRONIC OTITIS EXTERNA OF RIGHT EAR, UNSPECIFIED TYPE: ICD-10-CM

## 2020-05-06 PROCEDURE — 99213 OFFICE O/P EST LOW 20 MIN: CPT | Performed by: FAMILY MEDICINE

## 2020-05-06 PROCEDURE — 87077 CULTURE AEROBIC IDENTIFY: CPT | Performed by: FAMILY MEDICINE

## 2020-05-06 PROCEDURE — 1036F TOBACCO NON-USER: CPT | Performed by: FAMILY MEDICINE

## 2020-05-06 PROCEDURE — 87186 SC STD MICRODIL/AGAR DIL: CPT | Performed by: FAMILY MEDICINE

## 2020-05-06 PROCEDURE — 87070 CULTURE OTHR SPECIMN AEROBIC: CPT | Performed by: FAMILY MEDICINE

## 2020-05-06 PROCEDURE — 87147 CULTURE TYPE IMMUNOLOGIC: CPT | Performed by: FAMILY MEDICINE

## 2020-05-06 PROCEDURE — 87205 SMEAR GRAM STAIN: CPT | Performed by: FAMILY MEDICINE

## 2020-05-06 PROCEDURE — 3008F BODY MASS INDEX DOCD: CPT | Performed by: FAMILY MEDICINE

## 2020-05-06 RX ORDER — NYSTATIN 100000 U/G
OINTMENT TOPICAL AS NEEDED
COMMUNITY
Start: 2020-03-06 | End: 2021-08-16 | Stop reason: ALTCHOICE

## 2020-05-06 RX ORDER — BLOOD SUGAR DIAGNOSTIC
STRIP MISCELLANEOUS
COMMUNITY
Start: 2020-03-23

## 2020-05-06 RX ORDER — NEOMYCIN SULFATE, POLYMYXIN B SULFATE AND HYDROCORTISONE 10; 3.5; 1 MG/ML; MG/ML; [USP'U]/ML
4 SUSPENSION/ DROPS AURICULAR (OTIC) EVERY 6 HOURS SCHEDULED
Qty: 10 ML | Refills: 1 | Status: SHIPPED | OUTPATIENT
Start: 2020-05-06 | End: 2021-08-16 | Stop reason: ALTCHOICE

## 2020-05-06 RX ORDER — ISOTRETINOIN 30 MG/1
60 CAPSULE ORAL DAILY
COMMUNITY
Start: 2020-04-09 | End: 2021-08-16 | Stop reason: ALTCHOICE

## 2020-05-06 RX ORDER — CLOBETASOL PROPIONATE 0.5 MG/G
CREAM TOPICAL
COMMUNITY
Start: 2020-04-09 | End: 2021-09-06 | Stop reason: HOSPADM

## 2020-05-09 LAB
BACTERIA EAR AEROBE CULT: ABNORMAL
GRAM STN SPEC: ABNORMAL

## 2020-05-14 ENCOUNTER — TELEPHONE (OUTPATIENT)
Dept: FAMILY MEDICINE CLINIC | Facility: MEDICAL CENTER | Age: 40
End: 2020-05-14

## 2020-05-14 DIAGNOSIS — H60.61 CHRONIC OTITIS EXTERNA OF RIGHT EAR, UNSPECIFIED TYPE: Primary | ICD-10-CM

## 2020-05-14 RX ORDER — SULFAMETHOXAZOLE AND TRIMETHOPRIM 800; 160 MG/1; MG/1
1 TABLET ORAL EVERY 12 HOURS SCHEDULED
Qty: 14 TABLET | Refills: 0 | Status: SHIPPED | OUTPATIENT
Start: 2020-05-14 | End: 2020-05-21

## 2020-05-27 ENCOUNTER — OFFICE VISIT (OUTPATIENT)
Dept: FAMILY MEDICINE CLINIC | Facility: MEDICAL CENTER | Age: 40
End: 2020-05-27
Payer: COMMERCIAL

## 2020-05-27 VITALS
BODY MASS INDEX: 25.49 KG/M2 | SYSTOLIC BLOOD PRESSURE: 118 MMHG | DIASTOLIC BLOOD PRESSURE: 70 MMHG | RESPIRATION RATE: 14 BRPM | WEIGHT: 135 LBS | HEART RATE: 90 BPM | TEMPERATURE: 97.8 F | HEIGHT: 61 IN

## 2020-05-27 DIAGNOSIS — H60.61 CHRONIC OTITIS EXTERNA OF RIGHT EAR, UNSPECIFIED TYPE: Primary | ICD-10-CM

## 2020-05-27 DIAGNOSIS — E10.65 TYPE 1 DIABETES MELLITUS WITH HYPERGLYCEMIA, WITH LONG-TERM CURRENT USE OF INSULIN (HCC): ICD-10-CM

## 2020-05-27 PROCEDURE — 99213 OFFICE O/P EST LOW 20 MIN: CPT | Performed by: FAMILY MEDICINE

## 2020-05-27 PROCEDURE — 1036F TOBACCO NON-USER: CPT | Performed by: FAMILY MEDICINE

## 2020-05-27 PROCEDURE — 3008F BODY MASS INDEX DOCD: CPT | Performed by: FAMILY MEDICINE

## 2020-05-27 RX ORDER — PREDNISONE 10 MG/1
TABLET ORAL
COMMUNITY
Start: 2020-05-26 | End: 2021-08-16 | Stop reason: ALTCHOICE

## 2020-07-22 ENCOUNTER — TELEPHONE (OUTPATIENT)
Dept: FAMILY MEDICINE CLINIC | Facility: MEDICAL CENTER | Age: 40
End: 2020-07-22

## 2020-07-22 NOTE — TELEPHONE ENCOUNTER
Pt is considering taking FMLA but wants to discuss with you before she starts the paperwork   She was a bit vague, only wants to discuss with you

## 2020-07-24 NOTE — TELEPHONE ENCOUNTER
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

## 2020-07-27 ENCOUNTER — TELEPHONE (OUTPATIENT)
Dept: FAMILY MEDICINE CLINIC | Facility: MEDICAL CENTER | Age: 40
End: 2020-07-27

## 2020-07-27 NOTE — TELEPHONE ENCOUNTER
Triaged-woke this morning feeling lousy  Blood shot eyes, throat tenderness, extremities felt weak, chest heaviness, stiff neck, body aches  Possible side effects to a different isotretinoin drug ( diff   ) was told to stop it 5 days ago due to abnormality in RBC's, cracked lips and dry eyes ( cbc in care everywhere)

## 2020-07-27 NOTE — TELEPHONE ENCOUNTER
Pt called requesting appt  She is feeling "odd"   She thinks she maybe having a reaction to medication the derm stated her on  Her eyes are red, throat feels weird, general fatigue   Should this be virtual?

## 2020-07-28 ENCOUNTER — TELEMEDICINE (OUTPATIENT)
Dept: FAMILY MEDICINE CLINIC | Facility: MEDICAL CENTER | Age: 40
End: 2020-07-28
Payer: COMMERCIAL

## 2020-07-28 VITALS — TEMPERATURE: 97.8 F

## 2020-07-28 DIAGNOSIS — B30.9 ACUTE VIRAL CONJUNCTIVITIS OF BOTH EYES: Primary | ICD-10-CM

## 2020-07-28 PROCEDURE — 99213 OFFICE O/P EST LOW 20 MIN: CPT | Performed by: FAMILY MEDICINE

## 2020-07-28 PROCEDURE — 1036F TOBACCO NON-USER: CPT | Performed by: FAMILY MEDICINE

## 2020-07-29 ENCOUNTER — TELEPHONE (OUTPATIENT)
Dept: FAMILY MEDICINE CLINIC | Facility: MEDICAL CENTER | Age: 40
End: 2020-07-29

## 2020-07-29 DIAGNOSIS — M79.10 MYALGIA: Primary | ICD-10-CM

## 2020-07-29 DIAGNOSIS — B30.8 CHRONIC VIRAL CONJUNCTIVITIS OF BOTH EYES: ICD-10-CM

## 2020-07-29 PROCEDURE — 99214 OFFICE O/P EST MOD 30 MIN: CPT | Performed by: FAMILY MEDICINE

## 2020-07-29 RX ORDER — SULFACETAMIDE SODIUM 100 MG/ML
1 SOLUTION/ DROPS OPHTHALMIC 4 TIMES DAILY
Qty: 15 ML | Refills: 0 | Status: SHIPPED | OUTPATIENT
Start: 2020-07-29 | End: 2021-08-16 | Stop reason: ALTCHOICE

## 2020-07-29 NOTE — TELEPHONE ENCOUNTER
Patient called, she was to call back today to give you update on eyes from yesterdays visit  There has been no change  She has appointment with Ophthalmology 2 pm today, you were going to let her know if she should go, please call asap so she can let them know

## 2020-07-29 NOTE — PROGRESS NOTES
Virtual Regular Visit      Assessment/Plan:    Problem List Items Addressed This Visit     None               Reason for visit is   Chief Complaint   Patient presents with    Fever    Fatigue    Sore Throat        Encounter provider Gm Avalos MD    Provider located at [facility] [address] [id number]      Recent Visits  Date Type Provider Dept   07/27/20 Telephone Mayra Gallardo MA [facility]   07/22/20 Telephone Mayra Gallardo MA [facility]   Showing recent visits within past 7 days and meeting all other requirements     Today's Visits  Date Type Provider Dept   07/28/20 Telemedicine Gm Avalos MD [facility]   Showing today's visits and meeting all other requirements     Future Appointments  No visits were found meeting these conditions  Showing future appointments within next 150 days and meeting all other requirements        The patient was identified by name and date of birth  Jung Hamilton was informed that this is a telemedicine visit and that the visit is being conducted through [facility] [place] and patient was informed that this is not a secure, HIPAA-complaint platform  She agrees to proceed     My office door was closed  No one else was in the room  She acknowledged consent and understanding of privacy and security of the video platform  The patient has agreed to participate and understands they can discontinue the visit at any time  Patient is aware this is a billable service  Subjective  Jung Hamilton is a 45 y o  female who complains of not feeling well for the last 2 days  TAMICA Obregon said that she has not felt well for the past week  She attributed this to the side effects from Accutane which was stopped 5 days ago  She said yesterday she fell harmful  She had nasal stuffiness, congestion, weakness, throat tightness, pressure in her head, heavy chest, and malaise    There was no cough or fever or chills  There was no diarrhea  She did have some redness in both eyes with some crustiness when she woke up this morning  However all the above symptoms have now significantly improved as of today  She has no history of seasonal allergies  She is waiting to hear back from Dermatology regarding her CBC which showed some ovalocytes and slightly elevated eosinophil count  She believes she was feeling poorly due to the  Accutane side effects which she has had trouble with in the past       Past Medical History:   Diagnosis Date    Celiac disease     Encounter for smoking cessation counseling     Hypertension     Type 1 diabetes ([facility] [address] )        Past Surgical History:   Procedure Laterality Date    COLONOSCOPY         Current Outpatient Medications   Medication Sig Dispense Refill    ALPRAZolam (XANAX) 0 25 mg tablet Take 1 tablet (0 25 mg total) by mouth 2 (two) times a day as needed for anxiety 30 tablet 0    clobetasol (TEMOVATE) 0 05 % cream Apply TWICE DAILY TO HANDS FOR 2 WEEKS max   Can REPEAT AFTER a 1-2 WEEK break INFECTION needed      CONTOUR NEXT TEST test strip TESTING EIGHT TIMES DAILY      fluconazole (DIFLUCAN) 150 mg tablet as needed   3    glucagon (GLUCAGON EMERGENCY) 1 MG injection Inject as directed      insulin detemir (LEVEMIR) 100 units/mL subcutaneous injection Inject under the skin daily at bedtime      ISOtretinoin (ACCUTANE) 30 MG capsule Take 60 mg by mouth daily      Ketone Blood Test STRP by In Vitro route      levonorgestrel (MIRENA) 20 MCG/24HR IUD 20 mcg/day      NOVOLOG 100 UNIT/ML injection USE UP TO 50 UNITS VIA PUMP DAILY 50 mL 0    nystatin (MYCOSTATIN) ointment as needed       pantoprazole (PROTONIX) 40 mg tablet TAKE 1 TABLET IN THE MORNING 30 MINUTES BEFORE BREAKFAST  6    predniSONE 10 mg tablet       triamcinolone (KENALOG) 0 1 % ointment APPLY TWICE A DAY AS NEEDED FOR ONE WEEK      Urine Glucose-Ketones Test STRP Check urine ketones when blood sugar >300, if +ve call  strip 3    Cholecalciferol (VITAMIN D3) 55357 units CAPS Take 5,000 Units by mouth daily      neomycin-polymyxin-hydrocortisone (CORTISPORIN) 0 35%-10,000 units/mL-1% otic suspension Administer 4 drops to the right ear every 6 (six) hours Use for 2 weeks  (Patient not taking: Reported on 5/27/2020) 10 mL 1     No current facility-administered medications for this visit  No Known Allergies    Review of Systems    Video Exam    Vitals:    07/28/20 1112   Temp: 97 8 °F (36 6 °C)       Physical Exam   She does not appear ill  Her conjunctiva do look slightly injected  Assessment/Plan  Symptoms may be consistent with a viral illness or perhaps side effects from her Accutane  In any case since she is significantly improved we are going to observe  I have asked her to call back tomorrow morning with progress we will decide further management with regard to the eye symptoms  Advised not to a go to her routine ophthalmology appointment tomorrow  I spent 15 minutes directly with the patient during this visit      VIRTUAL VISIT DISCLAIMER    Ed Parada acknowledges that she has consented to an online visit or consultation  She understands that the online visit is based solely on information provided by her, and that, in the absence of a face-to-face physical evaluation by the physician, the diagnosis she receives is both limited and provisional in terms of accuracy and completeness  This is not intended to replace a full medical face-to-face evaluation by the physician  Ed Parada understands and accepts these terms

## 2020-07-29 NOTE — PROGRESS NOTES
COVID-19 Virtual Visit     Assessment/Plan:    Problem List Items Addressed This Visit     None      Visit Diagnoses     Myalgia    -  Primary    Relevant Orders    Novel Coronavirus (NEERW-03), PCR [facility] - Collected at   [facility] or [facility]        This virtual check-in was done via telephone  Disposition:      I referred Daniel Kat to one of our centralized sites for a COVID-19 swab    I spent 10 minutes directly with the patient during this visit    Encounter provider Shabana Espinoza MD    Provider located at [address] [place] [phone]    Recent Visits  Date Type Provider Dept   07/28/20 Telemedicine Shabana Espinoza MD [facility]   07/27/20 Telephone Tsering Mask, [facility] [place] [facility]   07/22/20 Telephone Tsering Mask, MA [facility]   Showing recent visits within past 7 days and meeting all other requirements     Today's Visits  Date Type Provider Dept   07/29/20 Telephone Shabana Espinoza MD [facility]   Showing today's visits and meeting all other requirements     Future Appointments  No visits were found meeting these conditions  Showing future appointments within next 150 days and meeting all other requirements        Patient agrees to participate in a virtual check in via telephone or video visit instead of presenting to the office to address urgent/immediate medical needs  Patient is aware this is a billable service  After connecting through telephone, the patient was identified by name and date of birth  Gabrielle Andrade was informed that this was a telemedicine visit and that the exam was being conducted confidentially over secure lines  My office door was closed  No one else was in the room  Gabrielle Andrade acknowledged consent and understanding of privacy and security of the telemedicine visit    I informed the patient that I have reviewed her record in Epic and presented the opportunity for her to ask any questions regarding the visit today  The patient agreed to participate  Nate Gauthier is a 45 y o  female who is concerned about COVID-19  See notes from yesterday 7/28  Patient states that she does not feel is improved as she did yesterday  She still has chills and muscle aches  She does not feel well in general   She still has red eyes with some crustiness this morning  She has some discomfort on the right side of her throat  No fever, cough, shortness of breath, loss of taste or smell  She reports chills and myalgias  She has  experienced chills She has not had contact with a person who is under investigation for or who is positive for COVID-19 within the last 14 days  She has not been hospitalized recently for fever and/or lower respiratory symptoms  Due to persistent I symptoms will treat for bacterial conjunctivitis  Rx for Bleph 10 sent to her pharmacy  Infection precautions with regard to conjunctivitis  Will do COVID testing  She was also advised to quarantine until COVID results are known  Past Medical History:   Diagnosis Date    Celiac disease     Encounter for smoking cessation counseling     Hypertension     [facility]        Past Surgical History:   Procedure Laterality Date    COLONOSCOPY         Current Outpatient Medications   Medication Sig Dispense Refill    ALPRAZolam (XANAX) 0 25 mg tablet Take 1 tablet (0 25 mg total) by mouth 2 (two) times a day as needed for anxiety 30 tablet 0    Cholecalciferol (VITAMIN D3) 53939 units CAPS Take 5,000 Units by mouth daily      clobetasol (TEMOVATE) 0 05 % cream Apply TWICE DAILY TO HANDS FOR 2 WEEKS max   Can REPEAT AFTER a 1-2 WEEK break INFECTION needed      CONTOUR NEXT TEST test strip TESTING EIGHT TIMES DAILY      fluconazole (DIFLUCAN) 150 mg tablet as needed   3    glucagon (GLUCAGON EMERGENCY) 1 MG injection Inject as directed      insulin detemir (LEVEMIR) 100 units/mL subcutaneous injection Inject under the skin daily at bedtime      ISOtretinoin (ACCUTANE) 30 MG capsule Take 60 mg by mouth daily      Ketone Blood Test STRP by In Vitro route      levonorgestrel (MIRENA) 20 MCG/24HR IUD 20 mcg/day      neomycin-polymyxin-hydrocortisone (CORTISPORIN) 0 35%-10,000 units/mL-1% otic suspension Administer 4 drops to the right ear every 6 (six) hours Use for 2 weeks  (Patient not taking: Reported on 5/27/2020) 10 mL 1    NOVOLOG 100 UNIT/ML injection USE UP TO 50 UNITS VIA PUMP DAILY 50 mL 0    nystatin (MYCOSTATIN) ointment as needed       pantoprazole (PROTONIX) 40 mg tablet TAKE 1 TABLET IN THE MORNING 30 MINUTES BEFORE BREAKFAST  6    predniSONE 10 mg tablet       triamcinolone (KENALOG) 0 1 % ointment APPLY TWICE A DAY AS NEEDED FOR ONE WEEK      Urine Glucose-Ketones Test STRP Check urine ketones when blood sugar >300, if +ve call  strip 3     No current facility-administered medications for this visit  No Known Allergies    Review of Systems    Video Exam    There were no vitals filed for this visit  Daniel Kat appears no distress  Physical Exam     VIRTUAL VISIT DISCLAIMER    Gabriellezack Andrade acknowledges that she has consented to an online visit or consultation  She understands that the online visit is based solely on information provided by her, and that, in the absence of a face-to-face physical evaluation by the physician, the diagnosis she receives is both limited and provisional in terms of accuracy and completeness  This is not intended to replace a full medical face-to-face evaluation by the physician  Gabrielle Andrade understands and accepts these terms

## 2020-07-30 DIAGNOSIS — M79.10 MYALGIA: ICD-10-CM

## 2020-07-30 PROCEDURE — U0003 INFECTIOUS AGENT DETECTION BY NUCLEIC ACID (DNA OR RNA); SEVERE ACUTE RESPIRATORY SYNDROME CORONAVIRUS 2 (SARS-COV-2) (CORONAVIRUS DISEASE [COVID-19]), AMPLIFIED PROBE TECHNIQUE, MAKING USE OF HIGH THROUGHPUT TECHNOLOGIES AS DESCRIBED BY CMS-2020-01-R: HCPCS | Performed by: FAMILY MEDICINE

## 2020-08-01 LAB — SARS-COV-2 RNA SPEC QL NAA+PROBE: NOT DETECTED

## 2020-09-02 ENCOUNTER — TELEPHONE (OUTPATIENT)
Dept: ENDOCRINOLOGY | Facility: CLINIC | Age: 40
End: 2020-09-02

## 2020-09-02 LAB
LEFT EYE DIABETIC RETINOPATHY: NORMAL
RIGHT EYE DIABETIC RETINOPATHY: NORMAL

## 2021-03-29 ENCOUNTER — TELEPHONE (OUTPATIENT)
Dept: FAMILY MEDICINE CLINIC | Facility: MEDICAL CENTER | Age: 41
End: 2021-03-29

## 2021-03-29 DIAGNOSIS — Z20.822 CLOSE EXPOSURE TO COVID-19 VIRUS: Primary | ICD-10-CM

## 2021-03-29 NOTE — TELEPHONE ENCOUNTER
fyi-Mom and both daughters exposed to positive covid Friday9 person was not symptomatic, it was a screening)  mom wants to be tested as well as the girls  Mom had the vaccine, aware it is not suggested if symptomatic and does not have to quarantine but the girls do and can be tested on the 5th day as long as they are asymptomatic  mom understands instruction, wants to still be tested herself and the girls   Aware of cost  orders placed for all three

## 2021-03-30 DIAGNOSIS — Z20.822 CLOSE EXPOSURE TO COVID-19 VIRUS: ICD-10-CM

## 2021-03-30 PROCEDURE — U0003 INFECTIOUS AGENT DETECTION BY NUCLEIC ACID (DNA OR RNA); SEVERE ACUTE RESPIRATORY SYNDROME CORONAVIRUS 2 (SARS-COV-2) (CORONAVIRUS DISEASE [COVID-19]), AMPLIFIED PROBE TECHNIQUE, MAKING USE OF HIGH THROUGHPUT TECHNOLOGIES AS DESCRIBED BY CMS-2020-01-R: HCPCS | Performed by: FAMILY MEDICINE

## 2021-03-30 PROCEDURE — U0005 INFEC AGEN DETEC AMPLI PROBE: HCPCS | Performed by: FAMILY MEDICINE

## 2021-03-31 LAB — SARS-COV-2 RNA RESP QL NAA+PROBE: NEGATIVE

## 2021-07-02 ENCOUNTER — TELEPHONE (OUTPATIENT)
Dept: GASTROENTEROLOGY | Facility: CLINIC | Age: 41
End: 2021-07-02

## 2021-07-02 NOTE — TELEPHONE ENCOUNTER
Pt called to schedule EGD  She has a recall in [address] [place] [address] for 7/1/21 with Dr Lydia Allen, hx of garcia's  I called back and lmom for her to call back and we will get her scheduled  If she calls back please get her scheduled   Thank you

## 2021-08-16 RX ORDER — GLUCAGON 3 MG/1
3 POWDER NASAL
COMMUNITY
Start: 2021-06-02

## 2021-08-17 ENCOUNTER — HOSPITAL ENCOUNTER (OUTPATIENT)
Dept: GASTROENTEROLOGY | Facility: AMBULATORY SURGERY CENTER | Age: 41
Discharge: HOME | End: 2021-08-17
Payer: COMMERCIAL

## 2021-08-17 ENCOUNTER — ANESTHESIA EVENT (OUTPATIENT)
Dept: GASTROENTEROLOGY | Facility: AMBULATORY SURGERY CENTER | Age: 41
End: 2021-08-17

## 2021-08-17 ENCOUNTER — ANESTHESIA (OUTPATIENT)
Dept: GASTROENTEROLOGY | Facility: AMBULATORY SURGERY CENTER | Age: 41
End: 2021-08-17

## 2021-08-17 VITALS
BODY MASS INDEX: 24.84 KG/M2 | SYSTOLIC BLOOD PRESSURE: 105 MMHG | HEIGHT: 62 IN | DIASTOLIC BLOOD PRESSURE: 74 MMHG | TEMPERATURE: 96 F | HEART RATE: 76 BPM | OXYGEN SATURATION: 100 % | RESPIRATION RATE: 18 BRPM | WEIGHT: 135 LBS

## 2021-08-17 DIAGNOSIS — K22.70 BARRETT'S ESOPHAGUS WITHOUT DYSPLASIA: ICD-10-CM

## 2021-08-17 LAB
EXT PREGNANCY TEST URINE: NEGATIVE
EXT. CONTROL: NORMAL

## 2021-08-17 PROCEDURE — 43239 EGD BIOPSY SINGLE/MULTIPLE: CPT | Performed by: INTERNAL MEDICINE

## 2021-08-17 PROCEDURE — 00731 ANES UPR GI NDSC PX NOS: CPT | Performed by: NURSE ANESTHETIST, CERTIFIED REGISTERED

## 2021-08-17 PROCEDURE — 88305 TISSUE EXAM BY PATHOLOGIST: CPT | Performed by: PATHOLOGY

## 2021-08-17 RX ORDER — PROPOFOL 10 MG/ML
INJECTION, EMULSION INTRAVENOUS AS NEEDED
Status: DISCONTINUED | OUTPATIENT
Start: 2021-08-17 | End: 2021-08-17

## 2021-08-17 RX ORDER — LIDOCAINE HYDROCHLORIDE 10 MG/ML
INJECTION, SOLUTION EPIDURAL; INFILTRATION; INTRACAUDAL; PERINEURAL AS NEEDED
Status: DISCONTINUED | OUTPATIENT
Start: 2021-08-17 | End: 2021-08-17

## 2021-08-17 RX ORDER — SODIUM CHLORIDE 9 MG/ML
30 INJECTION, SOLUTION INTRAVENOUS CONTINUOUS
Status: DISCONTINUED | OUTPATIENT
Start: 2021-08-17 | End: 2021-08-21 | Stop reason: HOSPADM

## 2021-08-17 RX ADMIN — PROPOFOL 200 MG: 10 INJECTION, EMULSION INTRAVENOUS at 09:20

## 2021-08-17 RX ADMIN — PROPOFOL 100 MG: 10 INJECTION, EMULSION INTRAVENOUS at 09:23

## 2021-08-17 RX ADMIN — LIDOCAINE HYDROCHLORIDE 50 MG: 10 INJECTION, SOLUTION EPIDURAL; INFILTRATION; INTRACAUDAL; PERINEURAL at 09:20

## 2021-08-17 RX ADMIN — PROPOFOL 50 MG: 10 INJECTION, EMULSION INTRAVENOUS at 09:21

## 2021-08-17 RX ADMIN — SODIUM CHLORIDE: 9 INJECTION, SOLUTION INTRAVENOUS at 09:07

## 2021-08-17 RX ADMIN — PROPOFOL 50 MG: 10 INJECTION, EMULSION INTRAVENOUS at 09:22

## 2021-08-17 NOTE — DISCHARGE SUMMARY
Discharge Summary - Dogulas Azul 37 y o  female MRN: [id number]    Unit/Bed#:  Encounter: [id number]    Admission Date:  08/17/2021    Admitting Diagnosis: Simons's esophagus without dysplasia [K22 70]    HPI:  History of chronic reflux and recent abdominal pain    Procedures Performed: No orders of the defined types were placed in this encounter  Summary of Hospital Course: Tolerated procedure well  Significant Findings, Care, Treatment and Services Provided:  See procedure note    Complications:  None    Discharge Diagnosis:  See notes    Medical Problems     Resolved Problems  Date Reviewed: 8/17/2021    None                Condition at Discharge: good         Discharge instructions/Information to patient and family:   See after visit summary for information provided to patient and family  Provisions for Follow-Up Care:  See after visit summary for information related to follow-up care and any pertinent home health orders        PCP: Doni Birto MD    Disposition: Home

## 2021-08-17 NOTE — H&P
History and Physical - [facility]  Krissy Keane 37 y o  female MRN: [id number]                  HPI: Krissy Keane is a 37y o  year old female who presents for gastroesophageal reflux disease and small hiatal hernia  Reflux symptoms  Epigastric pain and discomfort  REVIEW OF SYSTEMS: Per the HPI, and otherwise unremarkable      Historical Information   Past Medical History:   Diagnosis Date    Celiac disease     Diabetes mellitus ([place] [address] ) 2021    IDDM, uses insulin pump    Encounter for smoking cessation counseling     GERD (gastroesophageal reflux disease)     Hypertension     Type [facility] diabetes ([facility] )      Past Surgical History:   Procedure Laterality Date    CERVICAL BIOPSY  W/ LOOP ELECTRODE EXCISION      COLONOSCOPY      UPPER GASTROINTESTINAL ENDOSCOPY      WISDOM TOOTH EXTRACTION       Social History   Social History     Substance and Sexual Activity   Alcohol Use Yes    Comment: approx 2 x week     Social History     Substance and Sexual Activity   Drug Use No     Social History     Tobacco Use   Smoking Status Former Smoker    Types: Cigarettes    Quit date: 9/10/2018    Years since quittin 9   Smokeless Tobacco Never Used     Family History   Problem Relation Age of Onset    Hypertension Mother     Hypertension Father     Melanoma Paternal Aunt     Skin cancer Family     Pancreatic cancer Maternal Grandfather     Stroke Paternal Grandfather        Meds/Allergies       Current Outpatient Medications:     Cholecalciferol 50 MCG ( UT) CAPS    clobetasol (TEMOVATE) 0 05 % cream    CONTOUR NEXT TEST test strip    cyanocobalamin (VITAMIN B-12) 100 MCG tablet    fluconazole (DIFLUCAN) 150 mg tablet    Glucagon ([employer]simi One Pack) 3 MG/DOSE POWD    glucagon (GLUCAGON EMERGENCY) 1 MG injection    insulin aspart, w/niacinamide, (FIASP) 100 Units/mL injection pen    insulin detemir (LEVEMIR) 100 units/mL subcutaneous injection    levonorgestrel (MIRENA) 20 MCG/24HR IUD    NOVOLOG 100 UNIT/ML injection    PATIENT MAINTAINED INSULIN PUMP    Ketone Blood Test STRP    Urine Glucose-Ketones Test STRP    Current Facility-Administered Medications:     sodium chloride 0 9 % infusion, 30 mL/hr, Intravenous, Continuous, Continue from Pre-op at 08/17/21 4487    Facility-Administered Medications Ordered in Other Encounters:     lidocaine (PF) (XYLOCAINE-MPF) 1 % injection, , Intravenous, PRN, 50 mg at 08/17/21 0920    propofol (DIPRIVAN) 200 MG/20ML bolus injection, , Intravenous, PRN, 200 mg at 08/17/21 0920    No Known Allergies    Objective     /79   Pulse 76   Temp (!) 96 °F (35 6 °C) (Temporal)   Resp 18   Ht 5' 2" (1 575 m)   Wt 61 2 kg (135 lb)   LMP 08/04/2021   SpO2 100%   BMI 24 69 kg/m²       PHYSICAL EXAM    Gen: NAD  Head: NCAT  CV: RRR  CHEST: Clear  ABD: soft, NT/ND  EXT: no edema      ASSESSMENT/PLAN:  This is a 37y o  year old female here for EGD, and she is stable and optimized for her procedure

## 2021-08-17 NOTE — DISCHARGE INSTRUCTIONS
Upper Endoscopy   WHAT YOU NEED TO KNOW:   An upper endoscopy is also called an upper gastrointestinal (GI) endoscopy, or an esophagogastroduodenoscopy (EGD)  It is a procedure to examine the inside of your esophagus, stomach, and duodenum (first part of the small intestine) with a scope  You may feel bloated, gassy, or have some abdominal discomfort after your procedure  Your throat may be sore for 24 to 36 hours  You may burp or pass gas from air that is still inside your body  DISCHARGE INSTRUCTIONS:   Seek care immediately if:    You have sudden, severe abdominal pain   You have problems swallowing   You have a large amount of black, sticky bowel movements or blood in your bowel movements   You have sudden trouble breathing   You feel weak, lightheaded, or faint or your heart beats faster than normal for you  Contact your healthcare provider if:    You have a fever and chills   You have nausea or are vomiting   Your abdomen is bloated or feels full and hard   You have abdominal pain   You have black, sticky bowel movements or blood in your bowel movements   You have not had a bowel movement for 3 days after your procedure   You have rash or hives   You have questions or concerns about your procedure  Activity:    Do not lift, strain, or run for 24 hours after your procedure   Rest after your procedure  You have been given medicine to relax you  Do not drive or make important decisions until the day after your procedure  Return to your normal activity as directed   Relieve gas and discomfort from bloating by lying on your right side with a heating pad on your abdomen  You may need to take short walks to help the gas move out  Eat small meals until bloating is relieved  Follow up with your healthcare provider as directed: Write down your questions so you remember to ask them during your visits       If you take a blood thinner, please review the specific instructions from your endoscopist about when you should resume it  These can be found in the Recommendation and Your Medication list sections of this After Visit Summary  Diabetes Type 1: Management   WHAT YOU NEED TO KNOW:   The goal of managing type 1 diabetes is to delay or prevent complications  Complications can include kidney disease, heart and blood vessel disease, and eye and skin conditions  DISCHARGE INSTRUCTIONS:   Have someone call your local emergency number (911 in the [address] [facility]) if:   · You cannot be woken  · You have signs of diabetic ketoacidosis:     ? confusion, fatigue    ? vomiting    ? rapid heartbeat    ? fruity smelling breath    ? extreme thirst    ? dry mouth and skin    · You have any of the following signs of a heart attack:      ? Squeezing, pressure, or pain in your chest    ? You may  also have any of the following:     § Discomfort or pain in your back, neck, jaw, stomach, or arm    § Shortness of breath    § Nausea or vomiting    § Lightheadedness or a sudden cold sweat    · You have any of the following signs of a stroke:      ? Numbness or drooping on one side of your face     ? Weakness in an arm or leg    ? Confusion or difficulty speaking    ? Dizziness, a severe headache, or vision loss    Call your provider if:   · You have a sore or wound that will not heal     · You have a change in the amount you urinate  · Your blood sugar levels are higher than your target goals  · You often have lower blood sugar levels than your target goals  · Your skin is red, dry, warm, or swollen  · You have trouble coping with diabetes, or you feel anxious or depressed  · You have questions or concerns about your condition or care  Manage your type 1 diabetes:   · Check your blood sugar levels as directed and as needed  Several items are available to use to check your levels  Talk with your provider to find out which is best for you   The goal for blood sugar levels before meals  is between 80 and 130 mg/dL and 2 hours after eating  is lower than 180 mg/dL  · Know what to do if your blood sugar level is too high or too low  Levels that remain too high or too low can be life-threatening  Learn the signs or symptoms of high blood sugar levels such, as increased urination and fatigue  Also learn the signs or symptoms of low levels such as shakiness, sweating, or irritability  Always glucose tablets or glucose gel to take if levels are too low  · Take your insulin as directed  You will need insulin for the rest of your life  Your insulin may be given by injections, or you may have an insulin pump  The pump gives you a constant amount of insulin through the day  The amount changes when the number of carbohydrates (carbs) are entered  Your provider will talk to you about the best way for you to receive insulin  · Count your carbs, protein, and fats correctly  You will learn the amount of carbs, protein, and fat you will need every day  Each meal should have a balance of carbs, proteins, and fats  Eat whole grains, raw fruits, and steamed vegetables for fiber  · Know the risks if you choose to drink alcohol  Alcohol can cause your blood sugar levels to be low if you use insulin  Alcohol can cause high blood sugar levels and weight gain if you drink too much  Women 21 years or older and men 72 years or older should limit alcohol to 1 drink a day  Men aged 24 to 59 years should limit alcohol to 2 drinks a day  A drink of alcohol is 12 ounces of beer, 5 ounces of wine, or 1½ ounces of liquor  · Do not smoke  Nicotine and other chemicals in cigarettes and cigars can cause lung and blood vessel damage  It also makes it more difficult to manage your diabetes  Ask your provider for information if you currently smoke and need help to quit  Do not use e-cigarettes or smokeless tobacco in place of cigarettes or to help you quit   They still contain nicotine  · Be active 5 days or more, for a total of 150 minutes, in a week  Physical activity helps to lower your blood sugar levels and helps with weight management  It can also help decrease kidney and heart problems  Your provider can help you create an activity plan  The plan can include the best activities for you  The plan will tell you what your blood sugar level should be before exercise  Check your blood sugar level before and after you exercise and before driving  · Check your feet each day for cuts, scratches, calluses, or other wounds  Look for redness and swelling, and feel for warmth  Wear shoes that fit well  Check your shoes for rocks or other objects that can hurt your feet  Do not walk barefoot or wear shoes without socks  Wear cotton socks to help keep your feet dry  · Wear medical alert identification  Wear medical alert jewelry or carry a card that says you have diabetes  Ask your provider where to get these items  · Go to follow-up appointments  You may be sent to different types of providers to check for complications of diabetes  Types of providers include heart (cardiologist), kidney (nephrologist), and nerve (neurologist) specialists  Complications of diabetes can start 3 to 5 years after your diagnosis  You will need to get your eyes checked every year or if you have problems with your vision  · Ask about vaccines  You have a higher risk for serious illness if you get the flu, pneumonia, or hepatitis  Ask your provider if you should get a flu, pneumonia, or hepatitis B vaccine, and when to get the vaccine  Follow up with your diabetes care team provider as directed: You may need to follow up with your provider more frequently if you are having problems  Write down your questions so you remember to ask them during your visits    © Copyright [employer] 2021 Information is for End User's use only and may not be sold, redistributed or otherwise used for commercial purposes  All illustrations and images included in CareNotes® are the copyrighted property of [employer]  or Jaylon Jones  The above information is an  only  It is not intended as medical advice for individual conditions or treatments  Talk to your doctor, nurse or pharmacist before following any medical regimen to see if it is safe and effective for you  GERD (Gastroesophageal Reflux Disease)   WHAT YOU NEED TO KNOW:   Gastroesophageal reflux disease (GERD) is reflux that occurs more than twice a week for a few weeks  Reflux means acid and food in the stomach back up into the esophagus  It usually causes heartburn and other symptoms  GERD can cause other health problems over time if it is not treated  DISCHARGE INSTRUCTIONS:   Call your local emergency number (911 in the [address] [facility]) if:   · You have severe chest pain and sudden trouble breathing  Seek care immediately if:   · You have trouble breathing after you vomit  · You have trouble swallowing, or pain with swallowing  · Your bowel movements are black, bloody, or tarry-looking  · Your vomit looks like coffee grounds or has blood in it  Call your doctor or gastroenterologist if:   · You feel full and cannot burp or vomit  · You vomit large amounts, or you vomit often  · You are losing weight without trying  · Your symptoms get worse or do not improve with treatment  · You have questions or concerns about your condition or care  Medicines:   · Medicines  are used to decrease stomach acid  Medicine may also be used to help your lower esophageal sphincter and stomach contract (tighten) more  · Take your medicine as directed  Contact your healthcare provider if you think your medicine is not helping or if you have side effects  Tell him or her if you are allergic to any medicine  Keep a list of the medicines, vitamins, and herbs you take  Include the amounts, and when and why you take them   Bring the list or the pill bottles to follow-up visits  Carry your medicine list with you in case of an emergency  Manage GERD:       · Do not have foods or drinks that may increase heartburn  These include chocolate, peppermint, fried or fatty foods, drinks that contain caffeine, or carbonated drinks (soda)  Other foods include spicy foods, onions, tomatoes, and tomato-based foods  Do not have foods or drinks that can irritate your esophagus, such as citrus fruits, juices, and alcohol  · Do not eat large meals  When you eat a lot of food at one time, your stomach needs more acid to digest it  Eat 6 small meals each day instead of 3 large ones, and eat slowly  Do not eat meals 2 to 3 hours before bedtime  · Elevate the head of your bed  Place 6-inch blocks under the head of your bed frame  You may also use more than one pillow under your head and shoulders while you sleep  · Maintain a healthy weight  If you are overweight, weight loss may help relieve symptoms of GERD  · Do not smoke  Smoking weakens the lower esophageal sphincter and increases the risk of GERD  Ask your healthcare provider for information if you currently smoke and need help to quit  E-cigarettes or smokeless tobacco still contain nicotine  Talk to your healthcare provider before you use these products  · Do not wear clothing that is tight around your waist   Tight clothing can put pressure on your stomach and cause or worsen GERD symptoms  Follow up with your doctor or gastroenterologist as directed:  Write down your questions so you remember to ask them during your visits  © Copyright [employer] 2021 Information is for End User's use only and may not be sold, redistributed or otherwise used for commercial purposes  All illustrations and images included in CareNotes® are the copyrighted property of [employer]  or Jaylon Jones  The above information is an  only   It is not intended as medical advice for individual conditions or treatments  Talk to your doctor, nurse or pharmacist before following any medical regimen to see if it is safe and effective for you

## 2021-08-17 NOTE — ANESTHESIA PREPROCEDURE EVALUATION
Procedure:  EGD    Relevant Problems   ANESTHESIA (within normal limits)      ENDO   (+) Type 1 diabetes mellitus with hyperglycemia, with long-term current use of insulin (HCC)   (+) Type I diabetes mellitus, uncontrolled (HCC)      NEURO/PSYCH   (+) Anxiety   (+) Depression      PULMONARY (within normal limits)        Physical Exam    Airway    Mallampati score: II  TM Distance: >3 FB  Neck ROM: full     Dental   No notable dental hx     Cardiovascular  Rhythm: regular, Rate: normal, Cardiovascular exam normal    Pulmonary  Pulmonary exam normal Breath sounds clear to auscultation,     Other Findings        Anesthesia Plan  ASA Score- 2     Anesthesia Type- IV sedation with anesthesia with ASA Monitors  Additional Monitors:   Airway Plan:     Comment: Nasal cannula  Plan Factors-Exercise tolerance (METS): >4 METS  Chart reviewed  EKG reviewed  Imaging results reviewed  Existing labs reviewed  Patient summary reviewed  Patient is not a current smoker  Patient not instructed to abstain from smoking on day of procedure  Patient did not smoke on day of surgery  Obstructive sleep apnea risk education given perioperatively  Induction- intravenous  Postoperative Plan-     Informed Consent- Anesthetic plan and risks discussed with patient  I personally reviewed this patient with the CRNA  Discussed and agreed on the Anesthesia Plan with the CRNA  Christina Rios

## 2021-08-17 NOTE — INTERVAL H&P NOTE
H&P reviewed  After examining the patient I find no changes in the patients condition since the H&P had been written      Vitals:    08/17/21 0850   BP: 108/79   Pulse: 76   Resp: 18   Temp: (!) 96 °F (35 6 °C)   SpO2: 100%

## 2021-08-27 ENCOUNTER — TELEPHONE (OUTPATIENT)
Dept: FAMILY MEDICINE CLINIC | Facility: MEDICAL CENTER | Age: 41
End: 2021-08-27

## 2021-08-27 DIAGNOSIS — R05.9 COUGH: ICD-10-CM

## 2021-08-27 DIAGNOSIS — Z11.52 ENCOUNTER FOR SCREENING FOR COVID-19: Primary | ICD-10-CM

## 2021-08-27 DIAGNOSIS — R09.81 NASAL SINUS CONGESTION: ICD-10-CM

## 2021-08-27 PROCEDURE — U0003 INFECTIOUS AGENT DETECTION BY NUCLEIC ACID (DNA OR RNA); SEVERE ACUTE RESPIRATORY SYNDROME CORONAVIRUS 2 (SARS-COV-2) (CORONAVIRUS DISEASE [COVID-19]), AMPLIFIED PROBE TECHNIQUE, MAKING USE OF HIGH THROUGHPUT TECHNOLOGIES AS DESCRIBED BY CMS-2020-01-R: HCPCS | Performed by: FAMILY MEDICINE

## 2021-08-27 PROCEDURE — U0005 INFEC AGEN DETEC AMPLI PROBE: HCPCS | Performed by: FAMILY MEDICINE

## 2021-08-27 NOTE — TELEPHONE ENCOUNTER
Pt said for 5 days she has been having symptoms of congestion, cough, sore throat, some h/a  No fever, gi symptoms  Not sure of loss of taste and smell due to congestion  She is vaccinated  Please, advise pt

## 2021-08-27 NOTE — TELEPHONE ENCOUNTER
Spoke with patient, based off of her current symptoms (listed in prevous note) recommended covid testing  Patient was unsure about going the weekend with current symptoms  Informed patient that she could [facility] to [facility] now for [facility] if needed  Did inform her that she will need to call ahead so they can review the protocol with her

## 2021-08-30 ENCOUNTER — TELEPHONE (OUTPATIENT)
Dept: FAMILY MEDICINE CLINIC | Facility: MEDICAL CENTER | Age: 41
End: 2021-08-30

## 2021-08-30 NOTE — TELEPHONE ENCOUNTER
----- Message from Rosendo Chua MD sent at 8/29/2021  8:49 PM EDT -----  Notify COVID positive   Needs triage

## 2021-08-30 NOTE — TELEPHONE ENCOUNTER
Triaged- pt aware, managing symptoms at home, no fever  C/o cough, congestion, body aches, tried  cdc guidelines discussed   Will call us if any concerns

## 2021-09-01 ENCOUNTER — TELEMEDICINE (OUTPATIENT)
Dept: FAMILY MEDICINE CLINIC | Facility: MEDICAL CENTER | Age: 41
End: 2021-09-01
Payer: COMMERCIAL

## 2021-09-01 ENCOUNTER — TELEPHONE (OUTPATIENT)
Dept: FAMILY MEDICINE CLINIC | Facility: MEDICAL CENTER | Age: 41
End: 2021-09-01

## 2021-09-01 DIAGNOSIS — U07.1 LAB TEST POSITIVE FOR DETECTION OF COVID-19 VIRUS: ICD-10-CM

## 2021-09-01 DIAGNOSIS — Z20.822 SUSPECTED COVID-19 VIRUS INFECTION: ICD-10-CM

## 2021-09-01 DIAGNOSIS — R05.9 COUGH: Primary | ICD-10-CM

## 2021-09-01 PROCEDURE — 99213 OFFICE O/P EST LOW 20 MIN: CPT | Performed by: FAMILY MEDICINE

## 2021-09-01 PROCEDURE — 1036F TOBACCO NON-USER: CPT | Performed by: FAMILY MEDICINE

## 2021-09-01 NOTE — TELEPHONE ENCOUNTER
Positive covid  Day 10  C/o chest tightness, dry tight cough, no fever, sounds raspy, denies SOB  Taking several OTC medications such as mucinex, Theraflu, vitamins etc  Please advise   CXR? VV?

## 2021-09-02 ENCOUNTER — APPOINTMENT (EMERGENCY)
Dept: CT IMAGING | Facility: HOSPITAL | Age: 41
DRG: 871 | End: 2021-09-02
Payer: COMMERCIAL

## 2021-09-02 ENCOUNTER — APPOINTMENT (EMERGENCY)
Dept: RADIOLOGY | Facility: HOSPITAL | Age: 41
DRG: 871 | End: 2021-09-02
Payer: COMMERCIAL

## 2021-09-02 ENCOUNTER — HOSPITAL ENCOUNTER (INPATIENT)
Facility: HOSPITAL | Age: 41
LOS: 4 days | Discharge: HOME | DRG: 871 | End: 2021-09-06
Attending: EMERGENCY MEDICINE | Admitting: INTERNAL MEDICINE
Payer: COMMERCIAL

## 2021-09-02 DIAGNOSIS — U07.1 COVID-19: ICD-10-CM

## 2021-09-02 DIAGNOSIS — J18.9 COMMUNITY ACQUIRED PNEUMONIA: ICD-10-CM

## 2021-09-02 DIAGNOSIS — A41.9 SEPSIS (HCC): Primary | ICD-10-CM

## 2021-09-02 DIAGNOSIS — J18.9 COMMUNITY ACQUIRED PNEUMONIA, UNSPECIFIED LATERALITY: ICD-10-CM

## 2021-09-02 DIAGNOSIS — U07.1 COVID-19 VIRUS INFECTION: ICD-10-CM

## 2021-09-02 LAB
ALBUMIN SERPL BCP-MCNC: 3.7 G/DL (ref 3.5–5)
ALP SERPL-CCNC: 88 U/L (ref 46–116)
ALT SERPL W P-5'-P-CCNC: 15 U/L (ref 12–78)
ANION GAP SERPL CALCULATED.3IONS-SCNC: 11 MMOL/L (ref 4–13)
AST SERPL W P-5'-P-CCNC: 11 U/L (ref 5–45)
BACTERIA UR QL AUTO: NORMAL /HPF
BASOPHILS # BLD AUTO: 0.03 THOUSANDS/ΜL (ref 0–0.1)
BASOPHILS NFR BLD AUTO: 0 % (ref 0–1)
BILIRUB SERPL-MCNC: 0.99 MG/DL (ref 0.2–1)
BILIRUB UR QL STRIP: ABNORMAL
BUN SERPL-MCNC: 8 MG/DL (ref 5–25)
CALCIUM SERPL-MCNC: 9.1 MG/DL (ref 8.3–10.1)
CHLORIDE SERPL-SCNC: 98 MMOL/L (ref 100–108)
CK SERPL-CCNC: 32 U/L (ref 26–192)
CLARITY UR: CLEAR
CO2 SERPL-SCNC: 28 MMOL/L (ref 21–32)
COLOR UR: ABNORMAL
CREAT SERPL-MCNC: 0.75 MG/DL (ref 0.6–1.3)
CRP SERPL QL: 110.8 MG/L
EOSINOPHIL # BLD AUTO: 0.02 THOUSAND/ΜL (ref 0–0.61)
EOSINOPHIL NFR BLD AUTO: 0 % (ref 0–6)
ERYTHROCYTE [DISTWIDTH] IN BLOOD BY AUTOMATED COUNT: 12.1 % (ref 11.6–15.1)
GFR SERPL CREATININE-BSD FRML MDRD: 100 ML/MIN/1.73SQ M
GLUCOSE SERPL-MCNC: 226 MG/DL (ref 65–140)
GLUCOSE UR STRIP-MCNC: ABNORMAL MG/DL
HCT VFR BLD AUTO: 44.2 % (ref 34.8–46.1)
HGB BLD-MCNC: 14.4 G/DL (ref 11.5–15.4)
HGB UR QL STRIP.AUTO: NEGATIVE
IMM GRANULOCYTES # BLD AUTO: 0.2 THOUSAND/UL (ref 0–0.2)
IMM GRANULOCYTES NFR BLD AUTO: 1 % (ref 0–2)
KETONES UR STRIP-MCNC: ABNORMAL MG/DL
LEUKOCYTE ESTERASE UR QL STRIP: NEGATIVE
LYMPHOCYTES # BLD AUTO: 1.09 THOUSANDS/ΜL (ref 0.6–4.47)
LYMPHOCYTES NFR BLD AUTO: 6 % (ref 14–44)
MCH RBC QN AUTO: 29.6 PG (ref 26.8–34.3)
MCHC RBC AUTO-ENTMCNC: 32.6 G/DL (ref 31.4–37.4)
MCV RBC AUTO: 91 FL (ref 82–98)
MONOCYTES # BLD AUTO: 1.09 THOUSAND/ΜL (ref 0.17–1.22)
MONOCYTES NFR BLD AUTO: 6 % (ref 4–12)
NEUTROPHILS # BLD AUTO: 16.34 THOUSANDS/ΜL (ref 1.85–7.62)
NEUTS SEG NFR BLD AUTO: 87 % (ref 43–75)
NITRITE UR QL STRIP: NEGATIVE
NON-SQ EPI CELLS URNS QL MICRO: NORMAL /HPF
NRBC BLD AUTO-RTO: 0 /100 WBCS
NT-PROBNP SERPL-MCNC: 132 PG/ML
PH UR STRIP.AUTO: 6 [PH] (ref 4.5–8)
PLATELET # BLD AUTO: 224 THOUSANDS/UL (ref 149–390)
PMV BLD AUTO: 10.2 FL (ref 8.9–12.7)
POTASSIUM SERPL-SCNC: 4.4 MMOL/L (ref 3.5–5.3)
PROCALCITONIN SERPL-MCNC: 0.32 NG/ML
PROT SERPL-MCNC: 8 G/DL (ref 6.4–8.2)
PROT UR STRIP-MCNC: ABNORMAL MG/DL
RBC # BLD AUTO: 4.86 MILLION/UL (ref 3.81–5.12)
RBC #/AREA URNS AUTO: NORMAL /HPF
SODIUM SERPL-SCNC: 137 MMOL/L (ref 136–145)
SP GR UR STRIP.AUTO: >=1.03 (ref 1–1.03)
TROPONIN I SERPL-MCNC: <0.02 NG/ML
UROBILINOGEN UR QL STRIP.AUTO: 0.2 E.U./DL
WBC # BLD AUTO: 18.77 THOUSAND/UL (ref 4.31–10.16)
WBC #/AREA URNS AUTO: NORMAL /HPF

## 2021-09-02 PROCEDURE — 83880 ASSAY OF NATRIURETIC PEPTIDE: CPT

## 2021-09-02 PROCEDURE — G1004 CDSM NDSC: HCPCS

## 2021-09-02 PROCEDURE — 71045 X-RAY EXAM CHEST 1 VIEW: CPT

## 2021-09-02 PROCEDURE — 82550 ASSAY OF CK (CPK): CPT | Performed by: PHYSICIAN ASSISTANT

## 2021-09-02 PROCEDURE — 93005 ELECTROCARDIOGRAM TRACING: CPT

## 2021-09-02 PROCEDURE — 99285 EMERGENCY DEPT VISIT HI MDM: CPT | Performed by: PHYSICIAN ASSISTANT

## 2021-09-02 PROCEDURE — 36415 COLL VENOUS BLD VENIPUNCTURE: CPT | Performed by: PHYSICIAN ASSISTANT

## 2021-09-02 PROCEDURE — 96361 HYDRATE IV INFUSION ADD-ON: CPT

## 2021-09-02 PROCEDURE — 99285 EMERGENCY DEPT VISIT HI MDM: CPT

## 2021-09-02 PROCEDURE — 85025 COMPLETE CBC W/AUTO DIFF WBC: CPT | Performed by: PHYSICIAN ASSISTANT

## 2021-09-02 PROCEDURE — 81001 URINALYSIS AUTO W/SCOPE: CPT

## 2021-09-02 PROCEDURE — 96375 TX/PRO/DX INJ NEW DRUG ADDON: CPT

## 2021-09-02 PROCEDURE — 99223 1ST HOSP IP/OBS HIGH 75: CPT | Performed by: INTERNAL MEDICINE

## 2021-09-02 PROCEDURE — 96374 THER/PROPH/DIAG INJ IV PUSH: CPT

## 2021-09-02 PROCEDURE — 96376 TX/PRO/DX INJ SAME DRUG ADON: CPT

## 2021-09-02 PROCEDURE — 86140 C-REACTIVE PROTEIN: CPT | Performed by: PHYSICIAN ASSISTANT

## 2021-09-02 PROCEDURE — 84484 ASSAY OF TROPONIN QUANT: CPT | Performed by: PHYSICIAN ASSISTANT

## 2021-09-02 PROCEDURE — 71275 CT ANGIOGRAPHY CHEST: CPT

## 2021-09-02 PROCEDURE — 80053 COMPREHEN METABOLIC PANEL: CPT | Performed by: PHYSICIAN ASSISTANT

## 2021-09-02 PROCEDURE — 87040 BLOOD CULTURE FOR BACTERIA: CPT | Performed by: PHYSICIAN ASSISTANT

## 2021-09-02 PROCEDURE — 84145 PROCALCITONIN (PCT): CPT | Performed by: PHYSICIAN ASSISTANT

## 2021-09-02 RX ORDER — KETOROLAC TROMETHAMINE 30 MG/ML
15 INJECTION, SOLUTION INTRAMUSCULAR; INTRAVENOUS ONCE
Status: COMPLETED | OUTPATIENT
Start: 2021-09-02 | End: 2021-09-02

## 2021-09-02 RX ORDER — ALBUTEROL SULFATE 90 UG/1
2 AEROSOL, METERED RESPIRATORY (INHALATION) ONCE
Status: COMPLETED | OUTPATIENT
Start: 2021-09-02 | End: 2021-09-02

## 2021-09-02 RX ORDER — ONDANSETRON 2 MG/ML
4 INJECTION INTRAMUSCULAR; INTRAVENOUS ONCE
Status: COMPLETED | OUTPATIENT
Start: 2021-09-02 | End: 2021-09-02

## 2021-09-02 RX ORDER — BENZONATATE 100 MG/1
200 CAPSULE ORAL 3 TIMES DAILY PRN
Status: DISCONTINUED | OUTPATIENT
Start: 2021-09-02 | End: 2021-09-06 | Stop reason: HOSPADM

## 2021-09-02 RX ORDER — KETOROLAC TROMETHAMINE 30 MG/ML
30 INJECTION, SOLUTION INTRAMUSCULAR; INTRAVENOUS ONCE
Status: DISCONTINUED | OUTPATIENT
Start: 2021-09-02 | End: 2021-09-02

## 2021-09-02 RX ORDER — ACETAMINOPHEN 325 MG/1
650 TABLET ORAL EVERY 4 HOURS PRN
Status: DISCONTINUED | OUTPATIENT
Start: 2021-09-02 | End: 2021-09-06 | Stop reason: HOSPADM

## 2021-09-02 RX ORDER — IBUPROFEN 600 MG/1
600 TABLET ORAL EVERY 6 HOURS PRN
Status: DISCONTINUED | OUTPATIENT
Start: 2021-09-02 | End: 2021-09-06 | Stop reason: HOSPADM

## 2021-09-02 RX ORDER — ALBUTEROL SULFATE 90 UG/1
2 AEROSOL, METERED RESPIRATORY (INHALATION) EVERY 4 HOURS PRN
Status: DISCONTINUED | OUTPATIENT
Start: 2021-09-02 | End: 2021-09-06 | Stop reason: HOSPADM

## 2021-09-02 RX ORDER — LOPERAMIDE HYDROCHLORIDE 2 MG/1
2 CAPSULE ORAL 3 TIMES DAILY PRN
Status: DISCONTINUED | OUTPATIENT
Start: 2021-09-02 | End: 2021-09-06 | Stop reason: HOSPADM

## 2021-09-02 RX ORDER — GUAIFENESIN 600 MG
1200 TABLET, EXTENDED RELEASE 12 HR ORAL EVERY 12 HOURS SCHEDULED
Status: DISCONTINUED | OUTPATIENT
Start: 2021-09-02 | End: 2021-09-06 | Stop reason: HOSPADM

## 2021-09-02 RX ORDER — ACETAMINOPHEN 325 MG/1
975 TABLET ORAL ONCE
Status: COMPLETED | OUTPATIENT
Start: 2021-09-02 | End: 2021-09-02

## 2021-09-02 RX ORDER — ONDANSETRON 2 MG/ML
4 INJECTION INTRAMUSCULAR; INTRAVENOUS EVERY 6 HOURS PRN
Status: DISCONTINUED | OUTPATIENT
Start: 2021-09-02 | End: 2021-09-06 | Stop reason: HOSPADM

## 2021-09-02 RX ORDER — AZITHROMYCIN 250 MG/1
500 TABLET, FILM COATED ORAL EVERY 24 HOURS
Status: DISCONTINUED | OUTPATIENT
Start: 2021-09-02 | End: 2021-09-06 | Stop reason: HOSPADM

## 2021-09-02 RX ADMIN — CEFTRIAXONE 1000 MG: 10 INJECTION, POWDER, FOR SOLUTION INTRAVENOUS at 17:55

## 2021-09-02 RX ADMIN — SODIUM CHLORIDE 1000 ML: 0.9 INJECTION, SOLUTION INTRAVENOUS at 14:53

## 2021-09-02 RX ADMIN — KETOROLAC TROMETHAMINE 15 MG: 30 INJECTION, SOLUTION INTRAMUSCULAR; INTRAVENOUS at 15:58

## 2021-09-02 RX ADMIN — AZITHROMYCIN MONOHYDRATE 500 MG: 250 TABLET ORAL at 20:06

## 2021-09-02 RX ADMIN — KETOROLAC TROMETHAMINE 15 MG: 30 INJECTION, SOLUTION INTRAMUSCULAR; INTRAVENOUS at 14:54

## 2021-09-02 RX ADMIN — ACETAMINOPHEN 650 MG: 325 TABLET, FILM COATED ORAL at 20:58

## 2021-09-02 RX ADMIN — IOHEXOL 85 ML: 350 INJECTION, SOLUTION INTRAVENOUS at 16:18

## 2021-09-02 RX ADMIN — ONDANSETRON 4 MG: 2 INJECTION INTRAMUSCULAR; INTRAVENOUS at 14:54

## 2021-09-02 RX ADMIN — GUAIFENESIN 1200 MG: 600 TABLET, EXTENDED RELEASE ORAL at 20:52

## 2021-09-02 RX ADMIN — ACETAMINOPHEN 975 MG: 325 TABLET ORAL at 16:01

## 2021-09-02 RX ADMIN — ALBUTEROL SULFATE 2 PUFF: 90 AEROSOL, METERED RESPIRATORY (INHALATION) at 14:54

## 2021-09-02 NOTE — ED PROVIDER NOTES
History  Chief Complaint   Patient presents with    Decreased Oxygen Level     Patient COVID + as of 8/27/21 denies CP/SOB, states "I am just not getting better  O2 90% at home"     Afsaneh Schrader is a 37 y o  female with past medical history of type 1 diabetes who presents to the ED with complaints of generalized body aches  Patient started feeling sick on 08/23/2021 and tested positive for COVID 19 on 08/27/2021  Patient reports home puls ox has been as low as 90%  Patient states she has been having chest tightness and feeling like she is having more difficulty breathing  Patient is fully vaccinated of COVID-19  Patient states she has been taking Advil for body aches but did not take any medications prior to arrival   Patient reports decreased appetite but has been able to eat and drink fruits and vegetables and fluids  Patient states she had a virtual visit with her PCP yesterday who ordered a chest x-ray however she presented to the emergency room due to her low oxygen levels at home  Patient is vaccinated against COVID 19  History provided by:  Patient      Prior to Admission Medications   Prescriptions Last Dose Informant Patient Reported? Taking?    CONTOUR NEXT TEST test strip 9/2/2021 at Unknown time  Yes Yes   Sig: TESTING EIGHT TIMES DAILY   Cholecalciferol 50 MCG (2000 UT) CAPS   Yes No   Sig: Take 1 capsule by mouth   Glucagon (Baqsimi One Pack) 3 MG/DOSE POWD   Yes No   Sig: 3 mg   Ketone Blood Test STRP 9/2/2021 at Unknown time Self Yes Yes   Sig: by In Vitro route   NOVOLOG 100 UNIT/ML injection 9/2/2021 at Unknown time  No Yes   Sig: USE UP TO 50 UNITS VIA PUMP DAILY   PATIENT MAINTAINED INSULIN PUMP 9/2/2021 at Unknown time  Yes Yes   Sig: CHANGE EVERY 3 DAYS AS DIRECTED   Urine Glucose-Ketones Test STRP 9/2/2021 at Unknown time  No Yes   Sig: Check urine ketones when blood sugar >300, if +ve call MD   clobetasol (TEMOVATE) 0 05 % cream Not Taking at Unknown time  Yes No   Sig: Apply TWICE DAILY TO HANDS FOR 2 WEEKS max  Can REPEAT AFTER a 1-2 WEEK break INFECTION needed   Patient not taking: Reported on 2021   cyanocobalamin (VITAMIN B-12) 100 MCG tablet 2021 at Unknown time  Yes Yes   Sig: Take 100 mcg by mouth daily   fluconazole (DIFLUCAN) 150 mg tablet Not Taking at Unknown time  Yes No   Sig: as needed    Patient not taking: Reported on 2021   glucagon (GLUCAGON EMERGENCY) 1 MG injection  Self Yes No   Sig: Inject as directed   insulin aspart, w/niacinamide, (FIASP) 100 Units/mL injection pen   Yes No   Sig: FOR PUMP FAILURE 40 UNITS DAILY   insulin detemir (LEVEMIR) 100 units/mL subcutaneous injection   Yes No   Sig: Inject under the skin daily at bedtime   levonorgestrel (MIRENA) 20 MCG/24HR IUD 2021 at Unknown time Self Yes Yes   Si mcg/day      Facility-Administered Medications: None       Past Medical History:   Diagnosis Date    Celiac disease     Diabetes mellitus ([facility] ) 2021    IDDM, uses insulin pump    Encounter for smoking cessation counseling     GERD (gastroesophageal reflux disease)     Hypertension     during pregnancy    Type 1 diabetes (HCC)        Past Surgical History:   Procedure Laterality Date    CERVICAL BIOPSY  W/ LOOP ELECTRODE EXCISION      COLONOSCOPY      UPPER GASTROINTESTINAL ENDOSCOPY      WISDOM TOOTH EXTRACTION         Family History   Problem Relation Age of Onset    Hypertension Mother     Hypertension Father     Melanoma Paternal Aunt     Skin cancer Family     Pancreatic cancer Maternal Grandfather     Stroke Paternal Grandfather      I have reviewed and agree with the history as documented      E-Cigarette/Vaping    E-Cigarette Use Never User      E-Cigarette/Vaping Substances    Nicotine No     THC No     CBD No     Flavoring No     Other No     Unknown No      Social History     Tobacco Use    Smoking status: Former Smoker     Types: Cigarettes     Quit date: 9/10/2018     Years since quittin 9    Smokeless tobacco: Never Used   Vaping Use    Vaping Use: Never used   Substance Use Topics    Alcohol use: Yes     Comment: approx 2 x week    Drug use: No       Review of Systems   Constitutional: Positive for activity change, appetite change and fatigue  Negative for chills, fever and unexpected weight change  HENT: Positive for congestion  Negative for drooling, ear pain, rhinorrhea, sore throat, trouble swallowing and voice change  LOT/LOS   Eyes: Negative for pain, discharge, redness and visual disturbance  Respiratory: Positive for chest tightness, shortness of breath and wheezing  Negative for cough and stridor  Cardiovascular: Negative for chest pain, palpitations and leg swelling  Gastrointestinal: Positive for diarrhea and nausea  Negative for abdominal pain, blood in stool, constipation and vomiting  Genitourinary: Negative for dysuria, flank pain, frequency, hematuria and urgency  Musculoskeletal: Positive for myalgias  Negative for gait problem, joint swelling, neck pain and neck stiffness  Skin: Negative for color change and rash  Neurological: Positive for weakness  Negative for dizziness, seizures, light-headedness and headaches  Physical Exam  Physical Exam  Vitals and nursing note reviewed  Constitutional:       General: She is not in acute distress  Appearance: She is well-developed  She is ill-appearing  HENT:      Head: Normocephalic and atraumatic  Nose: Nose normal       Mouth/Throat:      Mouth: Mucous membranes are dry  Eyes:      Conjunctiva/sclera: Conjunctivae normal       Pupils: Pupils are equal, round, and reactive to light  Cardiovascular:      Rate and Rhythm: Regular rhythm  Tachycardia present  Pulmonary:      Effort: Pulmonary effort is normal       Breath sounds: Wheezing present  Abdominal:      General: Abdomen is flat  Bowel sounds are normal       Tenderness: There is no abdominal tenderness     Musculoskeletal: General: Normal range of motion  Skin:     General: Skin is warm and dry  Capillary Refill: Capillary refill takes less than 2 seconds  Neurological:      Mental Status: She is alert and oriented to person, place, and time  Vital Signs  ED Triage Vitals   Temperature Pulse Respirations Blood Pressure SpO2   09/02/21 1152 09/02/21 1152 09/02/21 1152 09/02/21 1152 09/02/21 1152   99 4 °F (37 4 °C) (!) 116 16 132/75 98 %      Temp Source Heart Rate Source Patient Position - Orthostatic VS BP Location FiO2 (%)   09/02/21 1152 09/02/21 1152 09/02/21 1152 09/02/21 1152 --   Oral Monitor Lying Left arm       Pain Score       09/02/21 1339       7           Vitals:    09/02/21 1339 09/02/21 1343 09/02/21 1530 09/02/21 1632   BP: 127/75   100/56   Pulse: 103 (!) 125 (!) 114 (!) 107   Patient Position - Orthostatic VS: Lying   Sitting         Visual Acuity      ED Medications  Medications   ceftriaxone (ROCEPHIN) 1 g/50 mL in dextrose IVPB (1,000 mg Intravenous New Bag 9/2/21 1755)   sodium chloride 0 9 % bolus 1,000 mL (0 mL Intravenous Stopped 9/2/21 1735)   ondansetron (ZOFRAN) injection 4 mg (4 mg Intravenous Given 9/2/21 1454)   ketorolac (TORADOL) injection 15 mg (15 mg Intravenous Given 9/2/21 1454)   albuterol (PROVENTIL HFA,VENTOLIN HFA) inhaler 2 puff (2 puffs Inhalation Given 9/2/21 1454)   ketorolac (TORADOL) injection 15 mg (15 mg Intravenous Given 9/2/21 1558)   acetaminophen (TYLENOL) tablet 975 mg (975 mg Oral Given 9/2/21 1601)   iohexol (OMNIPAQUE) 350 MG/ML injection (SINGLE-DOSE) 100 mL (85 mL Intravenous Given 9/2/21 1618)       Diagnostic Studies  Results Reviewed     Procedure Component Value Units Date/Time    C-reactive protein [[id number]] Collected: 09/02/21 1437    Lab Status: In process Specimen: Blood from Arm, Left Updated: 09/02/21 1756    CK (with reflex to MB) [[id number]] Collected: 09/02/21 143    Lab Status:  In process Specimen: Blood from Arm, Left Updated: 09/02/21 1756 Blood culture #2 [[id number]] Collected: 09/02/21 1753    Lab Status: No result Specimen: Blood from Arm, Right     Procalcitonin with AM Reflex [[id number]] Collected: 09/02/21 1749    Lab Status: No result Specimen: Blood from Arm, Left     Blood culture #1 [[id number]] Collected: 09/02/21 1745    Lab Status: No result Specimen: Blood from Arm, Left     Comprehensive metabolic panel [[id number]]  (Abnormal) Collected: 09/02/21 1437    Lab Status: Final result Specimen: Blood from Arm, Left Updated: 09/02/21 1510     Sodium 137 mmol/L      Potassium 4 4 mmol/L      Chloride 98 mmol/L      CO2 28 mmol/L      ANION GAP 11 mmol/L      BUN 8 mg/dL      Creatinine 0 75 mg/dL      Glucose 226 mg/dL      Calcium 9 1 mg/dL      AST 11 U/L      ALT 15 U/L      Alkaline Phosphatase 88 U/L      Total Protein 8 0 g/dL      Albumin 3 7 g/dL      Total Bilirubin 0 99 mg/dL      eGFR 100 ml/min/1 73sq m     Narrative:      [place] guidelines for Chronic Kidney Disease (CKD):     Stage 1 with normal or high GFR (GFR > 90 mL/min/1 73 square meters)    Stage 2 Mild CKD (GFR = 60-89 mL/min/1 73 square meters)    Stage 3A Moderate CKD (GFR = 45-59 mL/min/1 73 square meters)    Stage 3B Moderate CKD (GFR = 30-44 mL/min/1 73 square meters)    Stage 4 Severe CKD (GFR = 15-29 mL/min/1 73 square meters)    Stage 5 End Stage CKD (GFR <15 mL/min/1 73 square meters)  Note: GFR calculation is accurate only with a steady state creatinine    Urine Microscopic [[id number]]  (Normal) Collected: 09/02/21 1429    Lab Status: Final result Specimen: Urine, Clean Catch Updated: 09/02/21 1507     RBC, UA 0-1 /hpf      WBC, UA 0-5 /hpf      Epithelial Cells Occasional /hpf      Bacteria, UA Occasional /hpf     Troponin I [[id number]]  (Normal) Collected: 09/02/21 1437    Lab Status: Final result Specimen: Blood from Arm, Left Updated: 09/02/21 1506     Troponin I <0 02 ng/mL     CBC and differential [[id number]]  (Abnormal) Collected: 09/02/21 1437    Lab Status: Final result Specimen: Blood from Arm, Left Updated: 09/02/21 1443     WBC 18 77 Thousand/uL      RBC 4 86 Million/uL      Hemoglobin 14 4 g/dL      Hematocrit 44 2 %      MCV 91 fL      MCH 29 6 pg      MCHC 32 6 g/dL      RDW 12 1 %      MPV 10 2 fL      Platelets 795 Thousands/uL      nRBC 0 /100 WBCs      Neutrophils Relative 87 %      Immat GRANS % 1 %      Lymphocytes Relative 6 %      Monocytes Relative 6 %      Eosinophils Relative 0 %      Basophils Relative 0 %      Neutrophils Absolute 16 34 Thousands/µL      Immature Grans Absolute 0 20 Thousand/uL      Lymphocytes Absolute 1 09 Thousands/µL      Monocytes Absolute 1 09 Thousand/µL      Eosinophils Absolute 0 02 Thousand/µL      Basophils Absolute 0 03 Thousands/µL     Urine Macroscopic, POC [[id number]]  (Abnormal) Collected: 09/02/21 1429    Lab Status: Final result Specimen: Urine Updated: 09/02/21 1430     Color, UA Eve     Clarity, UA Clear     pH, UA 6 0     Leukocytes, UA Negative     Nitrite, UA Negative     Protein,  (2+) mg/dl      Glucose,  (1/4%) mg/dl      Ketones, UA >=160 (4+) mg/dl      Urobilinogen, UA 0 2 E U /dl      Bilirubin, UA Interference- unable to analyze     Blood, UA Negative     Specific Gravity, UA >=1 030    Narrative:      [facility] RESULT                 CTA ED chest PE study   Final Result by Lorin Lizarraga MD (09/02 1725)         1  No evidence for acute pulmonary embolism  2   Ill-defined groundglass alveolar opacification in the right lower lobe with associated thickening of the walls of the subserving bronchi, some of which appear to be occluded  Findings suspicious for pneumonia with associated bronchitis and/or mucous    plugging  Atypical organisms to be considered  While not excludable, findings are not typical in appearance for Covid 19 pneumonia  Appropriate follow-up imaging advised  The study was marked in [facility] for immediate notification  Workstation performed: [id number]         XR chest 1 view portable   ED Interpretation by Melissa Balderas PA-C (09/02 1722)   No consolidation                 Procedures  ECG 12 Lead Documentation Only    Date/Time: 9/2/2021 1:59 PM  Performed by: Melissa Balderas PA-C  Authorized by: Gabriela Sosa MD     Indications / Diagnosis:  Chest Tightness, COVID +  Patient location:  ED  Previous ECG:     Previous ECG:  Compared to current    Comparison ECG info:  06/30/2014  Rate:     ECG rate:  99    ECG rate assessment: tachycardic    Rhythm:     Rhythm: sinus tachycardia    QRS:     QRS axis:  Normal  ST segments:     ST segments:  Normal  T waves:     T waves: flattening      Flattening:  III  Comments:      No acute ST or T wave changes  QT/QTc 320/410             ED Course  ED Course as of Sep 02 1756   Thu Sep 02, 2021   1701 Informed by ultrasound tech that venous duplex ultrasound is negative for DVT of the right lower extremity  She is able to visualize a few small lymph nodes in the right groin where she is having pain  SBIRT 22yo+      Most Recent Value   SBIRT (22 yo +)   In order to provide better care to our patients, we are screening all of our patients for alcohol and drug use  Would it be okay to ask you these screening questions? Yes Filed at: 09/02/2021 1338   Initial Alcohol Screen: US AUDIT-C    1  How often do you have a drink containing alcohol? 3 Filed at: 09/02/2021 1338   2  How many drinks containing alcohol do you have on a typical day you are drinking? 0 Filed at: 09/02/2021 1338   3a  Male UNDER 65: How often do you have five or more drinks on one occasion? 0 Filed at: 09/02/2021 1338   3b  FEMALE Any Age, or MALE 65+: How often do you have 4 or more drinks on one occassion? 0 Filed at: 09/02/2021 1338   Audit-C Score  3 Filed at: 09/02/2021 1338   KAROL: How many times in the past year have you       Used an illegal drug or used a prescription medication for non-medical reasons? Never Filed at: 09/02/2021 1338                    [facility]  Number of Diagnoses or Management Options  Community acquired pneumonia: new and requires workup  COVID-19: new and requires workup  Sepsis [facility]): new and requires workup  Diagnosis management comments: EKG significant for sinus tachycardia  Patient has remained persistently tachycardic in the 110-120 range  Patient does have wheezing on examination which did improve after 2 puffs of albuterol  Lab significant for leukocytosis  Chest x-ray unremarkable  CT PE study significant for ill-defined groundglass alveolar opacification in the right lower lobe with associated thickening of the walls of the subserving bronchi, some of which appear to be occluded  Findings suspicious for pneumonia with associated bronchitis and/or mucous plugging  Atypical organisms to be considered  While not excludable, findings are not typical in appearance for Covid 19 pneumonia  Appropriate follow-up imaging advised  Patient was reporting pulse ox readings in the low 90s at home  Patient does appear to be ill on examination  Will admit to the hospital for sepsis and cover for possible community-acquired pneumonia  Blood cultures and procalcitonin pending  Discussed with SLPRATIMA  We had a detailed discussion of the patient's condition and case, including need for admission   Accepts to his/her service   Bed request/bridging orders placed          Amount and/or Complexity of Data Reviewed  Clinical lab tests: reviewed and ordered  Tests in the radiology section of CPT®: ordered and reviewed  Review and summarize past medical records: yes  Discuss the patient with other providers: yes (SLIM)    Patient Progress  Patient progress: stable      Disposition  Final diagnoses:   Sepsis ([address] )   COVID-19   Community acquired pneumonia     Time reflects when diagnosis was documented in both MDM as applicable and the Disposition within this note     Time User Action Codes Description Comment    9/2/2021  5:35 PM Mary Lin Add [A41 9] Sepsis ([address] )     9/2/2021  5:35 PM Mary Lin Add [U07 1] COVID-19     9/2/2021  5:35 PM [address] [place] [address], [facility] [J18 9] Community acquired pneumonia       ED Disposition     ED Disposition Condition Date/Time Comment    Admit Stable Thu Sep 2, 2021  5:36 PM Case was discussed with SL Resident and the patient's admission status was agreed to be Admission Status: inpatient status to the service of Dr Dar Velasquez  Follow-up Information    None         Patient's Medications   Discharge Prescriptions    No medications on file     No discharge procedures on file      PDMP Review       Value Time User    PDMP Reviewed  Yes 3/3/2020  9:35 PM Pamela Anglin MD          ED Provider  Electronically Signed by           Nima Reich PA-C  09/02/21 2962

## 2021-09-02 NOTE — PROGRESS NOTES
Virtual Regular Visit    Verification of patient location:    Patient is located in the following state in which I hold an active license [place]      Assessment/Plan:    COVID viral infection- due to persistent cough will order chest x-ray although this is likely an asthmatic bronchitis  check chest x-ray to determine need for antibiotics  Will send in albuterol inhaler  Problem List Items Addressed This Visit     None      Visit Diagnoses     Cough    -  Primary    Relevant Orders    XR chest pa & lateral    Suspected COVID-19 virus infection        Lab test positive for detection of COVID-19 virus        Relevant Orders    XR chest pa & lateral               Reason for visit is   Chief Complaint   Patient presents with    COVID-19     c/o chest tighness        Encounter provider Sally Álvarez MD    Provider located at [address] [place] [id number]      Recent Visits  Date Type Provider Dept   08/30/21 Telephone Lina Shaw [facility]   08/27/21 Telephone Mirlande Alvarez [facility]   Showing recent visits within past 7 days and meeting all other requirements  Today's Visits  Date Type Provider Dept   09/01/21 Telemedicine Sally Álvarez MD [facility]   09/01/21 Telephone Lina Shaw [facility]   Showing today's visits and meeting all other requirements  Future Appointments  No visits were found meeting these conditions  Showing future appointments within next 150 days and meeting all other requirements       The patient was identified by name and date of birth  Nesha Rawlssamsonatif was informed that this is a telemedicine visit and that the visit is being conducted through [address] [place] [address] and patient was informed that this is not a secure, HIPAA-compliant platform  She agrees to proceed     My office door was closed  No one else was in the room    She acknowledged consent and understanding of privacy and security of the video platform  The patient has agreed to participate and understands they can discontinue the visit at any time  Patient is aware this is a billable service  Aaliyah Madrid is a 37 y o  female   Who was seen by tele visit for follow-up COVID infection  She started on 08/23 with nasal congestion sore throat headache  She felt like she was starting to improve  Recently she has lost her sense of taste and smell  She has had some nausea and fatigue  Her cough is dry and a little bit worse  No shortness of breath  No fever or chills  Chest does feel tight  She has a history of asthma with respiratory infections  No diarrhea or vomiting  She quit smoking 3 years ago  Donna Lightning HPI     Past Medical History:   Diagnosis Date    Celiac disease     Diabetes mellitus ([facility] ) 08/16/2021    IDDM, uses insulin pump    Encounter for smoking cessation counseling     GERD (gastroesophageal reflux disease)     Hypertension     Type 1 diabetes ([facility] )        Past Surgical History:   Procedure Laterality Date    CERVICAL BIOPSY  W/ LOOP ELECTRODE EXCISION      COLONOSCOPY      UPPER GASTROINTESTINAL ENDOSCOPY      WISDOM TOOTH EXTRACTION         Current Outpatient Medications   Medication Sig Dispense Refill    Cholecalciferol 50 MCG (2000 UT) CAPS Take 1 capsule by mouth      clobetasol (TEMOVATE) 0 05 % cream Apply TWICE DAILY TO HANDS FOR 2 WEEKS max   Can REPEAT AFTER a 1-2 WEEK break INFECTION needed      CONTOUR NEXT TEST test strip TESTING EIGHT TIMES DAILY      cyanocobalamin (VITAMIN B-12) 100 MCG tablet Take 100 mcg by mouth daily      fluconazole (DIFLUCAN) 150 mg tablet as needed   3    Glucagon (Baqsimi One Pack) 3 MG/DOSE POWD 3 mg      glucagon (GLUCAGON EMERGENCY) 1 MG injection Inject as directed      insulin aspart, w/niacinamide, (FIASP) 100 Units/mL injection pen FOR PUMP FAILURE 40 UNITS DAILY      insulin detemir (LEVEMIR) 100 units/mL subcutaneous injection Inject under the skin daily at bedtime      Ketone Blood Test STRP by In Vitro route      levonorgestrel (MIRENA) 20 MCG/24HR IUD 20 mcg/day      NOVOLOG 100 UNIT/ML injection USE UP TO 50 UNITS VIA PUMP DAILY 50 mL 0    PATIENT MAINTAINED INSULIN PUMP CHANGE EVERY 3 DAYS AS DIRECTED      Urine Glucose-Ketones Test STRP Check urine ketones when blood sugar >300, if +ve call  strip 3     No current facility-administered medications for this visit  No Known Allergies    Review of Systems    Video Exam    There were no vitals filed for this visit  Physical Exam     She looks well  Does not appear ill  Does have a harsh sounding cough  I spent 10 minutes directly with the patient during this visit    VIRTUAL VISIT DISCLAIMER      Chey Lloyd verbally agrees to participate in [employer]  Pt is aware that [employer] could be limited without vital signs or the ability to perform a full hands-on physical Arline Amaro understands she or the provider may request at any time to terminate the video visit and request the patient to seek care or treatment in person

## 2021-09-03 PROBLEM — R19.7 DIARRHEA: Status: ACTIVE | Noted: 2021-09-03

## 2021-09-03 LAB
ALBUMIN SERPL BCP-MCNC: 3.1 G/DL (ref 3.5–5)
ALP SERPL-CCNC: 88 U/L (ref 46–116)
ALT SERPL W P-5'-P-CCNC: 15 U/L (ref 12–78)
ANION GAP SERPL CALCULATED.3IONS-SCNC: 9 MMOL/L (ref 4–13)
AST SERPL W P-5'-P-CCNC: 11 U/L (ref 5–45)
ATRIAL RATE: 99 BPM
BASOPHILS # BLD AUTO: 0.03 THOUSANDS/ΜL (ref 0–0.1)
BASOPHILS NFR BLD AUTO: 0 % (ref 0–1)
BILIRUB SERPL-MCNC: 0.62 MG/DL (ref 0.2–1)
BUN SERPL-MCNC: 6 MG/DL (ref 5–25)
CALCIUM ALBUM COR SERPL-MCNC: 9.4 MG/DL (ref 8.3–10.1)
CALCIUM SERPL-MCNC: 8.7 MG/DL (ref 8.3–10.1)
CHLORIDE SERPL-SCNC: 103 MMOL/L (ref 100–108)
CO2 SERPL-SCNC: 26 MMOL/L (ref 21–32)
CREAT SERPL-MCNC: 0.59 MG/DL (ref 0.6–1.3)
EOSINOPHIL # BLD AUTO: 0.15 THOUSAND/ΜL (ref 0–0.61)
EOSINOPHIL NFR BLD AUTO: 1 % (ref 0–6)
ERYTHROCYTE [DISTWIDTH] IN BLOOD BY AUTOMATED COUNT: 12.2 % (ref 11.6–15.1)
GFR SERPL CREATININE-BSD FRML MDRD: 115 ML/MIN/1.73SQ M
GLUCOSE SERPL-MCNC: 192 MG/DL (ref 65–140)
HCT VFR BLD AUTO: 40.3 % (ref 34.8–46.1)
HGB BLD-MCNC: 13.3 G/DL (ref 11.5–15.4)
IMM GRANULOCYTES # BLD AUTO: 0.17 THOUSAND/UL (ref 0–0.2)
IMM GRANULOCYTES NFR BLD AUTO: 1 % (ref 0–2)
LYMPHOCYTES # BLD AUTO: 1.29 THOUSANDS/ΜL (ref 0.6–4.47)
LYMPHOCYTES NFR BLD AUTO: 9 % (ref 14–44)
MCH RBC QN AUTO: 29.9 PG (ref 26.8–34.3)
MCHC RBC AUTO-ENTMCNC: 33 G/DL (ref 31.4–37.4)
MCV RBC AUTO: 91 FL (ref 82–98)
MONOCYTES # BLD AUTO: 1.14 THOUSAND/ΜL (ref 0.17–1.22)
MONOCYTES NFR BLD AUTO: 8 % (ref 4–12)
NEUTROPHILS # BLD AUTO: 11.74 THOUSANDS/ΜL (ref 1.85–7.62)
NEUTS SEG NFR BLD AUTO: 81 % (ref 43–75)
NRBC BLD AUTO-RTO: 0 /100 WBCS
P AXIS: 65 DEGREES
PLATELET # BLD AUTO: 215 THOUSANDS/UL (ref 149–390)
PMV BLD AUTO: 10.4 FL (ref 8.9–12.7)
POTASSIUM SERPL-SCNC: 3.9 MMOL/L (ref 3.5–5.3)
PR INTERVAL: 134 MS
PROCALCITONIN SERPL-MCNC: 0.38 NG/ML
PROT SERPL-MCNC: 7 G/DL (ref 6.4–8.2)
QRS AXIS: 59 DEGREES
QRSD INTERVAL: 80 MS
QT INTERVAL: 320 MS
QTC INTERVAL: 410 MS
RBC # BLD AUTO: 4.45 MILLION/UL (ref 3.81–5.12)
SODIUM SERPL-SCNC: 138 MMOL/L (ref 136–145)
T WAVE AXIS: 39 DEGREES
VENTRICULAR RATE: 99 BPM
WBC # BLD AUTO: 14.52 THOUSAND/UL (ref 4.31–10.16)

## 2021-09-03 PROCEDURE — 99232 SBSQ HOSP IP/OBS MODERATE 35: CPT | Performed by: INTERNAL MEDICINE

## 2021-09-03 PROCEDURE — 84145 PROCALCITONIN (PCT): CPT | Performed by: PHYSICIAN ASSISTANT

## 2021-09-03 PROCEDURE — 80053 COMPREHEN METABOLIC PANEL: CPT

## 2021-09-03 PROCEDURE — 85025 COMPLETE CBC W/AUTO DIFF WBC: CPT

## 2021-09-03 PROCEDURE — 93010 ELECTROCARDIOGRAM REPORT: CPT | Performed by: INTERNAL MEDICINE

## 2021-09-03 RX ORDER — CEFDINIR 300 MG/1
300 CAPSULE ORAL EVERY 12 HOURS SCHEDULED
Qty: 10 CAPSULE | Refills: 0 | Status: CANCELLED | OUTPATIENT
Start: 2021-09-03 | End: 2021-09-08

## 2021-09-03 RX ADMIN — BENZONATATE 200 MG: 100 CAPSULE ORAL at 09:53

## 2021-09-03 RX ADMIN — IBUPROFEN 600 MG: 600 TABLET ORAL at 21:50

## 2021-09-03 RX ADMIN — IBUPROFEN 600 MG: 600 TABLET ORAL at 09:53

## 2021-09-03 RX ADMIN — GUAIFENESIN 1200 MG: 600 TABLET, EXTENDED RELEASE ORAL at 20:40

## 2021-09-03 RX ADMIN — GUAIFENESIN 1200 MG: 600 TABLET, EXTENDED RELEASE ORAL at 09:53

## 2021-09-03 RX ADMIN — IBUPROFEN 600 MG: 600 TABLET ORAL at 17:18

## 2021-09-03 RX ADMIN — CEFTRIAXONE 1000 MG: 10 INJECTION, POWDER, FOR SOLUTION INTRAVENOUS at 09:54

## 2021-09-03 RX ADMIN — AZITHROMYCIN MONOHYDRATE 500 MG: 250 TABLET ORAL at 20:39

## 2021-09-03 RX ADMIN — ENOXAPARIN SODIUM 40 MG: 40 INJECTION SUBCUTANEOUS at 09:53

## 2021-09-03 RX ADMIN — LOPERAMIDE HYDROCHLORIDE 2 MG: 2 CAPSULE ORAL at 01:14

## 2021-09-03 NOTE — ASSESSMENT & PLAN NOTE
Fully vaccinated pt with multiple sick contacts (mom, daughter), also fully vaccinated  x10 days mild symptoms  Nasal congestion, rhinorrhea, sore throat at outset, now resolved  Continues to have non-productive cough and test tightness with deep inspiration  HA, myalgias, arthralgias for last few days  Endorses fatigue, decreased activity, decreased taste and smell, decreased appetite with poor PO intake  Nausea without vomiting  Some loose stools, but no watery diarrhea  Denies fever, chills, SOB  Earlier today, upon standing, experienced one episode of sudden-onset diaphoresis, pallor, nausea, lightheadedness, with home pulse-ox demonstrating O2 sat 90%   Pt not currently requiring supplemental O2, satting 96-99% RA   CRP elevated 110; procal and BNP ordered    Plan:  -F/u procal, BNP  -Daily CBC with diff, CMP, per COVID protocol  -If pt develops O2 requirement, start remdesivir, dexamethasone, systemic therapeutic anticoagulation per Covid protocol

## 2021-09-03 NOTE — ASSESSMENT & PLAN NOTE
Pt with recent diagnosis of COVID-19, fully vaccinated, experiencing mild symptoms x10 days  Endorses non-productive cough and feeling of chest tightness with deep respiration  Denies subjective SOB and satting well on room air (96-99%)  On admission found to have leukocytosis with left shift (WBC 18 7; 87% neutrophils) and RLL ground glass opacities with suspicion for pneumonia with bronchitis and/or mucus plugging on CT Chest  Wheezing and rales on auscultation, most prominent in R lower lung fields  CTA ED chest PE study 9/2/2021: 1  No evidence for acute pulmonary embolism  2   Ill-defined groundglass alveolar opacification in the right lower lobe with associated thickening of the walls of the subserving bronchi, some of which appear to be occluded  Findings suspicious for pneumonia with associated bronchitis and/or mucous  plugging  Atypical organisms to be considered  While not excludable, findings are not typical in appearance for Covid 19 pneumonia  Given CT findings and pt's vaccination status, suspect this is more likely bacterial pneumonia secondary to viral URI with COVID-19 than it is due to a primary COVID-19 pneumonia      Plan:  -sputum Cx if it can be obtained, though unlikely considering non-productive cough  -f/u BCx x2 sets drawn in ED  -Urine antigens: Strep, Legionella  -Monitor O2 sats and temp  -Supplement O2 as needed to maintain sats >89%  -Ceftriaxone 1 g IV q24h  -Azithromycin 500 mg q24h  -Mucinex 1200 mg BID  -Tessalon Perles prn for cough  Albuterol inhaler prn for wheezing  -Tylenol prn for fever, HA, mild pain  -Incentive spirometry  -Flutter valve  -repeat CXR in 4-6 weeks for resolution

## 2021-09-03 NOTE — ASSESSMENT & PLAN NOTE
Fully vaccinated pt with multiple sick contacts (mom, daughter), also fully vaccinated  x10 days mild symptoms  Nasal congestion, rhinorrhea, sore throat at outset, now resolved  Continues to have non-productive cough and test tightness with deep inspiration  HA, myalgias, arthralgias for last few days  Endorses fatigue, decreased activity, decreased taste and smell, decreased appetite with poor PO intake  Nausea without vomiting  Some loose stools, but no watery diarrhea  Denies fever, chills, SOB  Earlier today, upon standing, experienced one episode of sudden-onset diaphoresis, pallor, nausea, lightheadedness, with home pulse-ox demonstrating O2 sat 90%   Pt not currently requiring supplemental O2, satting 96-99% RA   CRP elevated 147 9 (yesterday 110 8 09/03); procal: 0 28 (<-0 38 yesterday 09/03)  Lab Results   Component Value Date     9 (H) 09/04/2021     Lab Results   Component Value Date    WBC 14 09 (H) 09/04/2021        there is a strong suspicion that COVID virus infection is still a play due to elevated procalcitonin, and CRP    Plan:  - Continue to trend procal (Pro-jerardo: 0 28<- 0 38 <-0 32)  - Still maintains leukocytosis at 14 09 (  Yesterday 09/03 was 14 52)  -Daily CBC with diff, CMP, per COVID protocol  -If pt develops O2 requirement, start remdesivir, dexamethasone, systemic therapeutic anticoagulation per Covid protocol

## 2021-09-03 NOTE — ASSESSMENT & PLAN NOTE
POA: pt meeting SIRS criteria by tachycardia (116), RR (22), and leukocytosis (WBC 18 7) with confirmed COVID-19 infection and suspected bacterial pneumonia based on physical exam, labs, imaging studies  Pt is hemodynamically stable (100/56 - 132/75) and maintaining O2 sats 96-99% on RA without increased work of breathing and normal RR at time of admission exam  Afebrile in the ED and without subjective fevers or chills at home  Suspect tachycardia may be appropriately compensatory in the setting of hypovolemia due to recent poor PO intake      Pending Blood cx, sputum cx  Procal: 0 38 (yesterday 09/02 0 32)    Plan:  -abx as addressed in Community Acquired Pneumonia  -Diabetic Diet w/ SSI, Encourage PO intake  - procal mildly elevated and cont to trend  -Tylenol prn for fever

## 2021-09-03 NOTE — DISCHARGE INSTR - AVS FIRST PAGE
Dear Bertha Bell,     It was our pleasure to care for you here at [facility]  It is our hope that we were always able to exceed the expected standards for your care during your stay  You were hospitalized due to pneumonia superimposed after COVID infection  You were cared for on the  floor by Angelica Mott DO under the service of Diana Pulido with [facility] who covers for your primary care physician (PCP), [place] Oscar MD, while you were hospitalized  If you have any questions or concerns related to this hospitalization, you may contact us at [phone]  For follow up as well as any medication refills, we recommend that you follow up with your primary care physician  A registered nurse will reach out to you by phone within a few days after your discharge to answer any additional questions that you may have after going home    However, at this time we provide for you here, the most important instructions / recommendations at discharge:     · Notable Medication Adjustments -   · Cefdinir 300 mg twice a day for 3 days after discharge for a total of 7 days of antibiotics  · Azithromycin 500 mg daily for 3 days after discharge for a total of 7 days of antibiotics  · Albuterol inhaler as needed for cough/ wheezing  ·  Tessalon Perles for cough medications/anti-cough  · Testing Required after Discharge -   · None  · Important follow up information -   · With your PCP to get a chest x-ray 8 weeks from discharge  · Other Instructions -   · Please come back to the ED if symptoms severely worsen, experience chest pain, or shortness of breath  · Please quarantine for at least 10 days due to mild COVID infection  · Please review this entire after visit summary as additional general instructions including medication list, appointments, activity, diet, any pertinent wound care, and other additional recommendations from your care team that may be provided for you  Sincerely,     Jose Guidry, DO      COVID-19 Home Care Guidelines    Your healthcare provider and/or public health staff have evaluated you and have determined that you do not need to remain in the hospital at this time  At this time you can be isolated at home where you will be monitored by staff from your local or state health department  You should carefully follow the prevention and isolation steps below until a healthcare provider or local or state health department says that you can return to your normal activities  Stay home except to get medical care    People who are mildly ill with COVID-19 are able to isolate at home during their illness  You should restrict activities outside your home, except for getting medical care  Do not go to work, school, or public areas  Avoid using public transportation, ride-sharing, or taxis  Separate yourself from other people and animals in your home    People: As much as possible, you should stay in a specific room and away from other people in your home  Also, you should use a separate bathroom, if available  Animals: You should restrict contact with pets and other animals while you are sick with COVID-19, just like you would around other people  Although there have not been reports of pets or other animals becoming sick with COVID-19, it is still recommended that people sick with COVID-19 limit contact with animals until more information is known about the virus  When possible, have another member of your household care for your animals while you are sick  If you are sick with COVID-19, avoid contact with your pet, including petting, snuggling, being kissed or licked, and sharing food  If you must care for your pet or be around animals while you are sick, wash your hands before and after you interact with pets and wear a facemask  See COVID-19 and Animals for more information      Call ahead before visiting your doctor    If you have a medical appointment, call the healthcare provider and tell them that you have or may have COVID-19  This will help the healthcare providers office take steps to keep other people from getting infected or exposed  Wear a facemask    You should wear a facemask when you are around other people (e g , sharing a room or vehicle) or pets and before you enter a healthcare providers office  If you are not able to wear a facemask (for example, because it causes trouble breathing), then people who live with you should not stay in the same room with you, or they should wear a facemask if they enter your room  Cover your coughs and sneezes    Cover your mouth and nose with a tissue when you cough or sneeze  Throw used tissues in a lined trash can  Immediately wash your hands with soap and water for at least 20 seconds or, if soap and water are not available, clean your hands with an alcohol-based hand  that contains at least 60% alcohol  Clean your hands often    Wash your hands often with soap and water for at least 20 seconds, especially after blowing your nose, coughing, or sneezing; going to the bathroom; and before eating or preparing food  If soap and water are not readily available, use an alcohol-based hand  with at least 60% alcohol, covering all surfaces of your hands and rubbing them together until they feel dry  Soap and water are the best option if hands are visibly dirty  Avoid touching your eyes, nose, and mouth with unwashed hands  Avoid sharing personal household items    You should not share dishes, drinking glasses, cups, eating utensils, towels, or bedding with other people or pets in your home  After using these items, they should be washed thoroughly with soap and water  Clean all high-touch surfaces everyday    High touch surfaces include counters, tabletops, doorknobs, bathroom fixtures, toilets, phones, keyboards, tablets, and bedside tables  Also, clean any surfaces that may have blood, stool, or body fluids on them  Use a household cleaning spray or wipe, according to the label instructions  Labels contain instructions for safe and effective use of the cleaning product including precautions you should take when applying the product, such as wearing gloves and making sure you have good ventilation during use of the product  Monitor your symptoms    Seek prompt medical attention if your illness is worsening (e g , difficulty breathing)  Before seeking care, call your healthcare provider and tell them that you have, or are being evaluated for, COVID-19  Put on a facemask before you enter the facility  These steps will help the healthcare providers office to keep other people in the office or waiting room from getting infected or exposed  Ask your healthcare provider to call the local or [facility] health department  Persons who are placed under active monitoring or facilitated self-monitoring should follow instructions provided by their local health department or occupational health professionals, as appropriate  If you have a medical emergency and need to call 911, notify the dispatch personnel that you have, or are being evaluated for COVID-19  If possible, put on a facemask before emergency medical services arrive      Discontinuing home isolation    Patients with confirmed COVID-19 should remain under home isolation precautions until the following conditions are met:   - They have had no fever for at least 24 hours (that is one full day of no fever without the use medicine that reduces fevers)  AND  - other symptoms have improved (for example, when their cough or shortness of breath have improved)  AND  - If had mild or moderate illness, at least 10 days have passed since their symptoms first appeared or if severe illness (needed oxygen) or immunosuppressed, at least 20 days have passed since symptoms first appeared  Patients with confirmed COVID-19 should also notify close contacts (including their workplace) and ask that they self-quarantine  Currently, close contact is defined as being within 6 feet for 15 minutes or more from the period 24 hours starting 48 hours before symptom onset to the time at which the patient went into isolation  Close contacts of patients diagnosed with COVID-19 should be instructed by the patient to self-quarantine for 14 days from the last time of their last contact with the patient       Source: [employer]

## 2021-09-03 NOTE — ASSESSMENT & PLAN NOTE
Pt with recent diagnosis of COVID-19, fully vaccinated, experiencing mild symptoms x10 days  Endorses non-productive cough and feeling of chest tightness with deep respiration  Denies subjective SOB and satting well on room air (96-99%)  On admission found to have leukocytosis with left shift (WBC 18 7; 87% neutrophils) and RLL ground glass opacities with suspicion for pneumonia with bronchitis and/or mucus plugging on CT Chest  Wheezing and rales on auscultation, most prominent in R lower lung fields  CTA ED chest PE study 9/2/2021: 1  No evidence for acute pulmonary embolism  2   Ill-defined groundglass alveolar opacification in the right lower lobe with associated thickening of the walls of the subserving bronchi, some of which appear to be occluded  Findings suspicious for pneumonia with associated bronchitis and/or mucous  plugging  Atypical organisms to be considered  While not excludable, findings are not typical in appearance for Covid 19 pneumonia  Given CT findings and pt's vaccination status, suspect this is more likely bacterial pneumonia secondary to viral URI with COVID-19 than it is due to a primary COVID-19 pneumonia    CXR read No acute cardiopulmonary disease    Plan:  -sputum Cx if it can be obtained, though unlikely considering non-productive cough  -pending blood cx, Urine antigens: Strep, Legionella  -Monitor O2 sats and temp  -Supplement O2 as needed to maintain sats >89%  -Ceftriaxone 1 g IV q24h and Azithromycin 500 mg q24h  -Mucinex 1200 mg BID, Tessalon Perles prn for cough, Albuterol inhaler prn for wheezing  -Tylenol prn for fever, HA, mild pain  -Incentive spirometry and Flutter valve  -repeat CXR in 4-6 weeks for resolution

## 2021-09-03 NOTE — ASSESSMENT & PLAN NOTE
Pt with recent diagnosis of COVID-19, fully vaccinated, experiencing mild symptoms x10 days  Endorses non-productive cough and feeling of chest tightness with deep respiration  Denies subjective SOB and satting well on room air (96-99%)  On admission found to have leukocytosis with left shift (WBC 18 7; 87% neutrophils) and RLL ground glass opacities with suspicion for pneumonia with bronchitis and/or mucus plugging on CT Chest  Wheezing and rales on auscultation, most prominent in R lower lung fields  CTA ED chest PE study 9/2/2021: 1  No evidence for acute pulmonary embolism  2   Ill-defined groundglass alveolar opacification in the right lower lobe with associated thickening of the walls of the subserving bronchi, some of which appear to be occluded  Findings suspicious for pneumonia with associated bronchitis and/or mucous  plugging  Atypical organisms to be considered  While not excludable, findings are not typical in appearance for Covid 19 pneumonia  Given CT findings and pt's vaccination status, suspect this is more likely bacterial pneumonia secondary to viral URI with COVID-19 than it is due to a primary COVID-19 pneumonia    CXR read No acute cardiopulmonary disease  Negative blood cultures    Plan:  -sputum Cx if it can be obtained, though unlikely considering non-productive cough  -pending Urine antigens: Strep, Legionella  -Monitor O2 sats and temp  -Supplement O2 as needed to maintain sats >89%  -Ceftriaxone 1 g IV q24h (day 3) and Azithromycin 500 mg q24h (day 3)  -Mucinex 1200 mg BID, Tessalon Perles prn for cough, Albuterol inhaler prn for wheezing  -Tylenol prn for fever, HA, mild pain  -Incentive spirometry and Flutter valve  -repeat CXR in 4-6 weeks for resolution

## 2021-09-03 NOTE — ASSESSMENT & PLAN NOTE
Patient noted that she had about 4-5 loose bowel movements over the last 24 hours 09/03  She says they were very loose and brown      09/04  Patient is morning she had not had a bowel movement since  Midnight 09/03  So no stool sample was able to be collected  But otherwise no bowel movements or diarrhea    - Cdiff PCR ordered and pending

## 2021-09-03 NOTE — ASSESSMENT & PLAN NOTE
POA: pt meeting SIRS criteria by tachycardia (116), RR (22), and leukocytosis (WBC 18 7) with confirmed COVID-19 infection and suspected bacterial pneumonia based on physical exam, labs, imaging studies  Pt is hemodynamically stable (100/56 - 132/75) and maintaining O2 sats 96-99% on RA without increased work of breathing and normal RR at time of admission exam  Afebrile in the ED and without subjective fevers or chills at home  Suspect tachycardia may be appropriately compensatory in the setting of hypovolemia due to recent poor PO intake      Plan:  -abx as addressed in Community Acquired Pneumonia  -Diabetic Diet, Encourage PO intake  -f/u procal w reflex, AM CBC with diff, BCx x2 sets (collected), sputum Cx (ordered)  -Tylenol prn for fever

## 2021-09-03 NOTE — H&P
[address] 1980, 37 y o  female MRN: [id number]  Unit/Bed#: S -01 Encounter: [id number]  Primary Care Provider: Alexis Lancaster MD   Date and time admitted to hospital: 9/2/2021  1:32 PM    CAP (community acquired pneumonia)  Assessment & Plan  Pt with recent diagnosis of COVID-19, fully vaccinated, experiencing mild symptoms x10 days  Endorses non-productive cough and feeling of chest tightness with deep respiration  Denies subjective SOB and satting well on room air (96-99%)  On admission found to have leukocytosis with left shift (WBC 18 7; 87% neutrophils) and RLL ground glass opacities with suspicion for pneumonia with bronchitis and/or mucus plugging on CT Chest  Wheezing and rales on auscultation, most prominent in R lower lung fields  CTA ED chest PE study 9/2/2021: 1  No evidence for acute pulmonary embolism  2   Ill-defined groundglass alveolar opacification in the right lower lobe with associated thickening of the walls of the subserving bronchi, some of which appear to be occluded  Findings suspicious for pneumonia with associated bronchitis and/or mucous  plugging  Atypical organisms to be considered  While not excludable, findings are not typical in appearance for Covid 19 pneumonia  Given CT findings and pt's vaccination status, suspect this is more likely bacterial pneumonia secondary to viral URI with COVID-19 than it is due to a primary COVID-19 pneumonia      Plan:  -sputum Cx if it can be obtained, though unlikely considering non-productive cough  -f/u BCx x2 sets drawn in ED  -Urine antigens: Strep, Legionella  -Monitor O2 sats and temp  -Supplement O2 as needed to maintain sats >89%  -Ceftriaxone 1 g IV q24h  -Azithromycin 500 mg q24h  -Mucinex 1200 mg BID  -Tessalon Perles prn for cough  Albuterol inhaler prn for wheezing  -Tylenol prn for fever, HA, mild pain  -Incentive spirometry  -Flutter valve  -repeat CXR in 4-6 weeks for resolution    * Sepsis ([facility] [address] )  Assessment & Plan  POA: pt meeting SIRS criteria by tachycardia (116), RR (22), and leukocytosis (WBC 18 7) with confirmed COVID-19 infection and suspected bacterial pneumonia based on physical exam, labs, imaging studies  Pt is hemodynamically stable (100/56 - 132/75) and maintaining O2 sats 96-99% on RA without increased work of breathing and normal RR at time of admission exam  Afebrile in the ED and without subjective fevers or chills at home  Suspect tachycardia may be appropriately compensatory in the setting of hypovolemia due to recent poor PO intake  Plan:  -abx as addressed in Community Acquired Pneumonia  -Diabetic Diet, Encourage PO intake  -f/u procal w reflex, AM CBC with diff, BCx x2 sets (collected), sputum Cx (ordered)  -Tylenol prn for fever    COVID-19 virus infection with superimposed bacterial pneumonia   Assessment & Plan  Fully vaccinated pt with multiple sick contacts (mom, daughter), also fully vaccinated  x10 days mild symptoms  Nasal congestion, rhinorrhea, sore throat at outset, now resolved  Continues to have non-productive cough and test tightness with deep inspiration  HA, myalgias, arthralgias for last few days  Endorses fatigue, decreased activity, decreased taste and smell, decreased appetite with poor PO intake  Nausea without vomiting  Some loose stools, but no watery diarrhea  Denies fever, chills, SOB  Earlier today, upon standing, experienced one episode of sudden-onset diaphoresis, pallor, nausea, lightheadedness, with home pulse-ox demonstrating O2 sat 90%   Pt not currently requiring supplemental O2, satting 96-99% RA   CRP elevated 110; procal and BNP ordered    Plan:  -F/u procal, BNP  -Daily CBC with diff, CMP, per COVID protocol  -If pt develops O2 requirement, start remdesivir, dexamethasone, systemic therapeutic anticoagulation per Covid protocol    Type 1 diabetes mellitus with hyperglycemia, with long-term current use of insulin [facility])  Assessment & Plan  Lab Results   Component Value Date    HGBA1C 9 4 (H) 06/01/2021     Pt diagnosed with Type I DM at age 29  Uses pt-maintained pump with Novolog for glucose control  Reports that sugars on day of presentation have been around 250, which is not atypical      Lab Results   Component Value Date    GLUC 226 (H) 09/02/2021     -Continue pt-maintained insulin pump during admission  -SSI TID with meals and QHS  -monitor blood glucose and adjust as needed  -Avoid hypoglycemia and treat per protocol      VTE Pharmacologic Prophylaxis: VTE Score: 3 Moderate Risk (Score 3-4) - Pharmacological DVT Prophylaxis Ordered: enoxaparin (Lovenox)  Code Status: Level 1 - Full Code as discussed with patient at bedside  Discussion with family: patient would like Gadiel Zhang (mother) to be called for updates at [phone]  Call deferred at this time owing to late hour  Lynita Ped Anticipated Length of Stay: Patient will be admitted on an inpatient basis with an anticipated length of stay of greater than 2 midnights secondary to sepsis with COVID infection and suspected bacterial pneumonia  Chief Complaint: HA with myalgias/arthralgias    History of Present Illness:  Bud Leonardo is a 37 y o  female with a PMH of Type I DM, celiac dz, GERD and recent diagnosis with COVID-19 infection (fully vaccinated, mild symptoms) who presents with CC of HA with arthralgias and myalgias following a single episode earlier today of sudden-onset diaphoresis with nausea, pallor, lightheadedness--O2 sat measured at home at this time 90%--episode lasted 2-3 minutes  Denies subjective SOB  Glucose controlled with insulin pump, and pt states that gluc has been approx 250 throughout the day, not uncommon for her   Has been experiencing mild covid symptoms x10 days--started with nasal congestion, rhinorrhea, sore throat, cough, chest tightness with deep inspiration; congestion, rhinorrhea, sore throat have resolved; nonproductive cough and chest tightness persist   For the last few days, patient has also developed headache, myalgias, arthralgias  No fevers or chills  Nausea but no vomiting  No watery diarrhea, but does endorse multiple loose stools  Decreased energy and activity, decreased sense of smell and taste, decreased appetite with poor PO intake and wt loss of a few pounds  In the ED, afebrile, normal sinus tachycardia, tachypnea, maintaining sats 96-99% on RA, found to have leukocytosis with left shift and RLL ground glass opacities on CT Chest  Given ceftriaxone IV once  Review of Systems:  Review of Systems   Constitutional: Positive for activity change (decreased), appetite change (decreased), chills, diaphoresis, fatigue and unexpected weight change (lost a couple of pounds in recent days with poor PO intake)  Negative for fever  HENT: Positive for congestion, rhinorrhea, sore throat and voice change (relatesd to coughing)  Negative for ear pain, hearing loss and trouble swallowing  Eyes: Negative for photophobia, pain, discharge, itching and visual disturbance  Respiratory: Positive for cough, chest tightness (deep breathing) and wheezing  Negative for choking and shortness of breath  Cardiovascular: Negative for chest pain and leg swelling  Gastrointestinal: Positive for diarrhea and nausea  Negative for abdominal pain, constipation and vomiting  Genitourinary: Negative for dysuria  Musculoskeletal: Positive for arthralgias and myalgias  Skin: Positive for pallor (1 brief episode day of presentation)  Allergic/Immunologic: Negative for environmental allergies and food allergies  Neurological: Positive for light-headedness (one brief episode day of presentation) and headaches  Negative for dizziness and numbness         Past Medical and Surgical History:   Past Medical History:   Diagnosis Date    Celiac disease     Diabetes mellitus ([facility] [address] ) 08/16/2021    IDDM, uses insulin pump    Encounter for smoking cessation counseling     GERD (gastroesophageal reflux disease)     Hypertension     during pregnancy    Type 1 diabetes (HCC)        Past Surgical History:   Procedure Laterality Date    CERVICAL BIOPSY  W/ LOOP ELECTRODE EXCISION      COLONOSCOPY      UPPER GASTROINTESTINAL ENDOSCOPY      WISDOM TOOTH EXTRACTION         Meds/Allergies:  Prior to Admission medications    Medication Sig Start Date End Date Taking? Authorizing Provider   Cholecalciferol 50 MCG (2000 UT) CAPS Take 1 capsule by mouth   Yes Historical Provider, MD   CONTOUR NEXT TEST test strip TESTING EIGHT TIMES DAILY 3/23/20  Yes Historical Provider, MD   cyanocobalamin (VITAMIN B-12) 100 MCG tablet Take 100 mcg by mouth daily   Yes Historical Provider, MD   Ketone Blood Test STRP by In Vitro route 3/21/15  Yes Historical Provider, MD   levonorgestrel (MIRENA) 20 MCG/24HR IUD 20 mcg/day 5/11/11  Yes Historical Provider, MD   NOVOLOG 100 UNIT/ML injection USE UP TO 50 UNITS VIA PUMP DAILY 1/24/20  Yes Matthew Ga MD   PATIENT MAINTAINED INSULIN PUMP CHANGE EVERY 3 DAYS AS DIRECTED 7/8/21  Yes Historical Provider, MD   Urine Glucose-Ketones Test STRP Check urine ketones when blood sugar >300, if +ve call MD 2/12/18  Yes Matthew Ga MD   clobetasol (TEMOVATE) 0 05 % cream Apply TWICE DAILY TO HANDS FOR 2 WEEKS max   Can REPEAT AFTER a 1-2 WEEK break INFECTION needed  Patient not taking: Reported on 9/2/2021 4/9/20   Historical Provider, MD   fluconazole (DIFLUCAN) 150 mg tablet as needed   Patient not taking: Reported on 9/2/2021 9/13/19   Historical Provider, MD   Glucagon (Baqsimi One Pack) 3 MG/DOSE POWD 3 mg 6/2/21   Historical Provider, MD   glucagon (GLUCAGON EMERGENCY) 1 MG injection Inject as directed 1/21/15   Historical Provider, MD   insulin aspart, w/niacinamide, (FIASP) 100 Units/mL injection pen FOR PUMP FAILURE 40 UNITS DAILY 3/13/21   Historical Provider, MD   insulin detemir (LEVEMIR) 100 units/mL subcutaneous injection Inject under the skin daily at bedtime    Historical Provider, MD     I have reviewed home medications with patient personally  Allergies: No Known Allergies    Social History:  Marital Status:    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  Patient Pre-hospital Living Situation: Home  Patient Pre-hospital Level of Mobility: walks  Patient Pre-hospital Diet Restrictions: Type 1 Diabetic  Substance Use History:   Social History     Substance and Sexual Activity   Alcohol Use Yes    Comment: approx 2 x week     Social History     Tobacco Use   Smoking Status Former Smoker    Types: Cigarettes    Quit date: 9/10/2018    Years since quittin 9   Smokeless Tobacco Never Used     Social History     Substance and Sexual Activity   Drug Use No       Family History:  Family History   Problem Relation Age of Onset    Hypertension Mother     Hypertension Father     Melanoma Paternal Aunt     Skin cancer Family     Pancreatic cancer Maternal Grandfather     Stroke Paternal Grandfather        Physical Exam:     Vitals:   Blood Pressure: 106/64 (21)  Pulse: 89 (21)  Temperature: 98 4 °F (36 9 °C) (21)  Temp Source: Oral (21)  Respirations: 18 (21)  Height: 5' 2" (157 5 cm) (21)  Weight - Scale: 61 4 kg (135 lb 6 4 oz) (21)  SpO2: 97 % (21)    Physical Exam  Vitals and nursing note reviewed  Constitutional:       General: She is not in acute distress  Appearance: Normal appearance  She is well-developed and normal weight  She is not ill-appearing, toxic-appearing or diaphoretic  HENT:      Head: Normocephalic and atraumatic  Nose: No congestion or rhinorrhea  Eyes:      General:         Right eye: No discharge  Left eye: No discharge  Cardiovascular:      Rate and Rhythm: Regular rhythm   Tachycardia present  Pulses: Normal pulses  Heart sounds: Normal heart sounds  No murmur heard  No friction rub  No gallop  Comments:   Pulmonary:      Effort: Pulmonary effort is normal  No respiratory distress  Breath sounds: No stridor  Wheezing (occasional inspiratory and expiratory heard throughout, R>L) and rales (inspiratory crackles greatest in R base) present  No rhonchi  Comments: Dullness to percussion R lower lung fields compared to L  Abdominal:      General: Bowel sounds are normal  There is no distension  Palpations: Abdomen is soft  There is no mass  Tenderness: There is no abdominal tenderness  There is no guarding or rebound  Hernia: No hernia is present  Musculoskeletal:      Right lower leg: No edema  Left lower leg: No edema  Skin:     General: Skin is warm and dry  Coloration: Skin is not jaundiced or pale  Findings: No bruising or erythema  Neurological:      General: No focal deficit present  Mental Status: She is alert     Psychiatric:         Mood and Affect: Mood normal          Behavior: Behavior normal         Additional Data:     Lab Results:  Results from last 7 days   Lab Units 09/02/21  1437   WBC Thousand/uL 18 77*   HEMOGLOBIN g/dL 14 4   HEMATOCRIT % 44 2   PLATELETS Thousands/uL 224   NEUTROS PCT % 87*   LYMPHS PCT % 6*   MONOS PCT % 6   EOS PCT % 0     Results from last 7 days   Lab Units 09/02/21  1437   SODIUM mmol/L 137   POTASSIUM mmol/L 4 4   CHLORIDE mmol/L 98*   CO2 mmol/L 28   BUN mg/dL 8   CREATININE mg/dL 0 75   ANION GAP mmol/L 11   CALCIUM mg/dL 9 1   ALBUMIN g/dL 3 7   TOTAL BILIRUBIN mg/dL 0 99   ALK PHOS U/L 88   ALT U/L 15   AST U/L 11   GLUCOSE RANDOM mg/dL 226*                       Imaging: Reviewed radiology reports from this admission including: chest xray and chest CT scan and Personally reviewed the following imaging: chest xray and chest CT scan  CTA ED chest PE study   Final Result by Floyd Torres Nash Carrizales MD (09/02 1725)         1  No evidence for acute pulmonary embolism  2   Ill-defined groundglass alveolar opacification in the right lower lobe with associated thickening of the walls of the subserving bronchi, some of which appear to be occluded  Findings suspicious for pneumonia with associated bronchitis and/or mucous    plugging  Atypical organisms to be considered  While not excludable, findings are not typical in appearance for Covid 19 pneumonia  Appropriate follow-up imaging advised  The study was marked in [facility] for immediate notification  Workstation performed: [id number]         XR chest 1 view portable   ED Interpretation by Ander Morin PA-C (09/02 1722)   No consolidation          EKG and Other Studies Reviewed on Admission:   · EKG: Sinus Tachycardia  HR 99     ** Please Note: This note has been constructed using a voice recognition system   **

## 2021-09-03 NOTE — ASSESSMENT & PLAN NOTE
Fully vaccinated pt with multiple sick contacts (mom, daughter), also fully vaccinated  x10 days mild symptoms  Nasal congestion, rhinorrhea, sore throat at outset, now resolved  Continues to have non-productive cough and test tightness with deep inspiration  HA, myalgias, arthralgias for last few days  Endorses fatigue, decreased activity, decreased taste and smell, decreased appetite with poor PO intake  Nausea without vomiting  Some loose stools, but no watery diarrhea  Denies fever, chills, SOB  Earlier today, upon standing, experienced one episode of sudden-onset diaphoresis, pallor, nausea, lightheadedness, with home pulse-ox demonstrating O2 sat 90%   Pt not currently requiring supplemental O2, satting 96-99% RA   CRP elevated 110 8; procal: 0 38 (yesterday 09/02 0 32)  Lab Results   Component Value Date    WBC 14 52 (H) 09/03/2021    HGB 13 3 09/03/2021    HCT 40 3 09/03/2021    MCV 91 09/03/2021     09/03/2021     Lab Results   Component Value Date     10/14/2017    SODIUM 138 09/03/2021    K 3 9 09/03/2021     09/03/2021    CO2 26 09/03/2021    ANIONGAP 8 12/21/2015    AGAP 9 09/03/2021    BUN 6 09/03/2021    CREATININE 0 59 (L) 09/03/2021    GLUC 192 (H) 09/03/2021    GLUF 203 (H) 07/14/2018    CALCIUM 8 7 09/03/2021    AST 11 09/03/2021    ALT 15 09/03/2021    ALKPHOS 88 09/03/2021    PROT 6 7 12/21/2015    TP 7 0 09/03/2021    BILITOT 0 70 12/21/2015    TBILI 0 62 09/03/2021    EGFR 115 09/03/2021           Plan:  -Continue to trend procal  -Daily CBC with diff, CMP, per COVID protocol  -If pt develops O2 requirement, start remdesivir, dexamethasone, systemic therapeutic anticoagulation per Covid protocol

## 2021-09-03 NOTE — UTILIZATION REVIEW
Initial Clinical Review    Admission: Date/Time/Statement:   Admission Orders (From admission, onward)     Ordered        09/02/21 1737  INPATIENT ADMISSION  Once                   Orders Placed This Encounter   Procedures    INPATIENT ADMISSION     Standing Status:   Standing     Number of Occurrences:   1     Order Specific Question:   Level of Care     Answer:   Med Surg [16]     Order Specific Question:   Estimated length of stay     Answer:   More than 2 Midnights     Order Specific Question:   Certification     Answer:   I certify that inpatient services are medically necessary for this patient for a duration of greater than two midnights  See H&P and MD Progress Notes for additional information about the patient's course of treatment  ED Arrival Information     Expected Arrival Acuity    - 9/2/2021 11:38 Urgent         Means of arrival Escorted by Service Admission type    Walk-In Self Hospitalist Urgent         Arrival complaint    COVID+/O2 LOW        Chief Complaint   Patient presents with    Decreased Oxygen Level     Patient COVID + as of 8/27/21 denies CP/SOB, states "I am just not getting better  O2 90% at home"       Initial Presentation: this is a 37year old female from home to ED admitted inpatient due to Sepsis/Covid 19 with superimposed bacterial pneumonia  History of type 1 DM, tested positive COVID 8/27/2021, is vaccinated  Presented due to chest tightness and difficulty breathing starting day prior to arrival, has not felt well since diagnosed with COVID  Has decreased appetite  Oxygen levels to  90%  On exam mucous membranes dry  Tachycardia  Lungs wheezing  Procalcitonin 0 32   8  Glucose 226  Wbc 18 77   cta chest showed pneumonia  In the ED given 1 liter of IVF, started on ceftriaxone  Plan  Is follow up cultures, antibiotics of Rocephin and azithromycin in progress  Continue pt-maintained insulin pump during admission    Date: 9/3/2021   Day 2: has nausea  Since yesterday, 5 loose watery stools  Shortness of breath is unchanged  Has chest pressure with deep inspiration  On exam tachycardia  Rales right > left  Wbc 14 52  Attempt for sputum culture, however cough is dry  Oxygen to keep sat > 89%, continue Ceftriaxone 1 g IV q24h and Azithromycin 500 mg q24h, mucinex   -If pt develops O2 requirement, start remdesivir, dexamethasone, systemic therapeutic anticoagulation per Covid protocol    ED Triage Vitals   Temperature Pulse Respirations Blood Pressure SpO2   09/02/21 1152 09/02/21 1152 09/02/21 1152 09/02/21 1152 09/02/21 1152   99 4 °F (37 4 °C) (!) 116 16 132/75 98 %      Temp Source Heart Rate Source Patient Position - Orthostatic VS BP Location FiO2 (%)   09/02/21 1152 09/02/21 1152 09/02/21 1152 09/02/21 1152 --   Oral Monitor Lying Left arm       Pain Score       09/02/21 1339       7          Wt Readings from Last 1 Encounters:   09/02/21 61 4 kg (135 lb 6 4 oz)     Additional Vital Signs:   09/03/21 0700  98 5 °F (36 9 °C)  91  18  105/65  --  96 %  None (Room air)  Lying   09/02/21 2236  --  103  18  102/62  77  98 %  None (Room air)  Standing for 3 minutes - Orthostatic VS   09/02/21 2233  --  102  18  112/68  85  98 %  None (Room air)  Standing - Orthostatic VS   09/02/21 2232  --  93  18  103/60  76  97 %  None (Room air)  Sitting - Orthostatic VS   09/02/21 2231  --  91  18  104/61  76  96 %  None (Room air)  Lying - Orthostatic VS       Pertinent Labs/Diagnostic Test Results:   9/2/2021 CxR - No acute cardiopulmonary disease  9/2/2021 cta chest   No evidence for acute pulmonary embolism  2   Ill-defined groundglass alveolar opacification in the right lower lobe with associated thickening of the walls of the subserving bronchi, some of which appear to be occluded  Findings suspicious for pneumonia with associated bronchitis and/or mucous   plugging  Atypical organisms to be considered       9/2/2021 ecg Normal sinus rhythm  Right atrial enlargement  RSR' or QR pattern in V1 suggests right ventricular conduction delay  Borderline ECG  When compared with ECG of 30-JUN-2014 09:45,  No significant change was found  Results from last 7 days   Lab Units 08/27/21  1157   SARS-COV-2  Positive*     Results from last 7 days   Lab Units 09/03/21  0608 09/02/21  1437   WBC Thousand/uL 14 52* 18 77*   HEMOGLOBIN g/dL 13 3 14 4   HEMATOCRIT % 40 3 44 2   PLATELETS Thousands/uL 215 224   NEUTROS ABS Thousands/µL 11 74* 16 34*     Results from last 7 days   Lab Units 09/03/21  0608 09/02/21  1437   SODIUM mmol/L 138 137   POTASSIUM mmol/L 3 9 4 4   CHLORIDE mmol/L 103 98*   CO2 mmol/L 26 28   ANION GAP mmol/L 9 11   BUN mg/dL 6 8   CREATININE mg/dL 0 59* 0 75   EGFR ml/min/1 73sq m 115 100   CALCIUM mg/dL 8 7 9 1     Results from last 7 days   Lab Units 09/03/21  0608 09/02/21  1437   AST U/L 11 11   ALT U/L 15 15   ALK PHOS U/L 88 88   TOTAL PROTEIN g/dL 7 0 8 0   ALBUMIN g/dL 3 1* 3 7   TOTAL BILIRUBIN mg/dL 0 62 0 99     Results from last 7 days   Lab Units 09/03/21  0608 09/02/21  1437   GLUCOSE RANDOM mg/dL 192* 226*     Results from last 7 days   Lab Units 09/02/21  1437   CK TOTAL U/L 32     Results from last 7 days   Lab Units 09/02/21  1437   TROPONIN I ng/mL <0 02     Results from last 7 days   Lab Units 09/02/21  1749   PROCALCITONIN ng/ml 0 32*     Results from last 7 days   Lab Units 09/02/21  2228   NT-PRO BNP pg/mL 132*     Results from last 7 days   Lab Units 09/02/21  1437   CRP mg/L 110 8*     Results from last 7 days   Lab Units 09/02/21  1429   CLARITY UA  Clear   COLOR UA  Eve   SPEC GRAV UA  >=1 030   PH UA  6 0   GLUCOSE UA mg/dl 250 (1/4%)*   KETONES UA mg/dl >=160 (4+)*   BLOOD UA  Negative   PROTEIN UA mg/dl 100 (2+)*   NITRITE UA  Negative   BILIRUBIN UA  Interference- unable to analyze*   UROBILINOGEN UA E U /dl 0 2   LEUKOCYTES UA  Negative   WBC UA /hpf 0-5   RBC UA /hpf 0-1   BACTERIA UA /hpf Occasional   EPITHELIAL CELLS WET PREP /hpf Occasional     Results from last 7 days   Lab Units 09/02/21  1753 09/02/21  1745   BLOOD CULTURE  Received in Microbiology Lab  Culture in Progress  Received in Microbiology Lab  Culture in Progress       ED Treatment:   Medication Administration from 09/02/2021 1138 to 09/02/2021 1942       Date/Time Order Dose Route Action Comments     09/02/2021 1453 sodium chloride 0 9 % bolus 1,000 mL 1,000 mL Intravenous New Bag      09/02/2021 1454 ondansetron (ZOFRAN) injection 4 mg 4 mg Intravenous Given      09/02/2021 1454 ketorolac (TORADOL) injection 15 mg 15 mg Intravenous Given      09/02/2021 1454 albuterol (PROVENTIL HFA,VENTOLIN HFA) inhaler 2 puff 2 puff Inhalation Given      09/02/2021 1558 ketorolac (TORADOL) injection 15 mg 15 mg Intravenous Given      09/02/2021 1601 acetaminophen (TYLENOL) tablet 975 mg 975 mg Oral Given      09/02/2021 1755 ceftriaxone (ROCEPHIN) 1 g/50 mL in dextrose IVPB 1,000 mg Intravenous New Bag         Past Medical History:   Diagnosis Date    Celiac disease     Diabetes mellitus ([place] [address] ) 08/16/2021    IDDM, uses insulin pump    Encounter for smoking cessation counseling     GERD (gastroesophageal reflux disease)     Hypertension     during pregnancy    Type 1 diabetes ([place] [address] )      Present on Admission:   Type 1 diabetes mellitus with hyperglycemia, with long-term current use of insulin ([place] [address] )   COVID-19 virus infection with superimposed bacterial pneumonia    Sepsis ([place] [address] )      Admitting Diagnosis: Community acquired pneumonia [J18 9]  Sepsis ([place] [address] ) [A41 9]  2019 novel coronavirus disease (COVID-19) [U07 1]  COVID-19 [U07 1]  Age/Sex: 37 y o  female  Admission Orders:  9/2/2021 1737  Inpatient   Scheduled Medications:  azithromycin, 500 mg, Oral, Q24H  cefTRIAXone, 1,000 mg, Intravenous, Q24H  enoxaparin, 40 mg, Subcutaneous, Daily  guaiFENesin, 1,200 mg, Oral, Q12H [facility]  insulin lispro, 1-5 Units, Subcutaneous, 4 times day  patient maintained insulin pump, , Subcutaneous, Daily      Continuous IV Infusions:  None      PRN Meds:  acetaminophen, 650 mg, Oral, Q4H PRN - used x 1 9/2  albuterol, 2 puff, Inhalation, Q4H PRN  benzonatate, 200 mg, Oral, TID PRN - used x 1 9/3  ibuprofen, 600 mg, Oral, Q6H PRN - used x 1 9/3   loperamide, 2 mg, Oral, TID PRN - used x 1 9/3   ondansetron, 4 mg, Intravenous, Q6H PRN      Network Utilization Review Department  ATTENTION: Please call with any questions or concerns to [phone] and carefully listen to the prompts so that you are directed to the right person  All voicemails are confidential   Linda Stringer all requests for admission clinical reviews, approved or denied determinations and any other requests to dedicated fax number below belonging to the campus where the patient is receiving treatment   List of dedicated fax numbers for the Facilities:  [address] DENIALS (Administrative/Medical Necessity) [phone]   [address] (Maternity/NICU/Pediatrics) [phone]   [address] [place] [address] [address] [place] [address] [id number] [place] [address] [address] [facility] [place] [address] [phone]

## 2021-09-03 NOTE — ASSESSMENT & PLAN NOTE
Lab Results   Component Value Date    HGBA1C 9 4 (H) 06/01/2021     Pt diagnosed with Type I DM at age 29  Uses pt-maintained pump with Novolog for glucose control  Reports that sugars on day of presentation have been around 250, which is not atypical      Lab Results   Component Value Date    GLUC 226 (H) 09/02/2021     -Continue pt-maintained insulin pump during admission  -SSI TID with meals and QHS  -monitor blood glucose and adjust as needed  -Avoid hypoglycemia and treat per protocol  - 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

## 2021-09-03 NOTE — ASSESSMENT & PLAN NOTE
Patient noted that she had about 4-5 loose bowel movements over the last 24 hours  She says they were very loose and brown       - Cdiff PCR ordered and pending

## 2021-09-03 NOTE — ASSESSMENT & PLAN NOTE
POA: pt meeting SIRS criteria by tachycardia (116), RR (22), and leukocytosis (WBC 18 7) with confirmed COVID-19 infection and suspected bacterial pneumonia based on physical exam, labs, imaging studies  Pt is hemodynamically stable (100/56 - 132/75) and maintaining O2 sats 96-99% on RA without increased work of breathing and normal RR at time of admission exam  Afebrile in the ED and without subjective fevers or chills at home  Suspect tachycardia may be appropriately compensatory in the setting of hypovolemia due to recent poor PO intake      Pending Blood cx, sputum cx  Procal: 0 28 (yesterday 09/03 0 38)    Plan:  -abx as addressed in Community Acquired Pneumonia  -Diabetic Diet w/ SSI ( patient already takes her home insulin and  Does her own point of care glucose checks that  Nursing records on the chart  So nurse requested that patient be removed of sliding-scale insulin  At this time since patient is able to  Use her home  Insulin and nurse monitor is and adjust as NEEDED)  -  Encourage PO intake  - procal mildly elevated and cont to trend  -Tylenol prn for fever

## 2021-09-03 NOTE — ASSESSMENT & PLAN NOTE
Lab Results   Component Value Date    HGBA1C 9 4 (H) 06/01/2021     Pt diagnosed with Type I DM at age 29  Uses pt-maintained pump with Novolog for glucose control   Reports that sugars on day of presentation have been around 250, which is not atypical      Lab Results   Component Value Date    GLUC 226 (H) 09/02/2021     -Continue pt-maintained insulin pump during admission  -SSI TID with meals and QHS  -monitor blood glucose and adjust as needed  -Avoid hypoglycemia and treat per protocol

## 2021-09-03 NOTE — DISCHARGE SUMMARY
[facility]  Discharge- Brisa Gomez 1980, 37 y o  female MRN: [id number]  Unit/Bed#: S -01 Encounter: [id number]  Primary Care Provider: Dexter Cavazos MD   Date and time admitted to hospital: 9/2/2021  1:32 PM    * Sepsis [facility])  Assessment & Plan  POA: pt meeting SIRS criteria by tachycardia (116), RR (22), and leukocytosis (WBC 18 7) with confirmed COVID-19 infection and suspected bacterial pneumonia based on physical exam, labs, imaging studies  Pt is hemodynamically stable (100/56 - 132/75) and maintaining O2 sats 96-99% on RA without increased work of breathing, but experiences pain with deep breathing and still is coughing and normal RR at time of admission exam  Afebrile in the ED and without subjective fevers or chills at home  Suspect tachycardia may be appropriately compensatory in the setting of hypovolemia due to recent poor PO intake  Negative blood cx  Pending sputum cx, but patient not bringing up sputum  Procal: 0 28 (09/03 0 38)    Plan:  -abx as addressed in Community Acquired Pneumonia  -Diabetic Diet w/ SSI ( patient already takes her home insulin and  Does her own point of care glucose checks that  Nursing records on the chart  So nurse requested that patient be removed of sliding-scale insulin  At this time since patient is able to  Use her home  Insulin and nurse monitor is and adjust as NEEDED)  -  Encourage PO intake  - procal mildly elevated and cont to trend  -Tylenol prn for fever  That was discontinued at discharge    CAP (community acquired pneumonia)  Assessment & Plan  Pt with recent diagnosis of COVID-19, fully vaccinated, experiencing mild symptoms x10 days  Endorses non-productive cough and feeling of chest tightness with deep respiration  Denies subjective SOB and satting well on room air (96-99%)   On admission found to have leukocytosis with left shift (WBC 18 7; 87% neutrophils) and RLL ground glass opacities with suspicion for pneumonia with bronchitis and/or mucus plugging on CT Chest  Wheezing and rales on auscultation, most prominent in R lower lung fields  CTA ED chest PE study 9/2/2021: 1  No evidence for acute pulmonary embolism  2   Ill-defined groundglass alveolar opacification in the right lower lobe with associated thickening of the walls of the subserving bronchi, some of which appear to be occluded  Findings suspicious for pneumonia with associated bronchitis and/or mucous  plugging  Atypical organisms to be considered  While not excludable, findings are not typical in appearance for Covid 19 pneumonia  Given CT findings and pt's vaccination status, suspect this is more likely bacterial pneumonia secondary to viral URI with COVID-19 than it is due to a primary COVID-19 pneumonia  CXR read No acute cardiopulmonary disease  Negative blood cultures    Plan:  - negative Urine antigens: Strep, Legionella  -Monitor O2 sats and temp  -  Received Ceftriaxone 1 g IV q24h (4 days) and Azithromycin 500 mg q24h (4  Days)  - Will be discharged on  Cefdinir 300 mg twice a day for 3 days after discharge for a total of 7 days of antibiotics and  Azithromycin 500 mg daily for 3 days after discharge for a total of 7 days of antibiotics  -Mucinex 1200 mg BID, Tessalon Perles prn for cough, Albuterol inhaler prn for wheezing  - discontinuedTylenol prn for fever, HA, mild pain  At discharge  -Incentive spirometry and Flutter valve  Discontinued at discharge  -repeat CXR in 8 weeks  From discharge for resolution with PCP    COVID-19 virus infection with superimposed bacterial pneumonia   Assessment & Plan  Fully vaccinated pt with multiple sick contacts (mom, daughter), also fully vaccinated  x10 days mild symptoms  Nasal congestion, rhinorrhea, sore throat at outset, now resolved  Continues to have non-productive cough and test tightness with deep inspiration  HA, myalgias, arthralgias for last few days   Endorses fatigue, decreased activity, decreased taste and smell, decreased appetite with poor PO intake  Nausea without vomiting  Loose stools resolving Denies fever, chills, SOB  Pt not currently requiring supplemental O2, satting 96-99% RA   CRP elevated 121 (yesterday 147 9 09/04); procal: 0 28 (9/4)  Lab Results   Component Value Date    CRP 81 8 (H) 09/06/2021     Lab Results   Component Value Date    WBC 10 95 (H) 09/06/2021 09/06:  Wheezing and rhonchi Lung sounds on right posterior lobes have improved compared to Saturday   there is a strong suspicion that COVID virus infection is still a play due to elevated procalcitonin, and CRP   white blood cell count and  CRP have down trended    Plan:  -  Patient encouraged to quarantine for 10 days due to mild COVID infection  -  See plan under community-acquired pneumonia  -  Down trending leukocytosis  -If pt develops O2 requirement, start remdesivir, dexamethasone, systemic therapeutic anticoagulation per Covid protocol    Diarrhea  Assessment & Plan  Patient noted that she had about 4-5 loose bowel movements over the last 24 hours 09/03  She says they were very loose and brown  09/04  Patient is morning she had not had a bowel movement since  Midnight 09/03  So no stool sample was able to be collected  But otherwise no bowel movements or diarrhea  09/5- no loose stool    -  C diff ordered  The patient has had bowel movements since last diarrhea episodes  Friday morning 09/03  -  Patient was given MiraLax and senna for lack of bowel movements      Bronchitis  Assessment & Plan   In the setting of significant rhonchi/coarse breath sounds in the right lung fields  That have improved compared to yesterday' pulmonology exam  Steroid treatment was offered, however, she was hesitant due to past history of having her blood glucose hard to control in the past with her T1 DM  Thus no steroid treatment at this point  Patient agreed to albuterol inhaler for symptom management       Plan   - Continue p r n  albuterol inhaler and will be discharged with this  As well as Tessalon Perles  -  See antibiotics plan under community-acquired pneumonia    Type 1 diabetes mellitus with hyperglycemia, with long-term current use of insulin [facility])  Assessment & Plan  Lab Results   Component Value Date    HGBA1C 9 4 (H) 06/01/2021     Pt diagnosed with Type I DM at age 29  Uses pt-maintained pump with Novolog for glucose control  Reports that sugars on day of presentation have been around 250, which is not atypical     Lab Results   Component Value Date    SODIUM 141 09/05/2021    K 3 6 09/05/2021     09/05/2021    CO2 28 09/05/2021    BUN 3 (L) 09/05/2021    CREATININE 0 62 09/05/2021    GLUC 131 09/05/2021    CALCIUM 8 7 09/05/2021        -Continue pt-maintained insulin pump during admission  -SSI TID with meals and QHS  -monitor blood glucose and adjust as needed  -Avoid hypoglycemia and treat per protocol  - her nurse patient already has her home supply of insulin and has glucose checks that she does herself  Is all recorded in the chart and the sliding scale insulin/point of care glucose checks  Ordered a nursing communication to record glucose values and insulin values as patient administers and make changes accordingly to the insulin      Medical Problems     Resolved Problems  Date Reviewed: 9/6/2021    None              Discharging Resident: Sukhjinder South DO  Discharging Attending: Izzy Frias*  PCP: Tim Rojo MD  Admission Date:   Admission Orders (From admission, onward)     Ordered        09/02/21 1737  [facility]  Once                   Discharge Date: 09/06/21    Consultations During Hospital Stay:  · None    Procedures Performed:   · None    Significant Findings / Test Results:   1  No evidence for acute pulmonary embolism    2   Ill-defined groundglass alveolar opacification in the right lower lobe with associated thickening of the walls of the subserving bronchi, some of which appear to be occluded  Findings suspicious for pneumonia with associated bronchitis and/or mucous   plugging  Atypical organisms to be considered  While not excludable, findings are not typical in appearance for Covid 19 pneumonia  Appropriate follow-up imaging advised  Incidental Findings:   · None    Test Results Pending at Discharge (will require follow up): · None     Outpatient Tests Requested:  · CXR 8 weeks after discharge    Complications:  None    Reason for Admission:  Pneumonia after viral infection (COVID-19)    Hospital Course:   Chey Lloyd is a 37 y o  female w/ a PMH of Type I DM, celiac dz, GERD and recent diagnosis with COVID-19 infection (fully vaccinated, mild symptoms) patient who originally presented to the hospital on 9/2/2021 due to HA with arthralgias and myalgias following a single episode earlier today of sudden-onset diaphoresis with nausea, pallor, lightheadedness--O2 sat measured at home at this time 90%--episode lasted 2-3 minutes  Denies subjective SOB  Glucose controlled with insulin pump, and pt states that gluc has been approx 250 throughout the day, not uncommon for her  Has been experiencing mild covid symptoms x10 days--started with nasal congestion, rhinorrhea, sore throat, cough, chest tightness with deep inspiration; congestion, rhinorrhea, sore throat have resolved; nonproductive cough and chest tightness persist   For the last few days, patient has also developed headache, myalgias, arthralgias  No fevers or chills  Nausea but no vomiting  No watery diarrhea, but does endorse multiple loose stools  Decreased energy and activity, decreased sense of smell and taste, decreased appetite with poor PO intake and wt loss of a few pounds    In the ED, afebrile, normal sinus tachycardia, tachypnea, maintaining sats 96-99% on RA, found to have leukocytosis with left shift and RLL ground glass opacities on CT Chest  Given ceftriaxone IV once     Patient continued to get ceftriaxone IV while in the hospital for  days and also received azithromycin p o  In the hospital  She also received therapeutic lovenox for anticoagulation since she is experiencing COVID symptoms  She initially did not have much improvement in symptoms and inflammatory levels and white blood cell counts were not downtrending as quickly as expected  Please see above list of diagnoses and related plan for additional information  Condition at Discharge: good    Discharge Day Visit / Exam:   Subjective:  Patient stated that she was feeling much better today compared to yesterday and the day before  She says she has had a gradual improvement in symptoms compared to yesterday  Body aches, cough, shortness of breath, headache are all on the milder side today compared to yesterday  She did state that she has little bit of gassiness/generalized abdominal tenderness//bloating due to ingesting a little bit of gluten ([employer] fries that she ordered last night were told to be gluten free but she said thinks that they are most likely fried and the same well that the gluten products or frie in) since she has a gluten sensitivity  She does also note that her appetite is slowly increasing  She does states she has not a bowel movement in several days since Thursday last week  She did ask for something to help with moving her bowels  She denies any fevers/chill, chest pain, nausea, vomiting, diarrhea, problems urinating, and is eating/drinking without problem        Vitals: Blood Pressure: 110/67 (09/06/21 0652)  Pulse: 98 (09/06/21 0652)  Temperature: 98 7 °F (37 1 °C) (09/06/21 0652)  Temp Source: Oral (09/06/21 9318)  Respirations: 19 (09/06/21 0652)  Height: 5' 2" (157 5 cm) (09/02/21 1949)  Weight - Scale: 61 4 kg (135 lb 6 4 oz) (09/02/21 1949)  SpO2: 97 % (09/06/21 8313)  Exam:   Physical Exam  Vitals and nursing note reviewed     Constitutional:       General: She is not in acute distress  Appearance: She is well-developed  HENT:      Head: Normocephalic and atraumatic  Eyes:      Conjunctiva/sclera: Conjunctivae normal    Cardiovascular:      Rate and Rhythm: Normal rate and regular rhythm  Pulses: Normal pulses  Heart sounds: Normal heart sounds  No murmur heard  Pulmonary:      Effort: Pulmonary effort is normal  No respiratory distress  Breath sounds: Wheezing and rhonchi present  Comments: Noted rhonchi greater on the right compared to left but has improved compared to Saturday  There is some noted wheezing on the left side during expiration but very mild  Abdominal:      Palpations: Abdomen is soft  Tenderness: There is no abdominal tenderness  Musculoskeletal:      Cervical back: Neck supple  Skin:     General: Skin is warm and dry  Neurological:      Mental Status: She is alert  Discussion with Family: Updated  (mother) via phone  Discharge instructions/Information to patient and family:   See after visit summary for information provided to patient and family  Provisions for Follow-Up Care:  See after visit summary for information related to follow-up care and any pertinent home health orders  Disposition:   Home    Planned Readmission:  None    Discharge Medications:  See after visit summary for reconciled discharge medications provided to patient and/or family        **Please Note: This note may have been constructed using a voice recognition system**

## 2021-09-03 NOTE — PROGRESS NOTES
[facility]  Progress Note - Kaitlin Leader 1980, 37 y o  female MRN: [id number]  Unit/Bed#: S -01 Encounter: [id number]  Primary Care Provider: Jimy Jeong MD   Date and time admitted to hospital: 9/2/2021  1:32 PM    * Sepsis [facility])  Assessment & Plan  POA: pt meeting SIRS criteria by tachycardia (116), RR (22), and leukocytosis (WBC 18 7) with confirmed COVID-19 infection and suspected bacterial pneumonia based on physical exam, labs, imaging studies  Pt is hemodynamically stable (100/56 - 132/75) and maintaining O2 sats 96-99% on RA without increased work of breathing and normal RR at time of admission exam  Afebrile in the ED and without subjective fevers or chills at home  Suspect tachycardia may be appropriately compensatory in the setting of hypovolemia due to recent poor PO intake  Pending Blood cx, sputum cx  Procal: 0 38 (yesterday 09/02 0 32)    Plan:  -abx as addressed in Community Acquired Pneumonia  -Diabetic Diet w/ SSI, Encourage PO intake  - procal mildly elevated and cont to trend  -Tylenol prn for fever    CAP (community acquired pneumonia)  Assessment & Plan  Pt with recent diagnosis of COVID-19, fully vaccinated, experiencing mild symptoms x10 days  Endorses non-productive cough and feeling of chest tightness with deep respiration  Denies subjective SOB and satting well on room air (96-99%)  On admission found to have leukocytosis with left shift (WBC 18 7; 87% neutrophils) and RLL ground glass opacities with suspicion for pneumonia with bronchitis and/or mucus plugging on CT Chest  Wheezing and rales on auscultation, most prominent in R lower lung fields  CTA ED chest PE study 9/2/2021: 1  No evidence for acute pulmonary embolism  2   Ill-defined groundglass alveolar opacification in the right lower lobe with associated thickening of the walls of the subserving bronchi, some of which appear to be occluded    Findings suspicious for pneumonia with associated bronchitis and/or mucous  plugging  Atypical organisms to be considered  While not excludable, findings are not typical in appearance for Covid 19 pneumonia  Given CT findings and pt's vaccination status, suspect this is more likely bacterial pneumonia secondary to viral URI with COVID-19 than it is due to a primary COVID-19 pneumonia  CXR read No acute cardiopulmonary disease    Plan:  -sputum Cx if it can be obtained, though unlikely considering non-productive cough  -pending blood cx, Urine antigens: Strep, Legionella  -Monitor O2 sats and temp  -Supplement O2 as needed to maintain sats >89%  -Ceftriaxone 1 g IV q24h and Azithromycin 500 mg q24h  -Mucinex 1200 mg BID, Tessalon Perles prn for cough, Albuterol inhaler prn for wheezing  -Tylenol prn for fever, HA, mild pain  -Incentive spirometry and Flutter valve  -repeat CXR in 4-6 weeks for resolution    COVID-19 virus infection with superimposed bacterial pneumonia   Assessment & Plan  Fully vaccinated pt with multiple sick contacts (mom, daughter), also fully vaccinated  x10 days mild symptoms  Nasal congestion, rhinorrhea, sore throat at outset, now resolved  Continues to have non-productive cough and test tightness with deep inspiration  HA, myalgias, arthralgias for last few days  Endorses fatigue, decreased activity, decreased taste and smell, decreased appetite with poor PO intake  Nausea without vomiting  Some loose stools, but no watery diarrhea  Denies fever, chills, SOB  Earlier today, upon standing, experienced one episode of sudden-onset diaphoresis, pallor, nausea, lightheadedness, with home pulse-ox demonstrating O2 sat 90%   Pt not currently requiring supplemental O2, satting 96-99% RA   CRP elevated 110 8; procal: 0 38 (yesterday 09/02 0 32)  Lab Results   Component Value Date    WBC 14 52 (H) 09/03/2021    HGB 13 3 09/03/2021    HCT 40 3 09/03/2021    MCV 91 09/03/2021     09/03/2021     Lab Results   Component Value Date  10/14/2017    SODIUM 138 09/03/2021    K 3 9 09/03/2021     09/03/2021    CO2 26 09/03/2021    ANIONGAP 8 12/21/2015    AGAP 9 09/03/2021    BUN 6 09/03/2021    CREATININE 0 59 (L) 09/03/2021    GLUC 192 (H) 09/03/2021    GLUF 203 (H) 07/14/2018    CALCIUM 8 7 09/03/2021    AST 11 09/03/2021    ALT 15 09/03/2021    ALKPHOS 88 09/03/2021    PROT 6 7 12/21/2015    TP 7 0 09/03/2021    BILITOT 0 70 12/21/2015    TBILI 0 62 09/03/2021    EGFR 115 09/03/2021           Plan:  -Continue to trend procal  -Daily CBC with diff, CMP, per COVID protocol  -If pt develops O2 requirement, start remdesivir, dexamethasone, systemic therapeutic anticoagulation per Covid protocol    Diarrhea  Assessment & Plan  Patient noted that she had about 4-5 loose bowel movements over the last 24 hours  She says they were very loose and brown  - Cdiff PCR ordered and pending    Type 1 diabetes mellitus with hyperglycemia, with long-term current use of insulin [facility])  Assessment & Plan  Lab Results   Component Value Date    HGBA1C 9 4 (H) 06/01/2021     Pt diagnosed with Type I DM at age 29  Uses pt-maintained pump with Novolog for glucose control  Reports that sugars on day of presentation have been around 250, which is not atypical      Lab Results   Component Value Date    GLUC 226 (H) 09/02/2021     -Continue pt-maintained insulin pump during admission  -SSI TID with meals and QHS  -monitor blood glucose and adjust as needed  -Avoid hypoglycemia and treat per protocol        VTE Pharmacologic Prophylaxis: VTE Score: 3 Moderate Risk (Score 3-4) - Pharmacological DVT Prophylaxis Ordered: enoxaparin (Lovenox)  Patient Centered Rounds: I performed bedside rounds with nursing staff today  Discussions with Specialists or Other Care Team Provider: None    Education and Discussions with Family / Patient: Updated  (mother) via phone      Current Length of Stay: 1 day(s)  Current Patient Status: Inpatient   Discharge Plan: Anticipate discharge in 24-48 hrs to home  Code Status: Level 1 - Full Code    Subjective:   Patient is a complains nausea, diarrhea for to 5 loose watery brown bowel movements since yesterday, body aches, headaches, shortness of breath that is similar as yesterday, chest pressure with deep inspiration,  She noted she has had sangeeta colored urine    She denies any fevers/chill, abdominal pain, vomiting, problems urinating, constipation, lightheadedness, vertigo, numbness, tingling      Objective:     Vitals:   Temp (24hrs), Av 5 °F (36 9 °C), Min:98 4 °F (36 9 °C), Max:98 5 °F (36 9 °C)    Temp:  [98 4 °F (36 9 °C)-98 5 °F (36 9 °C)] 98 5 °F (36 9 °C)  HR:  [] 91  Resp:  [18-21] 18  BP: (100-112)/(56-68) 105/65  SpO2:  [96 %-98 %] 96 %  Body mass index is 24 76 kg/m²  Input and Output Summary (last 24 hours): Intake/Output Summary (Last 24 hours) at 9/3/2021 1525  Last data filed at 9/3/2021 1412  Gross per 24 hour   Intake 1240 ml   Output --   Net 1240 ml       Physical Exam:   Physical Exam  Vitals and nursing note reviewed  Constitutional:       General: She is not in acute distress  Appearance: She is well-developed  HENT:      Head: Normocephalic and atraumatic  Eyes:      Conjunctiva/sclera: Conjunctivae normal    Cardiovascular:      Rate and Rhythm: Regular rhythm  Tachycardia present  Heart sounds: No murmur heard  Pulmonary:      Effort: Pulmonary effort is normal  No respiratory distress  Breath sounds: Rales present  Comments: Noted rales in all lung fields anterior and posterior with right greater than left  Abdominal:      Palpations: Abdomen is soft  Tenderness: There is no abdominal tenderness  Musculoskeletal:      Cervical back: Neck supple  Skin:     General: Skin is warm and dry  Neurological:      Mental Status: She is alert            Additional Data:     Labs:  Results from last 7 days   Lab Units 21  0608   WBC Thousand/uL 14 52*   HEMOGLOBIN g/dL 13 3   HEMATOCRIT % 40 3   PLATELETS Thousands/uL 215   NEUTROS PCT % 81*   LYMPHS PCT % 9*   MONOS PCT % 8   EOS PCT % 1     Results from last 7 days   Lab Units 09/03/21  0608   SODIUM mmol/L 138   POTASSIUM mmol/L 3 9   CHLORIDE mmol/L 103   CO2 mmol/L 26   BUN mg/dL 6   CREATININE mg/dL 0 59*   ANION GAP mmol/L 9   CALCIUM mg/dL 8 7   ALBUMIN g/dL 3 1*   TOTAL BILIRUBIN mg/dL 0 62   ALK PHOS U/L 88   ALT U/L 15   AST U/L 11   GLUCOSE RANDOM mg/dL 192*                 Results from last 7 days   Lab Units 09/03/21  0608 09/02/21  1749   PROCALCITONIN ng/ml 0 38* 0 32*       Lines/Drains:  Invasive Devices     Peripheral Intravenous Line            Peripheral IV 09/02/21 Left Antecubital 1 day                      Imaging: Reviewed radiology reports from this admission including: chest xray and chest CT scan    Recent Cultures (last 7 days):   Results from last 7 days   Lab Units 09/02/21  1753 09/02/21  1745   BLOOD CULTURE  Received in Microbiology Lab  Culture in Progress  Received in Microbiology Lab  Culture in Progress         Last 24 Hours Medication List:   Current Facility-Administered Medications   Medication Dose Route Frequency Provider Last Rate    acetaminophen  650 mg Oral Q4H PRN Vero Bauer MD      albuterol  2 puff Inhalation Q4H PRN Vero Bauer MD      azithromycin  500 mg Oral Q24H Vero Bauer MD      benzonatate  200 mg Oral TID PRN Vero Bauer MD      cefTRIAXone  1,000 mg Intravenous Q24H Vero Bauer MD 1,000 mg (09/03/21 0954)    enoxaparin  40 mg Subcutaneous Daily Vero Bauer MD      guaiFENesin  1,200 mg Oral Q12H [address] Vero Bauer MD      ibuprofen  600 mg Oral Q6H PRN Emogene Shook, DO      insulin lispro  1-5 Units Subcutaneous 4 times day [facility], DO      loperamide  2 mg Oral TID PRN Emogene Shook, DO      ondansetron  4 mg Intravenous Q6H PRN Emogene Shook, DO      patient maintained insulin pump Subcutaneous Daily Shelia Pope MD          Today, Patient Was Seen By: Lawyer Ana DO    **Please Note: This note may have been constructed using a voice recognition system  **

## 2021-09-03 NOTE — PLAN OF CARE
Peripheral Block    Patient location during procedure: holding area   Block not for primary anesthetic.  Reason for block: at surgeon's request and post-op pain management   Post-op Pain Location: left shoulder  Start time: 2/6/2020 8:55 AM  Timeout: 2/6/2020 8:50 AM   End time: 2/6/2020 9:06 AM    Staffing  Authorizing Provider: Haim Adan MD  Performing Provider: Haim Adan MD    Preanesthetic Checklist  Completed: patient identified, site marked, surgical consent, pre-op evaluation, timeout performed, IV checked, risks and benefits discussed and monitors and equipment checked  Peripheral Block  Patient position: sitting  Prep: ChloraPrep  Patient monitoring: continuous pulse ox, cardiac monitor, heart rate and frequent blood pressure checks  Block type: interscalene  Laterality: left  Injection technique: single shot  Needle  Needle type: Quincke   Needle gauge: 25 G  Needle length: 1.5 in  Needle localization: ultrasound guidance   -ultrasound image captured on disc.  Assessment  Injection assessment: negative aspiration, negative parasthesia and local visualized surrounding nerve  Heart rate change: no  Slow fractionated injection: yes  Additional Notes  With 1:200,000 Epinephrine           Problem: PAIN - ADULT  Goal: Verbalizes/displays adequate comfort level or baseline comfort level  Description: Interventions:  - Encourage patient to monitor pain and request assistance  - Assess pain using appropriate pain scale  - Administer analgesics based on type and severity of pain and evaluate response  - Implement non-pharmacological measures as appropriate and evaluate response  - Consider cultural and social influences on pain and pain management  - Notify physician/advanced practitioner if interventions unsuccessful or patient reports new pain  Outcome: Progressing     Problem: INFECTION - ADULT  Goal: Absence or prevention of progression during hospitalization  Description: INTERVENTIONS:  - Assess and monitor for signs and symptoms of infection  - Monitor lab/diagnostic results  - Monitor all insertion sites, i e  indwelling lines, tubes, and drains  - Monitor endotracheal if appropriate and nasal secretions for changes in amount and color  - Whitmer appropriate cooling/warming therapies per order  - Administer medications as ordered  - Instruct and encourage patient and family to use good hand hygiene technique  - Identify and instruct in appropriate isolation precautions for identified infection/condition  Outcome: Progressing  Goal: Absence of fever/infection during neutropenic period  Description: INTERVENTIONS:  - Monitor WBC    Outcome: Progressing     Problem: SAFETY ADULT  Goal: Patient will remain free of falls  Description: INTERVENTIONS:  - Educate patient/family on patient safety including physical limitations  - Instruct patient to call for assistance with activity   - Consult OT/PT to assist with strengthening/mobility   - Keep Call bell within reach  - Keep bed low and locked with side rails adjusted as appropriate  - Keep care items and personal belongings within reach  - Initiate and maintain comfort rounds  - Make Fall Risk Sign visible to staff  - Offer Toileting every  Hours, in advance of need  - Initiate/Maintain alarm  - Obtain necessary fall risk management equipment:   - Apply yellow socks and bracelet for high fall risk patients  - Consider moving patient to room near nurses station  Outcome: Progressing  Goal: Maintain or return to baseline ADL function  Description: INTERVENTIONS:  -  Assess patient's ability to carry out ADLs; assess patient's baseline for ADL function and identify physical deficits which impact ability to perform ADLs (bathing, care of mouth/teeth, toileting, grooming, dressing, etc )  - Assess/evaluate cause of self-care deficits   - Assess range of motion  - Assess patient's mobility; develop plan if impaired  - Assess patient's need for assistive devices and provide as appropriate  - Encourage maximum independence but intervene and supervise when necessary  - Involve family in performance of ADLs  - Assess for home care needs following discharge   - Consider OT consult to assist with ADL evaluation and planning for discharge  - Provide patient education as appropriate  Outcome: Progressing  Goal: Maintains/Returns to pre admission functional level  Description: INTERVENTIONS:  - Perform BMAT or MOVE assessment daily    - Set and communicate daily mobility goal to care team and patient/family/caregiver  - Collaborate with rehabilitation services on mobility goals if consulted  - Perform Range of Motion  times a day  - Reposition patient every  hours    - Dangle patient  times a day  - Stand patient  times a day  - Ambulate patient  times a day  - Out of bed to chair  times a day   - Out of bed for meals times a day  - Out of bed for toileting  - Record patient progress and toleration of activity level   Outcome: Progressing     Problem: DISCHARGE PLANNING  Goal: Discharge to home or other facility with appropriate resources  Description: INTERVENTIONS:  - Identify barriers to discharge w/patient and caregiver  - Arrange for needed discharge resources and transportation as appropriate  - Identify discharge learning needs (meds, wound care, etc )  - Arrange for interpretive services to assist at discharge as needed  - Refer to Case Management Department for coordinating discharge planning if the patient needs post-hospital services based on physician/advanced practitioner order or complex needs related to functional status, cognitive ability, or social support system  Outcome: Progressing     Problem: Knowledge Deficit  Goal: Patient/family/caregiver demonstrates understanding of disease process, treatment plan, medications, and discharge instructions  Description: Complete learning assessment and assess knowledge base    Interventions:  - Provide teaching at level of understanding  - Provide teaching via preferred learning methods  Outcome: Progressing

## 2021-09-04 LAB
ANION GAP SERPL CALCULATED.3IONS-SCNC: 8 MMOL/L (ref 4–13)
BASOPHILS # BLD AUTO: 0.02 THOUSANDS/ΜL (ref 0–0.1)
BASOPHILS NFR BLD AUTO: 0 % (ref 0–1)
BUN SERPL-MCNC: 3 MG/DL (ref 5–25)
CALCIUM SERPL-MCNC: 8.7 MG/DL (ref 8.3–10.1)
CHLORIDE SERPL-SCNC: 105 MMOL/L (ref 100–108)
CO2 SERPL-SCNC: 28 MMOL/L (ref 21–32)
CREAT SERPL-MCNC: 0.6 MG/DL (ref 0.6–1.3)
CRP SERPL QL: 147.9 MG/L
EOSINOPHIL # BLD AUTO: 0.26 THOUSAND/ΜL (ref 0–0.61)
EOSINOPHIL NFR BLD AUTO: 2 % (ref 0–6)
ERYTHROCYTE [DISTWIDTH] IN BLOOD BY AUTOMATED COUNT: 12.1 % (ref 11.6–15.1)
GFR SERPL CREATININE-BSD FRML MDRD: 114 ML/MIN/1.73SQ M
GLUCOSE SERPL-MCNC: 198 MG/DL (ref 65–140)
GLUCOSE SERPL-MCNC: 203 MG/DL (ref 65–140)
HCT VFR BLD AUTO: 39.3 % (ref 34.8–46.1)
HGB BLD-MCNC: 13 G/DL (ref 11.5–15.4)
IMM GRANULOCYTES # BLD AUTO: 0.1 THOUSAND/UL (ref 0–0.2)
IMM GRANULOCYTES NFR BLD AUTO: 1 % (ref 0–2)
LYMPHOCYTES # BLD AUTO: 1.45 THOUSANDS/ΜL (ref 0.6–4.47)
LYMPHOCYTES NFR BLD AUTO: 10 % (ref 14–44)
MCH RBC QN AUTO: 30 PG (ref 26.8–34.3)
MCHC RBC AUTO-ENTMCNC: 33.1 G/DL (ref 31.4–37.4)
MCV RBC AUTO: 91 FL (ref 82–98)
MONOCYTES # BLD AUTO: 1.03 THOUSAND/ΜL (ref 0.17–1.22)
MONOCYTES NFR BLD AUTO: 7 % (ref 4–12)
NEUTROPHILS # BLD AUTO: 11.23 THOUSANDS/ΜL (ref 1.85–7.62)
NEUTS SEG NFR BLD AUTO: 80 % (ref 43–75)
NRBC BLD AUTO-RTO: 0 /100 WBCS
PLATELET # BLD AUTO: 262 THOUSANDS/UL (ref 149–390)
PMV BLD AUTO: 10.3 FL (ref 8.9–12.7)
POTASSIUM SERPL-SCNC: 3.8 MMOL/L (ref 3.5–5.3)
PROCALCITONIN SERPL-MCNC: 0.28 NG/ML
RBC # BLD AUTO: 4.34 MILLION/UL (ref 3.81–5.12)
SODIUM SERPL-SCNC: 141 MMOL/L (ref 136–145)
WBC # BLD AUTO: 14.09 THOUSAND/UL (ref 4.31–10.16)

## 2021-09-04 PROCEDURE — 85025 COMPLETE CBC W/AUTO DIFF WBC: CPT

## 2021-09-04 PROCEDURE — 86140 C-REACTIVE PROTEIN: CPT | Performed by: INTERNAL MEDICINE

## 2021-09-04 PROCEDURE — 84145 PROCALCITONIN (PCT): CPT | Performed by: INTERNAL MEDICINE

## 2021-09-04 PROCEDURE — 80048 BASIC METABOLIC PNL TOTAL CA: CPT

## 2021-09-04 PROCEDURE — 99232 SBSQ HOSP IP/OBS MODERATE 35: CPT | Performed by: INTERNAL MEDICINE

## 2021-09-04 PROCEDURE — 82948 REAGENT STRIP/BLOOD GLUCOSE: CPT

## 2021-09-04 RX ADMIN — IBUPROFEN 600 MG: 600 TABLET ORAL at 08:52

## 2021-09-04 RX ADMIN — ENOXAPARIN SODIUM 40 MG: 40 INJECTION SUBCUTANEOUS at 08:52

## 2021-09-04 RX ADMIN — CEFTRIAXONE 1000 MG: 10 INJECTION, POWDER, FOR SOLUTION INTRAVENOUS at 08:53

## 2021-09-04 RX ADMIN — AZITHROMYCIN MONOHYDRATE 500 MG: 250 TABLET ORAL at 20:00

## 2021-09-04 RX ADMIN — BENZONATATE 200 MG: 100 CAPSULE ORAL at 02:25

## 2021-09-04 RX ADMIN — ALBUTEROL SULFATE 2 PUFF: 90 AEROSOL, METERED RESPIRATORY (INHALATION) at 16:30

## 2021-09-04 RX ADMIN — IBUPROFEN 600 MG: 600 TABLET ORAL at 20:02

## 2021-09-04 RX ADMIN — GUAIFENESIN 1200 MG: 600 TABLET, EXTENDED RELEASE ORAL at 20:00

## 2021-09-04 RX ADMIN — ONDANSETRON 4 MG: 2 INJECTION INTRAMUSCULAR; INTRAVENOUS at 08:52

## 2021-09-04 RX ADMIN — GUAIFENESIN 1200 MG: 600 TABLET, EXTENDED RELEASE ORAL at 08:52

## 2021-09-04 NOTE — ASSESSMENT & PLAN NOTE
Patient noted that she had about 4-5 loose bowel movements over the last 24 hours 09/03  She says they were very loose and brown      09/04  Patient is morning she had not had a bowel movement since  Midnight 09/03  So no stool sample was able to be collected  But otherwise no bowel movements or diarrhea  09/5- no loose stool  - Cdiff PCR ordered and pending

## 2021-09-04 NOTE — ASSESSMENT & PLAN NOTE
Fully vaccinated pt with multiple sick contacts (mom, daughter), also fully vaccinated  x10 days mild symptoms  Nasal congestion, rhinorrhea, sore throat at outset, now resolved  Continues to have non-productive cough and test tightness with deep inspiration  HA, myalgias, arthralgias for last few days  Endorses fatigue, decreased activity, decreased taste and smell, decreased appetite with poor PO intake  Nausea without vomiting  Loose stools resolving Denies fever, chills, SOB    Pt not currently requiring supplemental O2, satting 96-99% RA   CRP elevated 121 (yesterday 147 9 09/04); procal: 0 28 (9/4)  Lab Results   Component Value Date     1 (H) 09/05/2021     Lab Results   Component Value Date    WBC 11 13 (H) 09/05/2021        there is a strong suspicion that COVID virus infection is still a play due to elevated procalcitonin, and CRP    Plan:  - Continue to trend procal   - Still maintains leukocytosis  -Daily CBC with diff, CMP, per COVID protocol  -If pt develops O2 requirement, start remdesivir, dexamethasone, systemic therapeutic anticoagulation per Covid protocol

## 2021-09-04 NOTE — ASSESSMENT & PLAN NOTE
POA: pt meeting SIRS criteria by tachycardia (116), RR (22), and leukocytosis (WBC 18 7) with confirmed COVID-19 infection and suspected bacterial pneumonia based on physical exam, labs, imaging studies  Pt is hemodynamically stable (100/56 - 132/75) and maintaining O2 sats 96-99% on RA without increased work of breathing, but experiences pain with deep breathing and still is coughing and normal RR at time of admission exam  Afebrile in the ED and without subjective fevers or chills at home  Suspect tachycardia may be appropriately compensatory in the setting of hypovolemia due to recent poor PO intake      Negative blood cx  Pending sputum cx, but patient not bringing up sputum  Procal: 0 28 (09/03 0 38)    Plan:  -abx as addressed in Community Acquired Pneumonia  -Diabetic Diet w/ SSI ( patient already takes her home insulin and  Does her own point of care glucose checks that  Nursing records on the chart  So nurse requested that patient be removed of sliding-scale insulin  At this time since patient is able to  Use her home  Insulin and nurse monitor is and adjust as NEEDED)  -  Encourage PO intake  - procal mildly elevated and cont to trend  -Tylenol prn for fever

## 2021-09-04 NOTE — PROGRESS NOTES
[facility]  Progress Note - Brisa Gomez 1980, 37 y o  female MRN: [id number]  Unit/Bed#: S -01 Encounter: [id number]  Primary Care Provider: Dexter Cavazos MD   Date and time admitted to hospital: 9/2/2021  1:32 PM    * Sepsis [facility])  Assessment & Plan  POA: pt meeting SIRS criteria by tachycardia (116), RR (22), and leukocytosis (WBC 18 7) with confirmed COVID-19 infection and suspected bacterial pneumonia based on physical exam, labs, imaging studies  Pt is hemodynamically stable (100/56 - 132/75) and maintaining O2 sats 96-99% on RA without increased work of breathing and normal RR at time of admission exam  Afebrile in the ED and without subjective fevers or chills at home  Suspect tachycardia may be appropriately compensatory in the setting of hypovolemia due to recent poor PO intake  Negative blood cx  Pending sputum cx  Procal: 0 28 (yesterday 09/03 0 38)    Plan:  -abx as addressed in Community Acquired Pneumonia  -Diabetic Diet w/ SSI ( patient already takes her home insulin and  Does her own point of care glucose checks that  Nursing records on the chart  So nurse requested that patient be removed of sliding-scale insulin  At this time since patient is able to  Use her home  Insulin and nurse monitor is and adjust as NEEDED)  -  Encourage PO intake  - procal mildly elevated and cont to trend  -Tylenol prn for fever    CAP (community acquired pneumonia)  Assessment & Plan  Pt with recent diagnosis of COVID-19, fully vaccinated, experiencing mild symptoms x10 days  Endorses non-productive cough and feeling of chest tightness with deep respiration  Denies subjective SOB and satting well on room air (96-99%)   On admission found to have leukocytosis with left shift (WBC 18 7; 87% neutrophils) and RLL ground glass opacities with suspicion for pneumonia with bronchitis and/or mucus plugging on CT Chest  Wheezing and rales on auscultation, most prominent in R lower lung fields  CTA ED chest PE study 9/2/2021: 1  No evidence for acute pulmonary embolism  2   Ill-defined groundglass alveolar opacification in the right lower lobe with associated thickening of the walls of the subserving bronchi, some of which appear to be occluded  Findings suspicious for pneumonia with associated bronchitis and/or mucous  plugging  Atypical organisms to be considered  While not excludable, findings are not typical in appearance for Covid 19 pneumonia  Given CT findings and pt's vaccination status, suspect this is more likely bacterial pneumonia secondary to viral URI with COVID-19 than it is due to a primary COVID-19 pneumonia  CXR read No acute cardiopulmonary disease  Negative blood cultures    Plan:  -sputum Cx if it can be obtained, though unlikely considering non-productive cough  -pending Urine antigens: Strep, Legionella  -Monitor O2 sats and temp  -Supplement O2 as needed to maintain sats >89%  -Ceftriaxone 1 g IV q24h (day 3) and Azithromycin 500 mg q24h (day 3)  -Mucinex 1200 mg BID, Tessalon Perles prn for cough, Albuterol inhaler prn for wheezing  -Tylenol prn for fever, HA, mild pain  -Incentive spirometry and Flutter valve  -repeat CXR in 4-6 weeks for resolution    COVID-19 virus infection with superimposed bacterial pneumonia   Assessment & Plan  Fully vaccinated pt with multiple sick contacts (mom, daughter), also fully vaccinated  x10 days mild symptoms  Nasal congestion, rhinorrhea, sore throat at outset, now resolved  Continues to have non-productive cough and test tightness with deep inspiration  HA, myalgias, arthralgias for last few days  Endorses fatigue, decreased activity, decreased taste and smell, decreased appetite with poor PO intake  Nausea without vomiting  Some loose stools, but no watery diarrhea  Denies fever, chills, SOB   Earlier today, upon standing, experienced one episode of sudden-onset diaphoresis, pallor, nausea, lightheadedness, with home pulse-ox demonstrating O2 sat 90%  Pt not currently requiring supplemental O2, satting 96-99% RA   CRP elevated 147 9 (yesterday 110 8 09/03); procal: 0 28 (<-0 38 yesterday 09/03)  Lab Results   Component Value Date     9 (H) 09/04/2021     Lab Results   Component Value Date    WBC 14 09 (H) 09/04/2021        there is a strong suspicion that COVID virus infection is still a play due to elevated procalcitonin, and CRP    Plan:  - Continue to trend procal (Pro-jerardo: 0 28<- 0 38 <-0 32)  - Still maintains leukocytosis at 14 09 (  Yesterday 09/03 was 14 52)  -Daily CBC with diff, CMP, per COVID protocol  -If pt develops O2 requirement, start remdesivir, dexamethasone, systemic therapeutic anticoagulation per Covid protocol    Diarrhea  Assessment & Plan  Patient noted that she had about 4-5 loose bowel movements over the last 24 hours 09/03  She says they were very loose and brown  09/04  Patient is morning she had not had a bowel movement since  Midnight 09/03  So no stool sample was able to be collected  But otherwise no bowel movements or diarrhea    - Cdiff PCR ordered and pending      Type 1 diabetes mellitus with hyperglycemia, with long-term current use of insulin [facility])  Assessment & Plan  Lab Results   Component Value Date    HGBA1C 9 4 (H) 06/01/2021     Pt diagnosed with Type I DM at age 29  Uses pt-maintained pump with Novolog for glucose control  Reports that sugars on day of presentation have been around 250, which is not atypical      Lab Results   Component Value Date    GLUC 226 (H) 09/02/2021     -Continue pt-maintained insulin pump during admission  -SSI TID with meals and QHS  -monitor blood glucose and adjust as needed  -Avoid hypoglycemia and treat per protocol  - 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        VTE Pharmacologic Prophylaxis: VTE Score: 3 Moderate Risk (Score 3-4) - Pharmacological DVT Prophylaxis Ordered: enoxaparin (Lovenox)  Patient Centered Rounds: I performed bedside rounds with nursing staff today  Discussions with Specialists or Other Care Team Provider: None    Education and Discussions with Family / Patient: Updated  (mother) via phone  Current Length of Stay: 2 day(s)  Current Patient Status: Inpatient   Discharge Plan: Anticipate discharge in 24-48 hrs to home  Code Status: Level 1 - Full Code    Subjective:     Patient states he does not necessarily feel better compared to yesterday  She states that she usually feels better after having antibiotics but soon after she starts having similar symptoms and isn't back at baseline for her she feels in terms of her cough and shortness of breath  She denies having a bowel movement over the past day and is no longer having diarrhea  The last time she had a bowel movement was yesterday around midnight  She does not or still having headaches, body aches, cough, shortness of breath that is about the same as yesterday, chest pressure that has improved compared to yesterday, nausea, like of appetite/not eating as much  There is requested the patient is glucose checks be discontinued due to patient does her own and this is recorded in the chart  She denies any fevers/chill, abdominal pain, vomiting, diarrhea, problems urinating, no problem drinking fluids, consultation        Objective:     Vitals:   Temp (24hrs), Av 1 °F (36 7 °C), Min:97 8 °F (36 6 °C), Max:98 6 °F (37 °C)    Temp:  [97 8 °F (36 6 °C)-98 6 °F (37 °C)] 98 6 °F (37 °C)  HR:  [88-90] 90  Resp:  [18-20] 18  BP: (102-109)/(56-66) 102/56  SpO2:  [96 %-97 %] 97 %  Body mass index is 24 76 kg/m²       Input and Output Summary (last 24 hours): Intake/Output Summary (Last 24 hours) at 9/4/2021 1518  Last data filed at 9/3/2021 1549  Gross per 24 hour   Intake 480 ml   Output 0 ml   Net 480 ml       Physical Exam:   Physical Exam  Vitals and nursing note reviewed  Constitutional:       General: She is not in acute distress  Appearance: She is well-developed  HENT:      Head: Normocephalic and atraumatic  Eyes:      Conjunctiva/sclera: Conjunctivae normal    Cardiovascular:      Rate and Rhythm: Regular rhythm  Tachycardia present  Heart sounds: No murmur heard  Pulmonary:      Effort: Pulmonary effort is normal  No respiratory distress  Breath sounds: Rales present  Comments: Noted rales in all lung fields anterior and posterior with right greater than left (noted that left side rails has improved compared to yesterday while right side has not had much of an improvement)  Abdominal:      Palpations: Abdomen is soft  Tenderness: There is no abdominal tenderness  Musculoskeletal:      Cervical back: Neck supple  Skin:     General: Skin is warm and dry  Neurological:      Mental Status: She is alert            Additional Data:     Labs:  Results from last 7 days   Lab Units 09/04/21  1156   WBC Thousand/uL 14 09*   HEMOGLOBIN g/dL 13 0   HEMATOCRIT % 39 3   PLATELETS Thousands/uL 262   NEUTROS PCT % 80*   LYMPHS PCT % 10*   MONOS PCT % 7   EOS PCT % 2     Results from last 7 days   Lab Units 09/04/21  0453 09/03/21  0608   SODIUM mmol/L 141 138   POTASSIUM mmol/L 3 8 3 9   CHLORIDE mmol/L 105 103   CO2 mmol/L 28 26   BUN mg/dL 3* 6   CREATININE mg/dL 0 60 0 59*   ANION GAP mmol/L 8 9   CALCIUM mg/dL 8 7 8 7   ALBUMIN g/dL  --  3 1*   TOTAL BILIRUBIN mg/dL  --  0 62   ALK PHOS U/L  --  88   ALT U/L  --  15   AST U/L  --  11   GLUCOSE RANDOM mg/dL 198* 192*         Results from last 7 days   Lab Units 09/04/21  0735   POC GLUCOSE mg/dl 203*         Results from last 7 days   Lab Units 09/04/21  0453 09/03/21  3943 09/02/21  1749   PROCALCITONIN ng/ml 0 28* 0 38* 0 32*       Lines/Drains:  Invasive Devices     Peripheral Intravenous Line            Peripheral IV 09/02/21 Left Antecubital 2 days                      Imaging: No pertinent imaging reviewed  Recent Cultures (last 7 days):   Results from last 7 days   Lab Units 09/02/21  1753 09/02/21  1745   BLOOD CULTURE  No Growth at 24 hrs  No Growth at 24 hrs  Last 24 Hours Medication List:   Current Facility-Administered Medications   Medication Dose Route Frequency Provider Last Rate    acetaminophen  650 mg Oral Q4H PRN Matilde Scott MD      albuterol  2 puff Inhalation Q4H PRN Matilde Scott MD      azithromycin  500 mg Oral Q24H Matilde Scott MD      benzonatate  200 mg Oral TID PRN Matilde Scott MD      cefTRIAXone  1,000 mg Intravenous Q24H Matilde Scott MD 1,000 mg (09/04/21 0853)    enoxaparin  40 mg Subcutaneous Daily Matilde Scott MD      guaiFENesin  1,200 mg Oral Q12H [address] Matilde Scott MD      ibuprofen  600 mg Oral Q6H PRN Zheng Winter DO      loperamide  2 mg Oral TID PRN Zheng Winter DO      ondansetron  4 mg Intravenous Q6H PRN Zheng Winter DO      patient maintained insulin pump   Subcutaneous Daily Josefina Silva MD          Today, Patient Was Seen By: Matthew English DO    **Please Note: This note may have been constructed using a voice recognition system  **

## 2021-09-04 NOTE — ASSESSMENT & PLAN NOTE
Lab Results   Component Value Date    HGBA1C 9 4 (H) 06/01/2021     Pt diagnosed with Type I DM at age 29  Uses pt-maintained pump with Novolog for glucose control  Reports that sugars on day of presentation have been around 250, which is not atypical     Recent Labs     09/04/21  0735   POCGLU 203*      -Continue pt-maintained insulin pump during admission  -SSI TID with meals and QHS  -monitor blood glucose and adjust as needed  -Avoid hypoglycemia and treat per protocol  - 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

## 2021-09-04 NOTE — PROGRESS NOTES
Patient states her blood glucose is 298 currently  She gave herself 6 25U of novolog at 12:45  Giving herself a bolus of 2 85U at the moment

## 2021-09-04 NOTE — ASSESSMENT & PLAN NOTE
Pt with recent diagnosis of COVID-19, fully vaccinated, experiencing mild symptoms x10 days  Endorses non-productive cough and feeling of chest tightness with deep respiration  Denies subjective SOB and satting well on room air (96-99%)  On admission found to have leukocytosis with left shift (WBC 18 7; 87% neutrophils) and RLL ground glass opacities with suspicion for pneumonia with bronchitis and/or mucus plugging on CT Chest  Wheezing and rales on auscultation, most prominent in R lower lung fields  CTA ED chest PE study 9/2/2021: 1  No evidence for acute pulmonary embolism  2   Ill-defined groundglass alveolar opacification in the right lower lobe with associated thickening of the walls of the subserving bronchi, some of which appear to be occluded  Findings suspicious for pneumonia with associated bronchitis and/or mucous  plugging  Atypical organisms to be considered  While not excludable, findings are not typical in appearance for Covid 19 pneumonia  Given CT findings and pt's vaccination status, suspect this is more likely bacterial pneumonia secondary to viral URI with COVID-19 than it is due to a primary COVID-19 pneumonia    CXR read No acute cardiopulmonary disease  Negative blood cultures    Plan:  -sputum Cx if it can be obtained, though unlikely considering non-productive cough  -pending Urine antigens: Strep, Legionella  -Monitor O2 sats and temp  -Supplement O2 as needed to maintain sats >89%  -Ceftriaxone 1 g IV q24h (day 4 and Azithromycin 500 mg q24h (day 4)  -Mucinex 1200 mg BID, Tessalon Perles prn for cough, Albuterol inhaler prn for wheezing  -Tylenol prn for fever, HA, mild pain  -Incentive spirometry and Flutter valve  -repeat CXR in 4-6 weeks for resolution

## 2021-09-05 LAB
ANION GAP SERPL CALCULATED.3IONS-SCNC: 7 MMOL/L (ref 4–13)
BASOPHILS # BLD AUTO: 0.03 THOUSANDS/ΜL (ref 0–0.1)
BASOPHILS NFR BLD AUTO: 0 % (ref 0–1)
BUN SERPL-MCNC: 3 MG/DL (ref 5–25)
CALCIUM SERPL-MCNC: 8.7 MG/DL (ref 8.3–10.1)
CHLORIDE SERPL-SCNC: 106 MMOL/L (ref 100–108)
CO2 SERPL-SCNC: 28 MMOL/L (ref 21–32)
CREAT SERPL-MCNC: 0.62 MG/DL (ref 0.6–1.3)
CRP SERPL QL: 121.1 MG/L
EOSINOPHIL # BLD AUTO: 0.3 THOUSAND/ΜL (ref 0–0.61)
EOSINOPHIL NFR BLD AUTO: 3 % (ref 0–6)
ERYTHROCYTE [DISTWIDTH] IN BLOOD BY AUTOMATED COUNT: 12.1 % (ref 11.6–15.1)
GFR SERPL CREATININE-BSD FRML MDRD: 113 ML/MIN/1.73SQ M
GLUCOSE SERPL-MCNC: 131 MG/DL (ref 65–140)
HCT VFR BLD AUTO: 36.9 % (ref 34.8–46.1)
HGB BLD-MCNC: 12.5 G/DL (ref 11.5–15.4)
IMM GRANULOCYTES # BLD AUTO: 0.11 THOUSAND/UL (ref 0–0.2)
IMM GRANULOCYTES NFR BLD AUTO: 1 % (ref 0–2)
L PNEUMO1 AG UR QL IA.RAPID: NEGATIVE
LYMPHOCYTES # BLD AUTO: 1.85 THOUSANDS/ΜL (ref 0.6–4.47)
LYMPHOCYTES NFR BLD AUTO: 17 % (ref 14–44)
MCH RBC QN AUTO: 31.1 PG (ref 26.8–34.3)
MCHC RBC AUTO-ENTMCNC: 33.9 G/DL (ref 31.4–37.4)
MCV RBC AUTO: 92 FL (ref 82–98)
MONOCYTES # BLD AUTO: 1.05 THOUSAND/ΜL (ref 0.17–1.22)
MONOCYTES NFR BLD AUTO: 9 % (ref 4–12)
NEUTROPHILS # BLD AUTO: 7.79 THOUSANDS/ΜL (ref 1.85–7.62)
NEUTS SEG NFR BLD AUTO: 70 % (ref 43–75)
NRBC BLD AUTO-RTO: 0 /100 WBCS
PLATELET # BLD AUTO: 287 THOUSANDS/UL (ref 149–390)
PMV BLD AUTO: 10.5 FL (ref 8.9–12.7)
POTASSIUM SERPL-SCNC: 3.6 MMOL/L (ref 3.5–5.3)
PROCALCITONIN SERPL-MCNC: 0.14 NG/ML
RBC # BLD AUTO: 4.02 MILLION/UL (ref 3.81–5.12)
S PNEUM AG UR QL: NEGATIVE
SODIUM SERPL-SCNC: 141 MMOL/L (ref 136–145)
WBC # BLD AUTO: 11.13 THOUSAND/UL (ref 4.31–10.16)

## 2021-09-05 PROCEDURE — 84145 PROCALCITONIN (PCT): CPT | Performed by: INTERNAL MEDICINE

## 2021-09-05 PROCEDURE — 86140 C-REACTIVE PROTEIN: CPT | Performed by: INTERNAL MEDICINE

## 2021-09-05 PROCEDURE — 99232 SBSQ HOSP IP/OBS MODERATE 35: CPT | Performed by: INTERNAL MEDICINE

## 2021-09-05 PROCEDURE — 85025 COMPLETE CBC W/AUTO DIFF WBC: CPT

## 2021-09-05 PROCEDURE — 87449 NOS EACH ORGANISM AG IA: CPT

## 2021-09-05 PROCEDURE — 80048 BASIC METABOLIC PNL TOTAL CA: CPT

## 2021-09-05 RX ORDER — SIMETHICONE 80 MG
80 TABLET,CHEWABLE ORAL EVERY 6 HOURS PRN
Status: DISCONTINUED | OUTPATIENT
Start: 2021-09-05 | End: 2021-09-06 | Stop reason: HOSPADM

## 2021-09-05 RX ADMIN — SIMETHICONE 80 MG: 80 TABLET, CHEWABLE ORAL at 17:18

## 2021-09-05 RX ADMIN — ALBUTEROL SULFATE 2 PUFF: 90 AEROSOL, METERED RESPIRATORY (INHALATION) at 12:22

## 2021-09-05 RX ADMIN — IBUPROFEN 600 MG: 600 TABLET ORAL at 09:10

## 2021-09-05 RX ADMIN — ENOXAPARIN SODIUM 40 MG: 40 INJECTION SUBCUTANEOUS at 09:11

## 2021-09-05 RX ADMIN — CEFTRIAXONE 1000 MG: 10 INJECTION, POWDER, FOR SOLUTION INTRAVENOUS at 09:11

## 2021-09-05 RX ADMIN — BENZONATATE 200 MG: 100 CAPSULE ORAL at 09:11

## 2021-09-05 RX ADMIN — AZITHROMYCIN MONOHYDRATE 500 MG: 250 TABLET ORAL at 19:44

## 2021-09-05 RX ADMIN — GUAIFENESIN 1200 MG: 600 TABLET, EXTENDED RELEASE ORAL at 09:10

## 2021-09-05 RX ADMIN — GUAIFENESIN 1200 MG: 600 TABLET, EXTENDED RELEASE ORAL at 19:44

## 2021-09-05 NOTE — PROGRESS NOTES
[facility]  Progress Note - Brianna Evans 1980, 37 y o  female MRN: [id number]  Unit/Bed#: S -01 Encounter: [id number]  Primary Care Provider: Candy Kellogg MD   Date and time admitted to hospital: 9/2/2021  1:32 PM    * Sepsis [facility])  Assessment & Plan  POA: pt meeting SIRS criteria by tachycardia (116), RR (22), and leukocytosis (WBC 18 7) with confirmed COVID-19 infection and suspected bacterial pneumonia based on physical exam, labs, imaging studies  Pt is hemodynamically stable (100/56 - 132/75) and maintaining O2 sats 96-99% on RA without increased work of breathing, but experiences pain with deep breathing and still is coughing and normal RR at time of admission exam  Afebrile in the ED and without subjective fevers or chills at home  Suspect tachycardia may be appropriately compensatory in the setting of hypovolemia due to recent poor PO intake  Negative blood cx  Pending sputum cx, but patient not bringing up sputum  Procal: 0 28 (09/03 0 38)    Plan:  -abx as addressed in Community Acquired Pneumonia  -Diabetic Diet w/ SSI ( patient already takes her home insulin and  Does her own point of care glucose checks that  Nursing records on the chart  So nurse requested that patient be removed of sliding-scale insulin  At this time since patient is able to  Use her home  Insulin and nurse monitor is and adjust as NEEDED)  -  Encourage PO intake  - procal mildly elevated and cont to trend  -Tylenol prn for fever    Diarrhea  Assessment & Plan  Patient noted that she had about 4-5 loose bowel movements over the last 24 hours 09/03  She says they were very loose and brown      09/04  Patient is morning she had not had a bowel movement since  Midnight 09/03  So no stool sample was able to be collected  But otherwise no bowel movements or diarrhea  09/5- no loose stool  - Cdiff PCR ordered and pending      CAP (community acquired pneumonia)  Assessment & Plan  Pt with recent diagnosis of COVID-19, fully vaccinated, experiencing mild symptoms x10 days  Endorses non-productive cough and feeling of chest tightness with deep respiration  Denies subjective SOB and satting well on room air (96-99%)  On admission found to have leukocytosis with left shift (WBC 18 7; 87% neutrophils) and RLL ground glass opacities with suspicion for pneumonia with bronchitis and/or mucus plugging on CT Chest  Wheezing and rales on auscultation, most prominent in R lower lung fields  CTA ED chest PE study 9/2/2021: 1  No evidence for acute pulmonary embolism  2   Ill-defined groundglass alveolar opacification in the right lower lobe with associated thickening of the walls of the subserving bronchi, some of which appear to be occluded  Findings suspicious for pneumonia with associated bronchitis and/or mucous  plugging  Atypical organisms to be considered  While not excludable, findings are not typical in appearance for Covid 19 pneumonia  Given CT findings and pt's vaccination status, suspect this is more likely bacterial pneumonia secondary to viral URI with COVID-19 than it is due to a primary COVID-19 pneumonia  CXR read No acute cardiopulmonary disease  Negative blood cultures    Plan:  -sputum Cx if it can be obtained, though unlikely considering non-productive cough  -pending Urine antigens: Strep, Legionella  -Monitor O2 sats and temp  -Supplement O2 as needed to maintain sats >89%  -Ceftriaxone 1 g IV q24h (day 4 and Azithromycin 500 mg q24h (day 4)  -Mucinex 1200 mg BID, Tessalon Perles prn for cough, Albuterol inhaler prn for wheezing  -Tylenol prn for fever, HA, mild pain  -Incentive spirometry and Flutter valve  -repeat CXR in 4-6 weeks for resolution    COVID-19 virus infection with superimposed bacterial pneumonia   Assessment & Plan  Fully vaccinated pt with multiple sick contacts (mom, daughter), also fully vaccinated  x10 days mild symptoms   Nasal congestion, rhinorrhea, sore throat at outset, now resolved  Continues to have non-productive cough and test tightness with deep inspiration  HA, myalgias, arthralgias for last few days  Endorses fatigue, decreased activity, decreased taste and smell, decreased appetite with poor PO intake  Nausea without vomiting  Loose stools resolving Denies fever, chills, SOB  Pt not currently requiring supplemental O2, satting 96-99% RA   CRP elevated 121 (yesterday 147 9 09/04); procal: 0 28 (9/4)  Lab Results   Component Value Date     1 (H) 09/05/2021     Lab Results   Component Value Date    WBC 11 13 (H) 09/05/2021        there is a strong suspicion that COVID virus infection is still a play due to elevated procalcitonin, and CRP    Plan:  - Continue to trend procal   - Still maintains leukocytosis  -Daily CBC with diff, CMP, per COVID protocol  -If pt develops O2 requirement, start remdesivir, dexamethasone, systemic therapeutic anticoagulation per Covid protocol    Type 1 diabetes mellitus with hyperglycemia, with long-term current use of insulin [facility])  Assessment & Plan  Lab Results   Component Value Date    HGBA1C 9 4 (H) 06/01/2021     Pt diagnosed with Type I DM at age 29  Uses pt-maintained pump with Novolog for glucose control  Reports that sugars on day of presentation have been around 250, which is not atypical     Recent Labs     09/04/21  0735   POCGLU 203*      -Continue pt-maintained insulin pump during admission  -SSI TID with meals and QHS  -monitor blood glucose and adjust as needed  -Avoid hypoglycemia and treat per protocol  - 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      VTE Pharmacologic Prophylaxis: VTE Score: 3 Moderate Risk (Score 3-4) - Pharmacological DVT Prophylaxis Ordered: enoxaparin (Lovenox)  Patient Centered Rounds: I performed bedside rounds with nursing staff today  Discussions with Specialists or Other Care Team Provider: none    Education and Discussions with Family / Patient: Updated  (mother) via phone  Time Spent for Care: 30 minutes  More than 50% of total time spent on counseling and coordination of care as described above  Current Length of Stay: 3 day(s)  Current Patient Status: Inpatient   Certification Statement: The patient will continue to require additional inpatient hospital stay due to significant rhonchi still present  Discharge Plan: Anticipate discharge tomorrow to home  Code Status: Level 1 - Full Code    Subjective:   Patient stating she feels much better, still having pain with deep inspiration as well as a cough  No pain, no loose stools  Patient's glucose has been good  Objective:     Vitals:   Temp (24hrs), Av 3 °F (36 8 °C), Min:98 1 °F (36 7 °C), Max:98 7 °F (37 1 °C)    Temp:  [98 1 °F (36 7 °C)-98 7 °F (37 1 °C)] 98 2 °F (36 8 °C)  HR:  [82-96] 82  Resp:  [18-22] 19  BP: (106-109)/(56-68) 109/56  SpO2:  [96 %-97 %] 97 %  Body mass index is 24 76 kg/m²  Input and Output Summary (last 24 hours): Intake/Output Summary (Last 24 hours) at 2021 1104  Last data filed at 2021 0900  Gross per 24 hour   Intake 480 ml   Output 300 ml   Net 180 ml       Physical Exam:   Physical Exam  Vitals and nursing note reviewed  Constitutional:       General: She is not in acute distress  Appearance: She is well-developed  HENT:      Head: Normocephalic and atraumatic  Mouth/Throat:      Mouth: Mucous membranes are moist       Pharynx: Oropharynx is clear  Eyes:      Extraocular Movements: Extraocular movements intact  Conjunctiva/sclera: Conjunctivae normal       Pupils: Pupils are equal, round, and reactive to light     Cardiovascular:      Rate and Rhythm: Normal rate and regular rhythm  Pulses: Normal pulses  Heart sounds: Normal heart sounds  No murmur heard  Pulmonary:      Effort: Pulmonary effort is normal  No respiratory distress  Breath sounds: Rhonchi (Mostly on the right in upper and middle lung fields) present  Abdominal:      General: Abdomen is flat  Bowel sounds are normal  There is no distension  Palpations: Abdomen is soft  Tenderness: There is no abdominal tenderness  Musculoskeletal:         General: No swelling or tenderness  Cervical back: Neck supple  Skin:     General: Skin is warm and dry  Neurological:      Mental Status: She is alert and oriented to person, place, and time     Psychiatric:         Mood and Affect: Mood normal           Additional Data:     Labs:  Results from last 7 days   Lab Units 09/05/21  0502   WBC Thousand/uL 11 13*   HEMOGLOBIN g/dL 12 5   HEMATOCRIT % 36 9   PLATELETS Thousands/uL 287   NEUTROS PCT % 70   LYMPHS PCT % 17   MONOS PCT % 9   EOS PCT % 3     Results from last 7 days   Lab Units 09/05/21  0501 09/03/21  0608   SODIUM mmol/L 141 138   POTASSIUM mmol/L 3 6 3 9   CHLORIDE mmol/L 106 103   CO2 mmol/L 28 26   BUN mg/dL 3* 6   CREATININE mg/dL 0 62 0 59*   ANION GAP mmol/L 7 9   CALCIUM mg/dL 8 7 8 7   ALBUMIN g/dL  --  3 1*   TOTAL BILIRUBIN mg/dL  --  0 62   ALK PHOS U/L  --  88   ALT U/L  --  15   AST U/L  --  11   GLUCOSE RANDOM mg/dL 131 192*         Results from last 7 days   Lab Units 09/04/21  0735   POC GLUCOSE mg/dl 203*         Results from last 7 days   Lab Units 09/04/21  0453 09/03/21  0608 09/02/21  1749   PROCALCITONIN ng/ml 0 28* 0 38* 0 32*       Lines/Drains:  Invasive Devices     Peripheral Intravenous Line            Peripheral IV 09/02/21 Left Antecubital 2 days              Imaging: Reviewed radiology reports from this admission including: chest xray and chest CT scan    Recent Cultures (last 7 days):   Results from last 7 days   Lab Units 09/02/21  1753 09/02/21  1745   BLOOD CULTURE  No Growth at 48 hrs  No Growth at 48 hrs  Last 24 Hours Medication List:   Current Facility-Administered Medications   Medication Dose Route Frequency Provider Last Rate    acetaminophen  650 mg Oral Q4H PRN Trinidad Payan MD      albuterol  2 puff Inhalation Q4H PRN Trinidad Payan MD      azithromycin  500 mg Oral Q24H Trinidad Payan MD      benzonatate  200 mg Oral TID PRN Trinidad Payan MD      cefTRIAXone  1,000 mg Intravenous Q24H Trinidad Payan MD 1,000 mg (09/05/21 0911)    enoxaparin  40 mg Subcutaneous Daily Trinidad Payan MD      guaiFENesin  1,200 mg Oral Q12H [facility] Trinidad Payan MD      ibuprofen  600 mg Oral Q6H PRN Romayne Rink, DO      loperamide  2 mg Oral TID PRN Romayne Rink, DO      ondansetron  4 mg Intravenous Q6H PRN Romayne Rink, DO      patient maintained insulin pump   Subcutaneous Daily Juice Larkin MD          Today, Patient Was Seen By: Shine Aragon DO    **Please Note: This note may have been constructed using a voice recognition system  **

## 2021-09-06 VITALS
HEIGHT: 62 IN | DIASTOLIC BLOOD PRESSURE: 67 MMHG | WEIGHT: 135.4 LBS | BODY MASS INDEX: 24.92 KG/M2 | TEMPERATURE: 98.7 F | RESPIRATION RATE: 19 BRPM | HEART RATE: 98 BPM | OXYGEN SATURATION: 97 % | SYSTOLIC BLOOD PRESSURE: 110 MMHG

## 2021-09-06 PROBLEM — J40 BRONCHITIS: Status: ACTIVE | Noted: 2021-09-06

## 2021-09-06 LAB
BASOPHILS # BLD AUTO: 0.03 THOUSANDS/ΜL (ref 0–0.1)
BASOPHILS NFR BLD AUTO: 0 % (ref 0–1)
CRP SERPL QL: 81.8 MG/L
EOSINOPHIL # BLD AUTO: 0.36 THOUSAND/ΜL (ref 0–0.61)
EOSINOPHIL NFR BLD AUTO: 3 % (ref 0–6)
ERYTHROCYTE [DISTWIDTH] IN BLOOD BY AUTOMATED COUNT: 12.2 % (ref 11.6–15.1)
HCT VFR BLD AUTO: 34.2 % (ref 34.8–46.1)
HGB BLD-MCNC: 12 G/DL (ref 11.5–15.4)
IMM GRANULOCYTES # BLD AUTO: 0.11 THOUSAND/UL (ref 0–0.2)
IMM GRANULOCYTES NFR BLD AUTO: 1 % (ref 0–2)
LYMPHOCYTES # BLD AUTO: 2.16 THOUSANDS/ΜL (ref 0.6–4.47)
LYMPHOCYTES NFR BLD AUTO: 20 % (ref 14–44)
MCH RBC QN AUTO: 32.4 PG (ref 26.8–34.3)
MCHC RBC AUTO-ENTMCNC: 35.1 G/DL (ref 31.4–37.4)
MCV RBC AUTO: 92 FL (ref 82–98)
MONOCYTES # BLD AUTO: 1.15 THOUSAND/ΜL (ref 0.17–1.22)
MONOCYTES NFR BLD AUTO: 11 % (ref 4–12)
NEUTROPHILS # BLD AUTO: 7.14 THOUSANDS/ΜL (ref 1.85–7.62)
NEUTS SEG NFR BLD AUTO: 65 % (ref 43–75)
NRBC BLD AUTO-RTO: 0 /100 WBCS
PLATELET # BLD AUTO: 328 THOUSANDS/UL (ref 149–390)
PMV BLD AUTO: 10.4 FL (ref 8.9–12.7)
RBC # BLD AUTO: 3.7 MILLION/UL (ref 3.81–5.12)
WBC # BLD AUTO: 10.95 THOUSAND/UL (ref 4.31–10.16)

## 2021-09-06 PROCEDURE — 85025 COMPLETE CBC W/AUTO DIFF WBC: CPT

## 2021-09-06 PROCEDURE — 99239 HOSP IP/OBS DSCHRG MGMT >30: CPT | Performed by: INTERNAL MEDICINE

## 2021-09-06 PROCEDURE — 86140 C-REACTIVE PROTEIN: CPT

## 2021-09-06 RX ORDER — BENZONATATE 200 MG/1
200 CAPSULE ORAL 3 TIMES DAILY PRN
Qty: 20 CAPSULE | Refills: 0 | Status: SHIPPED | OUTPATIENT
Start: 2021-09-06

## 2021-09-06 RX ORDER — CEFDINIR 300 MG/1
300 CAPSULE ORAL EVERY 12 HOURS SCHEDULED
Qty: 6 CAPSULE | Refills: 0 | Status: SHIPPED | OUTPATIENT
Start: 2021-09-06 | End: 2021-09-09

## 2021-09-06 RX ORDER — AZITHROMYCIN 500 MG/1
500 TABLET, FILM COATED ORAL EVERY 24 HOURS
Qty: 3 TABLET | Refills: 0 | Status: SHIPPED | OUTPATIENT
Start: 2021-09-06 | End: 2021-09-09

## 2021-09-06 RX ORDER — POLYETHYLENE GLYCOL 3350 17 G/17G
17 POWDER, FOR SOLUTION ORAL DAILY PRN
Status: DISCONTINUED | OUTPATIENT
Start: 2021-09-06 | End: 2021-09-06 | Stop reason: HOSPADM

## 2021-09-06 RX ORDER — DICYCLOMINE HYDROCHLORIDE 10 MG/1
10 CAPSULE ORAL
Status: DISCONTINUED | OUTPATIENT
Start: 2021-09-06 | End: 2021-09-06 | Stop reason: HOSPADM

## 2021-09-06 RX ORDER — AMOXICILLIN 250 MG
1 CAPSULE ORAL DAILY PRN
Status: DISCONTINUED | OUTPATIENT
Start: 2021-09-06 | End: 2021-09-06 | Stop reason: HOSPADM

## 2021-09-06 RX ORDER — ALBUTEROL SULFATE 90 UG/1
2 AEROSOL, METERED RESPIRATORY (INHALATION) EVERY 6 HOURS PRN
Qty: 8.5 G | Refills: 0 | Status: SHIPPED | OUTPATIENT
Start: 2021-09-06

## 2021-09-06 RX ADMIN — GUAIFENESIN 1200 MG: 600 TABLET, EXTENDED RELEASE ORAL at 09:28

## 2021-09-06 RX ADMIN — DOCUSATE SODIUM AND SENNOSIDES 1 TABLET: 8.6; 5 TABLET, FILM COATED ORAL at 09:27

## 2021-09-06 RX ADMIN — ENOXAPARIN SODIUM 40 MG: 40 INJECTION SUBCUTANEOUS at 09:28

## 2021-09-06 RX ADMIN — CEFTRIAXONE 1000 MG: 10 INJECTION, POWDER, FOR SOLUTION INTRAVENOUS at 09:28

## 2021-09-06 RX ADMIN — DICYCLOMINE HYDROCHLORIDE 10 MG: 10 CAPSULE ORAL at 09:27

## 2021-09-06 RX ADMIN — SIMETHICONE 80 MG: 80 TABLET, CHEWABLE ORAL at 06:27

## 2021-09-06 RX ADMIN — ALBUTEROL SULFATE 2 PUFF: 90 AEROSOL, METERED RESPIRATORY (INHALATION) at 09:29

## 2021-09-06 NOTE — ASSESSMENT & PLAN NOTE
Fully vaccinated pt with multiple sick contacts (mom, daughter), also fully vaccinated  x10 days mild symptoms  Nasal congestion, rhinorrhea, sore throat at outset, now resolved  Continues to have non-productive cough and test tightness with deep inspiration  HA, myalgias, arthralgias for last few days  Endorses fatigue, decreased activity, decreased taste and smell, decreased appetite with poor PO intake  Nausea without vomiting  Loose stools resolving Denies fever, chills, SOB    Pt not currently requiring supplemental O2, satting 96-99% RA   CRP elevated 121 (yesterday 147 9 09/04); procal: 0 28 (9/4)  Lab Results   Component Value Date    CRP 81 8 (H) 09/06/2021     Lab Results   Component Value Date    WBC 10 95 (H) 09/06/2021 09/06:  Wheezing and rhonchi Lung sounds on right posterior lobes have improved compared to Saturday   there is a strong suspicion that COVID virus infection is still a play due to elevated procalcitonin, and CRP   white blood cell count and  CRP have down trended    Plan:  -  Patient encouraged to quarantine for 10 days due to mild COVID infection  -  See plan under community-acquired pneumonia  -  Down trending leukocytosis  -If pt develops O2 requirement, start remdesivir, dexamethasone, systemic therapeutic anticoagulation per Covid protocol

## 2021-09-06 NOTE — PLAN OF CARE
Problem: PAIN - ADULT  Goal: Verbalizes/displays adequate comfort level or baseline comfort level  Description: Interventions:  - Encourage patient to monitor pain and request assistance  - Assess pain using appropriate pain scale  - Administer analgesics based on type and severity of pain and evaluate response  - Implement non-pharmacological measures as appropriate and evaluate response  - Consider cultural and social influences on pain and pain management  - Notify physician/advanced practitioner if interventions unsuccessful or patient reports new pain  9/5/2021 2013 by Aranza Reilly  Outcome: Progressing  9/5/2021 2012 by Aranza Reilly  Outcome: Progressing     Problem: INFECTION - ADULT  Goal: Absence or prevention of progression during hospitalization  Description: INTERVENTIONS:  - Assess and monitor for signs and symptoms of infection  - Monitor lab/diagnostic results  - Monitor all insertion sites, i e  indwelling lines, tubes, and drains  - Monitor endotracheal if appropriate and nasal secretions for changes in amount and color  - [place] appropriate cooling/warming therapies per order  - Administer medications as ordered  - Instruct and encourage patient and family to use good hand hygiene technique  - Identify and instruct in appropriate isolation precautions for identified infection/condition  9/5/2021 2013 by Aranza Reilly  Outcome: Progressing  9/5/2021 2012 by Aranza Reilly  Outcome: Progressing  Goal: Absence of fever/infection during neutropenic period  Description: INTERVENTIONS:  - Monitor WBC    9/5/2021 2013 by Aranza Reilly  Outcome: Progressing  9/5/2021 2012 by Aranza Reilly  Outcome: Progressing     Problem: SAFETY ADULT  Goal: Patient will remain free of falls  Description: INTERVENTIONS:  - Educate patient/family on patient safety including physical limitations  - Instruct patient to call for assistance with activity   - Consult OT/PT to assist with strengthening/mobility - Keep Call bell within reach  - Keep bed low and locked with side rails adjusted as appropriate  - Keep care items and personal belongings within reach  - Initiate and maintain comfort rounds  - Make Fall Risk Sign visible to staff  - Offer Toileting every  Hours, in advance of need  - Initiate/Maintain alarm  - Obtain necessary fall risk management equipment:   - Apply yellow socks and bracelet for high fall risk patients  - Consider moving patient to room near nurses station  9/5/2021 2013 by Kenney Delarosa  Outcome: Progressing  9/5/2021 2012 by Kenney Delarosa  Outcome: Progressing  Goal: Maintain or return to baseline ADL function  Description: INTERVENTIONS:  -  Assess patient's ability to carry out ADLs; assess patient's baseline for ADL function and identify physical deficits which impact ability to perform ADLs (bathing, care of mouth/teeth, toileting, grooming, dressing, etc )  - Assess/evaluate cause of self-care deficits   - Assess range of motion  - Assess patient's mobility; develop plan if impaired  - Assess patient's need for assistive devices and provide as appropriate  - Encourage maximum independence but intervene and supervise when necessary  - Involve family in performance of ADLs  - Assess for home care needs following discharge   - Consider OT consult to assist with ADL evaluation and planning for discharge  - Provide patient education as appropriate  9/5/2021 2013 by Kenney Delarosa  Outcome: Progressing  9/5/2021 2012 by Kenney Delarosa  Outcome: Progressing  Goal: Maintains/Returns to pre admission functional level  Description: INTERVENTIONS:  - Perform BMAT or MOVE assessment daily    - Set and communicate daily mobility goal to care team and patient/family/caregiver  - Collaborate with rehabilitation services on mobility goals if consulted  - Perform Range of Motion  times a day  - Reposition patient every  hours    - Dangle patient  times a day  - Stand patient  times a day  - Ambulate patient  times a day  - Out of bed to chair  times a day   - Out of bed for meals times a day  - Out of bed for toileting  - Record patient progress and toleration of activity level   9/5/2021 2013 by Richard George  Outcome: Progressing  9/5/2021 2012 by Richard George  Outcome: Progressing     Problem: DISCHARGE PLANNING  Goal: Discharge to home or other facility with appropriate resources  Description: INTERVENTIONS:  - Identify barriers to discharge w/patient and caregiver  - Arrange for needed discharge resources and transportation as appropriate  - Identify discharge learning needs (meds, wound care, etc )  - Arrange for interpretive services to assist at discharge as needed  - Refer to Case Management Department for coordinating discharge planning if the patient needs post-hospital services based on physician/advanced practitioner order or complex needs related to functional status, cognitive ability, or social support system  9/5/2021 2013 by Richard George  Outcome: Progressing  9/5/2021 2012 by Richard George  Outcome: Progressing     Problem: Knowledge Deficit  Goal: Patient/family/caregiver demonstrates understanding of disease process, treatment plan, medications, and discharge instructions  Description: Complete learning assessment and assess knowledge base    Interventions:  - Provide teaching at level of understanding  - Provide teaching via preferred learning methods  9/5/2021 2013 by Richard George  Outcome: Progressing  9/5/2021 2012 by Richard George  Outcome: Progressing     Problem: Potential for Falls  Goal: Patient will remain free of falls  Description: INTERVENTIONS:  - Educate patient/family on patient safety including physical limitations  - Instruct patient to call for assistance with activity   - Consult OT/PT to assist with strengthening/mobility   - Keep Call bell within reach  - Keep bed low and locked with side rails adjusted as appropriate  - Keep care items and personal belongings within reach  - Initiate and maintain comfort rounds  - Make Fall Risk Sign visible to staff  - Offer Toileting every  Hours, in advance of need  - Initiate/Maintain alarm  - Obtain necessary fall risk management equipment:   - Apply yellow socks and bracelet for high fall risk patients  - Consider moving patient to room near nurses station  9/5/2021 2013 by Edwin Pierson  Outcome: Progressing  9/5/2021 2012 by Edwin Pierson  Outcome: Progressing     Problem: RESPIRATORY - ADULT  Goal: Achieves optimal ventilation and oxygenation  Description: INTERVENTIONS:  - Assess for changes in respiratory status  - Assess for changes in mentation and behavior  - Position to facilitate oxygenation and minimize respiratory effort  - Oxygen administered by appropriate delivery if ordered  - Initiate smoking cessation education as indicated  - Encourage broncho-pulmonary hygiene including cough, deep breathe, Incentive Spirometry  - Assess the need for suctioning and aspirate as needed  - Assess and instruct to report SOB or any respiratory difficulty  - Respiratory Therapy support as indicated  Outcome: Progressing     Problem: GASTROINTESTINAL - ADULT  Goal: Minimal or absence of nausea and/or vomiting  Description: INTERVENTIONS:  - Administer IV fluids if ordered to ensure adequate hydration  - Maintain NPO status until nausea and vomiting are resolved  - Nasogastric tube if ordered  - Administer ordered antiemetic medications as needed  - Provide nonpharmacologic comfort measures as appropriate  - Advance diet as tolerated, if ordered  - Consider nutrition services referral to assist patient with adequate nutrition and appropriate food choices  Outcome: Progressing  Goal: Maintains or returns to baseline bowel function  Description: INTERVENTIONS:  - Assess bowel function  - Encourage oral fluids to ensure adequate hydration  - Administer IV fluids if ordered to ensure adequate hydration  - Administer ordered medications as needed  - Encourage mobilization and activity  - Consider nutritional services referral to assist patient with adequate nutrition and appropriate food choices  Outcome: Progressing  Goal: Maintains adequate nutritional intake  Description: INTERVENTIONS:  - Monitor percentage of each meal consumed  - Identify factors contributing to decreased intake, treat as appropriate  - Assist with meals as needed  - Monitor I&O, weight, and lab values if indicated  - Obtain nutrition services referral as needed  Outcome: Progressing     Problem: METABOLIC, FLUID AND ELECTROLYTES - ADULT  Goal: Electrolytes maintained within normal limits  Description: INTERVENTIONS:  - Monitor labs and assess patient for signs and symptoms of electrolyte imbalances  - Administer electrolyte replacement as ordered  - Monitor response to electrolyte replacements, including repeat lab results as appropriate  - Instruct patient on fluid and nutrition as appropriate  Outcome: Progressing

## 2021-09-06 NOTE — ASSESSMENT & PLAN NOTE
Patient noted that she had about 4-5 loose bowel movements over the last 24 hours 09/03  She says they were very loose and brown      09/04  Patient is morning she had not had a bowel movement since  Midnight 09/03  So no stool sample was able to be collected  But otherwise no bowel movements or diarrhea  09/5- no loose stool    -  C diff ordered  The patient has had bowel movements since last diarrhea episodes  Friday morning 09/03  -  Patient was given MiraLax and senna for lack of bowel movements

## 2021-09-06 NOTE — ASSESSMENT & PLAN NOTE
Pt with recent diagnosis of COVID-19, fully vaccinated, experiencing mild symptoms x10 days  Endorses non-productive cough and feeling of chest tightness with deep respiration  Denies subjective SOB and satting well on room air (96-99%)  On admission found to have leukocytosis with left shift (WBC 18 7; 87% neutrophils) and RLL ground glass opacities with suspicion for pneumonia with bronchitis and/or mucus plugging on CT Chest  Wheezing and rales on auscultation, most prominent in R lower lung fields  CTA ED chest PE study 9/2/2021: 1  No evidence for acute pulmonary embolism  2   Ill-defined groundglass alveolar opacification in the right lower lobe with associated thickening of the walls of the subserving bronchi, some of which appear to be occluded  Findings suspicious for pneumonia with associated bronchitis and/or mucous  plugging  Atypical organisms to be considered  While not excludable, findings are not typical in appearance for Covid 19 pneumonia  Given CT findings and pt's vaccination status, suspect this is more likely bacterial pneumonia secondary to viral URI with COVID-19 than it is due to a primary COVID-19 pneumonia    CXR read No acute cardiopulmonary disease  Negative blood cultures    Plan:  - negative Urine antigens: Strep, Legionella  -Monitor O2 sats and temp  -  Received Ceftriaxone 1 g IV q24h (4 days) and Azithromycin 500 mg q24h (4  Days)  - Will be discharged on  Cefdinir 300 mg twice a day for 3 days after discharge for a total of 7 days of antibiotics and  Azithromycin 500 mg daily for 3 days after discharge for a total of 7 days of antibiotics  -Mucinex 1200 mg BID, Tessalon Perles prn for cough, Albuterol inhaler prn for wheezing  - discontinuedTylenol prn for fever, HA, mild pain  At discharge  -Incentive spirometry and Flutter valve  Discontinued at discharge  -repeat CXR in 8 weeks  From discharge for resolution with PCP

## 2021-09-06 NOTE — ASSESSMENT & PLAN NOTE
In the setting of significant rhonchi/coarse breath sounds in the right lung fields  That have improved compared to yesterday' pulmonology exam  Steroid treatment was offered, however, she was hesitant due to past history of having her blood glucose hard to control in the past with her T1 DM  Thus no steroid treatment at this point  Patient agreed to albuterol inhaler for symptom management       Plan   -  Continue p r n  albuterol inhaler and will be discharged with this  As well as Tessalon Perles  -  See antibiotics plan under community-acquired pneumonia

## 2021-09-06 NOTE — ASSESSMENT & PLAN NOTE
Lab Results   Component Value Date    HGBA1C 9 4 (H) 06/01/2021     Pt diagnosed with Type I DM at age 29  Uses pt-maintained pump with Novolog for glucose control  Reports that sugars on day of presentation have been around 250, which is not atypical     Lab Results   Component Value Date    SODIUM 141 09/05/2021    K 3 6 09/05/2021     09/05/2021    CO2 28 09/05/2021    BUN 3 (L) 09/05/2021    CREATININE 0 62 09/05/2021    GLUC 131 09/05/2021    CALCIUM 8 7 09/05/2021        -Continue pt-maintained insulin pump during admission  -SSI TID with meals and QHS  -monitor blood glucose and adjust as needed  -Avoid hypoglycemia and treat per protocol  - 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

## 2021-09-06 NOTE — DISCHARGE INSTRUCTIONS
[address]    Your healthcare provider and/or public health staff have evaluated you and have determined that you do not need to remain in the hospital at this time  At this time you can be isolated at home where you will be monitored by staff from your local or state health department  You should carefully follow the prevention and isolation steps below until a healthcare provider or local or state health department says that you can return to your normal activities  Stay home except to get medical care    People who are mildly ill with COVID-19 are able to isolate at home during their illness  You should restrict activities outside your home, except for getting medical care  Do not go to work, school, or public areas  Avoid using Acute Bronchitis   WHAT YOU NEED TO KNOW:   Acute bronchitis is swelling and irritation in your lungs  It is usually caused by a virus and most often happens in the winter  Bronchitis may also be caused by bacteria or by a chemical irritant, such as smoke  DISCHARGE INSTRUCTIONS:   Return to the emergency department if:   You cough up blood  Your lips or fingernails turn blue  You feel like you are not getting enough air when you breathe  Call your doctor if:   Your symptoms do not go away or get worse, even after treatment  Your cough does not get better within 4 weeks  You have questions or concerns about your condition or care  Medicines: You may  need any of the following:  Cough suppressants  decrease your urge to cough  Decongestants  help loosen mucus in your lungs and make it easier to cough up  This can help you breathe easier  Inhalers  may be given  Your healthcare provider may give you one or more inhalers to help you breathe easier and cough less  An inhaler gives your medicine to open your airways  Ask your healthcare provider to show you how to use your inhaler correctly  Acetaminophen  decreases pain and fever   It is available without a doctor's order  Ask how much to take and how often to take it  Follow directions  Read the labels of all other medicines you are using to see if they also contain acetaminophen, or ask your doctor or pharmacist  Acetaminophen can cause liver damage if not taken correctly  Do not use more than 4 grams (4,000 milligrams) total of acetaminophen in one day  NSAIDs  help decrease swelling and pain or fever  This medicine is available with or without a doctor's order  NSAIDs can cause stomach bleeding or kidney problems in certain people  If you take blood thinner medicine, always ask your healthcare provider if NSAIDs are safe for you  Always read the medicine label and follow directions  Take your medicine as directed  Contact your healthcare provider if you think your medicine is not helping or if you have side effects  Tell him of her if you are allergic to any medicine  Keep a list of the medicines, vitamins, and herbs you take  Include the amounts, and when and why you take them  Bring the list or the pill bottles to follow-up visits  Carry your medicine list with you in case of an emergency  Self-care:   Drink liquids as directed  You may need to drink more liquids than usual to stay hydrated  Ask how much liquid to drink each day and which liquids are best for you  Use a cool mist humidifier  to increase air moisture in your home  This may make it easier for you to breathe and help decrease your cough  Get more rest   Rest helps your body to heal  Slowly start to do more each day  Rest when you feel it is needed  Avoid irritants in the air  Avoid chemicals, fumes, and dust  Wear a face mask if you must work around dust or fumes  Stay inside on days when air pollution levels are high  If you have allergies, stay inside when pollen counts are high  Do not use aerosol products, such as spray-on deodorant, bug spray, and hair spray      Do not smoke or be around others who are smoking  Nicotine and other chemicals in cigarettes and cigars can cause lung damage  Ask your healthcare provider for information if you currently smoke and need help to quit  E-cigarettes or smokeless tobacco still contain nicotine  Talk to your healthcare provider before you use these products  Prevent acute bronchitis:       Get the vaccinations you need  Ask your healthcare provider if you should get the flu or pneumonia vaccines  Prevent the spread of germs  You can decrease your risk for acute bronchitis and other illnesses by doing the following:     Wash your hands often with soap and water  Carry germ-killing hand lotion or gel with you  You can use the lotion or gel to clean your hands when soap and water are not available  Do not touch your eyes, nose, or mouth unless you have washed your hands first     Always cover your mouth when you cough to prevent the spread of germs  It is best to cough into a tissue or your shirt sleeve instead of into your hand  Ask those around you to cover their mouths when they cough  Try to avoid people who have a cold or the flu  If you are sick, stay away from others as much as possible  Follow up with your doctor as directed:  Write down questions you have so you will remember to ask them during your follow-up visits  © Copyright [employer] 2021 Information is for End User's use only and may not be sold, redistributed or otherwise used for commercial purposes  All illustrations and images included in CareNotes® are the copyrighted property of [employer]  or Jaylon Jones  The above information is an  only  It is not intended as medical advice for individual conditions or treatments  Talk to your doctor, nurse or pharmacist before following any medical regimen to see if it is safe and effective for you  public transportation, ride-sharing, or taxis      Separate yourself from other people and animals in your home    People: As much as possible, you should stay in a specific room and away from other people in your home  Also, you should use a separate bathroom, if available  Animals: You should restrict contact with pets and other animals while you are sick with COVID-19, just like you would around other people  Although there have not been reports of pets or other animals becoming sick with COVID-19, it is still recommended that people sick with COVID-19 limit contact with animals until more information is known about the virus  When possible, have another member of your household care for your animals while you are sick  If you are sick with COVID-19, avoid contact with your pet, including petting, snuggling, being kissed or licked, and sharing food  If you must care for your pet or be around animals while you are sick, wash your hands before and after you interact with pets and wear a facemask  See COVID-19 and Animals for more information  Call ahead before visiting your doctor    If you have a medical appointment, call the healthcare provider and tell them that you have or may have COVID-19  This will help the healthcare providers office take steps to keep other people from getting infected or exposed  Wear a facemask    You should wear a facemask when you are around other people (e g , sharing a room or vehicle) or pets and before you enter a healthcare providers office  If you are not able to wear a facemask (for example, because it causes trouble breathing), then people who live with you should not stay in the same room with you, or they should wear a facemask if they enter your room  Cover your coughs and sneezes    Cover your mouth and nose with a tissue when you cough or sneeze  Throw used tissues in a lined trash can   Immediately wash your hands with soap and water for at least 20 seconds or, if soap and water are not available, clean your hands with an alcohol-based hand  that contains at least 60% alcohol  Clean your hands often    Wash your hands often with soap and water for at least 20 seconds, especially after blowing your nose, coughing, or sneezing; going to the bathroom; and before eating or preparing food  If soap and water are not readily available, use an alcohol-based hand  with at least 60% alcohol, covering all surfaces of your hands and rubbing them together until they feel dry  Soap and water are the best option if hands are visibly dirty  Avoid touching your eyes, nose, and mouth with unwashed hands  Avoid sharing personal household items    You should not share dishes, drinking glasses, cups, eating utensils, towels, or bedding with other people or pets in your home  After using these items, they should be washed thoroughly with soap and water  Clean all high-touch surfaces everyday    High touch surfaces include counters, tabletops, doorknobs, bathroom fixtures, toilets, phones, keyboards, tablets, and bedside tables  Also, clean any surfaces that may have blood, stool, or body fluids on them  Use a household cleaning spray or wipe, according to the label instructions  Labels contain instructions for safe and effective use of the cleaning product including precautions you should take when applying the product, such as wearing gloves and making sure you have good ventilation during use of the product  Monitor your symptoms    Seek prompt medical attention if your illness is worsening (e g , difficulty breathing)  Before seeking care, call your healthcare provider and tell them that you have, or are being evaluated for, COVID-19  Put on a facemask before you enter the facility  These steps will help the healthcare providers office to keep other people in the office or waiting room from getting infected or exposed  Ask your healthcare provider to call the local or state health department   Persons who are placed under active monitoring or facilitated self-monitoring should follow instructions provided by their local health department or occupational health professionals, as appropriate  If you have a medical emergency and need to call 911, notify the dispatch personnel that you have, or are being evaluated for COVID-19  If possible, put on a facemask before emergency medical services arrive  Discontinuing home isolation    Patients with confirmed COVID-19 should remain under home isolation precautions until the following conditions are met:   - They have had no fever for at least 24 hours (that is one full day of no fever without the use medicine that reduces fevers)  AND  - other symptoms have improved (for example, when their cough or shortness of breath have improved)  AND  - If had mild or moderate illness, at least 10 days have passed since their symptoms first appeared or if severe illness (needed oxygen) or immunosuppressed, at least 20 days have passed since symptoms first appeared  Patients with confirmed COVID-19 should also notify close contacts (including their workplace) and ask that they self-quarantine  Currently, close contact is defined as being within 6 feet for 15 minutes or more from the period 24 hours starting 48 hours before symptom onset to the time at which the patient went into isolation  Close contacts of patients diagnosed with COVID-19 should be instructed by the patient to self-quarantine for 14 days from the last time of their last contact with the patient  Source: [employer]    Pneumonia   WHAT YOU NEED TO KNOW:   Pneumonia is an infection in your lungs caused by bacteria, viruses, fungi, or parasites  You can become infected if you come in contact with someone who is sick  You can get pneumonia if you recently had surgery or needed a ventilator to help you breathe  Pneumonia can also be caused by accidentally inhaling saliva or small pieces of food  Pneumonia may cause mild symptoms, or it can be severe and life-threatening  DISCHARGE INSTRUCTIONS:   Seek care immediately if:   · You cough up blood  · Your heart beats more than 100 beats in 1 minute  · You are very tired, confused, and cannot think clearly  · You have chest pain or trouble breathing  · Your lips or fingernails turn gray or blue  Call your doctor if:   · Your symptoms are the same or get worse 48 hours after you start antibiotics  · Your fever is not below 99°F (37 2°C) 48 hours after you start antibiotics  · You have a fever higher than 101°F (38 3°C)  · You cannot eat, or you have loss of appetite, nausea, or are vomiting  · You have questions or concerns about your condition or care  Medicines: You may need any of the following:  · Antibiotics  treat pneumonia caused by bacteria  · Acetaminophen  decreases pain and fever  It is available without a doctor's order  Ask how much to take and how often to take it  Follow directions  Read the labels of all other medicines you are using to see if they also contain acetaminophen, or ask your doctor or pharmacist  Acetaminophen can cause liver damage if not taken correctly  Do not use more than 4 grams (4,000 milligrams) total of acetaminophen in one day  · NSAIDs , such as ibuprofen, help decrease swelling, pain, and fever  This medicine is available with or without a doctor's order  NSAIDs can cause stomach bleeding or kidney problems in certain people  If you take blood thinner medicine, always ask your healthcare provider if NSAIDs are safe for you  Always read the medicine label and follow directions  · Take your medicine as directed  Contact your healthcare provider if you think your medicine is not helping or if you have side effects  Tell him or her if you are allergic to any medicine  Keep a list of the medicines, vitamins, and herbs you take  Include the amounts, and when and why you take them  Bring the list or the pill bottles to follow-up visits  Carry your medicine list with you in case of an emergency  Follow up with your doctor as directed: You will need to return for more tests  Write down your questions so you remember to ask them during your visits  Manage your symptoms:   · Rest as needed  Rest often throughout the day  Alternate times of activity with times of rest     · Drink liquids as directed  Ask how much liquid to drink each day and which liquids are best for you  Liquids help thin your mucus, which may make it easier for you to cough it up  · Do not smoke  Avoid secondhand smoke  Smoking increases your risk for pneumonia  Smoking also makes it harder for you to get better after you have had pneumonia  Ask your healthcare provider for information if you need help to quit smoking  · Limit alcohol  Women should limit alcohol to 1 drink a day  Men should limit alcohol to 2 drinks a day  A drink of alcohol is 12 ounces of beer, 5 ounces of wine, or 1½ ounces of liquor  · Use a cool mist humidifier  A humidifier will help increase air moisture in your home  This may make it easier for you to breathe and help decrease your cough  · Keep your head elevated  You may be able to breathe better if you lie down with the head of your bed up  Prevent pneumonia:   · Wash your hands often  Use soap and water every time you wash your hands  Rub your soapy hands together, lacing your fingers  Use the fingers of one hand to scrub under the nails of the other hand  Wash for at least 20 seconds  Rinse with warm, running water for several seconds  Then dry your hands with a clean towel or paper towel  Use hand  that contains alcohol if soap and water are not available  Do not touch your eyes, nose, or mouth without washing your hands first          · Cover a sneeze or cough  Use a tissue that covers your mouth and nose  Throw the tissue away in a trash can right away   Use the bend of your arm if a tissue is not available  Wash your hands well with soap and water or use a hand   Do not stand close to anyone who is sneezing or coughing  · Stay away from others until you are well  Do not go to work or other activities  Wait until your symptoms are gone or your healthcare provider says it is okay to return  · Ask about vaccines you may need  You may need a vaccine to help prevent pneumonia  Get an influenza (flu) vaccine every year as soon as recommended, usually in September or October  Flu viruses change, so it is important to get a yearly flu vaccine  © Copyright [employer] 2021 Information is for End User's use only and may not be sold, redistributed or otherwise used for commercial purposes  All illustrations and images included in CareNotes® are the copyrighted property of [employer]  or Jaylon Jones  The above information is an  only  It is not intended as medical advice for individual conditions or treatments  Talk to your doctor, nurse or pharmacist before following any medical regimen to see if it is safe and effective for you

## 2021-09-06 NOTE — ASSESSMENT & PLAN NOTE
POA: pt meeting SIRS criteria by tachycardia (116), RR (22), and leukocytosis (WBC 18 7) with confirmed COVID-19 infection and suspected bacterial pneumonia based on physical exam, labs, imaging studies  Pt is hemodynamically stable (100/56 - 132/75) and maintaining O2 sats 96-99% on RA without increased work of breathing, but experiences pain with deep breathing and still is coughing and normal RR at time of admission exam  Afebrile in the ED and without subjective fevers or chills at home  Suspect tachycardia may be appropriately compensatory in the setting of hypovolemia due to recent poor PO intake      Negative blood cx  Pending sputum cx, but patient not bringing up sputum  Procal: 0 28 (09/03 0 38)    Plan:  -abx as addressed in Community Acquired Pneumonia  -Diabetic Diet w/ SSI ( patient already takes her home insulin and  Does her own point of care glucose checks that  Nursing records on the chart  So nurse requested that patient be removed of sliding-scale insulin  At this time since patient is able to  Use her home  Insulin and nurse monitor is and adjust as NEEDED)  -  Encourage PO intake  - procal mildly elevated and cont to trend  -Tylenol prn for fever  That was discontinued at discharge

## 2021-09-07 ENCOUNTER — TRANSITIONAL CARE MANAGEMENT (OUTPATIENT)
Dept: FAMILY MEDICINE CLINIC | Facility: MEDICAL CENTER | Age: 41
End: 2021-09-07

## 2021-09-07 ENCOUNTER — TELEPHONE (OUTPATIENT)
Dept: FAMILY MEDICINE CLINIC | Facility: MEDICAL CENTER | Age: 41
End: 2021-09-07

## 2021-09-07 LAB
BACTERIA BLD CULT: NORMAL
BACTERIA BLD CULT: NORMAL

## 2021-09-07 NOTE — TELEPHONE ENCOUNTER
----- Message from Serene Lanes sent at 9/7/2021  3:52 PM EDT -----  Patient is being discharged from their inpatient hospitalization today  Patient is insured by [facility] and requires a follow up TCM appointment within 7 days of discharge  Please contact this patient to schedule appointment       Thank you    Inpatient Care Management
Call already out to patient
VOMITING

## 2021-09-14 ENCOUNTER — APPOINTMENT (OUTPATIENT)
Dept: LAB | Facility: MEDICAL CENTER | Age: 41
End: 2021-09-14
Payer: COMMERCIAL

## 2021-09-14 ENCOUNTER — OFFICE VISIT (OUTPATIENT)
Dept: FAMILY MEDICINE CLINIC | Facility: MEDICAL CENTER | Age: 41
End: 2021-09-14
Payer: COMMERCIAL

## 2021-09-14 VITALS
HEART RATE: 86 BPM | TEMPERATURE: 98.6 F | OXYGEN SATURATION: 98 % | BODY MASS INDEX: 24.62 KG/M2 | WEIGHT: 133.8 LBS | DIASTOLIC BLOOD PRESSURE: 72 MMHG | SYSTOLIC BLOOD PRESSURE: 110 MMHG | HEIGHT: 62 IN

## 2021-09-14 DIAGNOSIS — Z01.84 COVID-19 VIRUS IGM ANTIBODY TEST RESULT UNKNOWN: ICD-10-CM

## 2021-09-14 DIAGNOSIS — J18.9 COMMUNITY ACQUIRED PNEUMONIA, UNSPECIFIED LATERALITY: ICD-10-CM

## 2021-09-14 DIAGNOSIS — A41.9 SEPSIS WITHOUT ACUTE ORGAN DYSFUNCTION, DUE TO UNSPECIFIED ORGANISM (HCC): ICD-10-CM

## 2021-09-14 DIAGNOSIS — K90.0 CELIAC DISEASE: Primary | ICD-10-CM

## 2021-09-14 DIAGNOSIS — K90.0 CELIAC DISEASE: ICD-10-CM

## 2021-09-14 DIAGNOSIS — E10.65 TYPE 1 DIABETES MELLITUS WITH HYPERGLYCEMIA, WITH LONG-TERM CURRENT USE OF INSULIN (HCC): ICD-10-CM

## 2021-09-14 LAB
ANION GAP SERPL CALCULATED.3IONS-SCNC: 6 MMOL/L (ref 4–13)
BASOPHILS # BLD AUTO: 0.05 THOUSANDS/ΜL (ref 0–0.1)
BASOPHILS NFR BLD AUTO: 1 % (ref 0–1)
BUN SERPL-MCNC: 11 MG/DL (ref 5–25)
CALCIUM SERPL-MCNC: 9.3 MG/DL (ref 8.3–10.1)
CHLORIDE SERPL-SCNC: 100 MMOL/L (ref 100–108)
CO2 SERPL-SCNC: 27 MMOL/L (ref 21–32)
CREAT SERPL-MCNC: 0.64 MG/DL (ref 0.6–1.3)
CRP SERPL QL: <3 MG/L
EOSINOPHIL # BLD AUTO: 0.12 THOUSAND/ΜL (ref 0–0.61)
EOSINOPHIL NFR BLD AUTO: 1 % (ref 0–6)
ERYTHROCYTE [DISTWIDTH] IN BLOOD BY AUTOMATED COUNT: 12.5 % (ref 11.6–15.1)
GFR SERPL CREATININE-BSD FRML MDRD: 112 ML/MIN/1.73SQ M
GLUCOSE P FAST SERPL-MCNC: 262 MG/DL (ref 65–99)
HCT VFR BLD AUTO: 40.1 % (ref 34.8–46.1)
HGB BLD-MCNC: 13.9 G/DL (ref 11.5–15.4)
IMM GRANULOCYTES # BLD AUTO: 0.04 THOUSAND/UL (ref 0–0.2)
IMM GRANULOCYTES NFR BLD AUTO: 1 % (ref 0–2)
LYMPHOCYTES # BLD AUTO: 2.67 THOUSANDS/ΜL (ref 0.6–4.47)
LYMPHOCYTES NFR BLD AUTO: 31 % (ref 14–44)
MCH RBC QN AUTO: 32 PG (ref 26.8–34.3)
MCHC RBC AUTO-ENTMCNC: 34.7 G/DL (ref 31.4–37.4)
MCV RBC AUTO: 92 FL (ref 82–98)
MONOCYTES # BLD AUTO: 0.58 THOUSAND/ΜL (ref 0.17–1.22)
MONOCYTES NFR BLD AUTO: 7 % (ref 4–12)
NEUTROPHILS # BLD AUTO: 5.12 THOUSANDS/ΜL (ref 1.85–7.62)
NEUTS SEG NFR BLD AUTO: 59 % (ref 43–75)
NRBC BLD AUTO-RTO: 0 /100 WBCS
PLATELET # BLD AUTO: 398 THOUSANDS/UL (ref 149–390)
PMV BLD AUTO: 11.9 FL (ref 8.9–12.7)
POTASSIUM SERPL-SCNC: 4 MMOL/L (ref 3.5–5.3)
RBC # BLD AUTO: 4.35 MILLION/UL (ref 3.81–5.12)
SODIUM SERPL-SCNC: 133 MMOL/L (ref 136–145)
WBC # BLD AUTO: 8.58 THOUSAND/UL (ref 4.31–10.16)

## 2021-09-14 PROCEDURE — 85025 COMPLETE CBC W/AUTO DIFF WBC: CPT

## 2021-09-14 PROCEDURE — 3008F BODY MASS INDEX DOCD: CPT | Performed by: FAMILY MEDICINE

## 2021-09-14 PROCEDURE — 86140 C-REACTIVE PROTEIN: CPT

## 2021-09-14 PROCEDURE — 1111F DSCHRG MED/CURRENT MED MERGE: CPT | Performed by: FAMILY MEDICINE

## 2021-09-14 PROCEDURE — 99495 TRANSJ CARE MGMT MOD F2F 14D: CPT | Performed by: FAMILY MEDICINE

## 2021-09-14 PROCEDURE — 36415 COLL VENOUS BLD VENIPUNCTURE: CPT

## 2021-09-14 PROCEDURE — 80048 BASIC METABOLIC PNL TOTAL CA: CPT

## 2021-09-14 NOTE — PROGRESS NOTES
Maria Esther Lucero is here for MARGARITA  she was hospitalized at [facility]  From 9/3 to 9/6  For COVID viral infection  Chest x-ray did show a pneumonia  This was felt to be community-acquired she was treated with both Zithromax and cefdinir  She was treated with albuterol inhaler as well as  Cough medication  Her procalcitonin and CRP levels were quite elevated although did trend downward at discharge  Follow-up blood work was recommended as well as follow-up chest x-ray    Overall she feels much better  She is using her albuterol  inhaler although not very often  Appetite is still poor but getting  better  Energy level is still not back to normal    No cough  No new symptoms  Her diabetes is not well controlled  Last A1c was 9 6  O: /72 (BP Location: Left arm, Patient Position: Sitting, Cuff Size: Adult)   Pulse 86   Temp 98 6 °F (37 °C)   Ht 5' 2" (1 575 m)   Wt 60 7 kg (133 lb 12 8 oz)   SpO2 98%   BMI 24 47 kg/m²     she looks well;  Color is good   neck no adenopathy thyromegaly bruits   chest  Good breath sounds; Few rhonchi in the left base   cardiac regular rate rhythm without murmur   extremities no edema    Assessment   1  COVID viral infection with pneumonia- improved  2  Diabetes -poor control  Hopefully she will be improving this once she is feeling better  3  Asthma -stable    Plan     Check blood work  Follow-up chest x-ray in 7 weeks  Continue as above  Advised rest and fluids  Follow-up with endocrinology  Will extend her leave of absence from work to optimize recuperation

## 2021-09-20 ENCOUNTER — TELEPHONE (OUTPATIENT)
Dept: FAMILY MEDICINE CLINIC | Facility: MEDICAL CENTER | Age: 41
End: 2021-09-20

## 2021-09-20 NOTE — TELEPHONE ENCOUNTER
----- Message from Santiago Chan MD sent at 9/19/2021 11:01 PM EDT -----    Notify blood work is good    C-reactive protein is back to normal; CBC is okay

## 2021-11-03 ENCOUNTER — TELEPHONE (OUTPATIENT)
Dept: FAMILY MEDICINE CLINIC | Facility: MEDICAL CENTER | Age: 41
End: 2021-11-03

## 2021-11-23 ENCOUNTER — IMMUNIZATIONS (OUTPATIENT)
Dept: FAMILY MEDICINE CLINIC | Facility: MEDICAL CENTER | Age: 41
End: 2021-11-23
Payer: COMMERCIAL

## 2021-11-23 DIAGNOSIS — Z23 ENCOUNTER FOR IMMUNIZATION: Primary | ICD-10-CM

## 2021-11-23 PROCEDURE — 90686 IIV4 VACC NO PRSV 0.5 ML IM: CPT

## 2021-11-23 PROCEDURE — 90471 IMMUNIZATION ADMIN: CPT

## 2021-12-07 ENCOUNTER — TELEPHONE (OUTPATIENT)
Dept: FAMILY MEDICINE CLINIC | Facility: MEDICAL CENTER | Age: 41
End: 2021-12-07

## 2022-01-27 ENCOUNTER — VBI (OUTPATIENT)
Dept: ADMINISTRATIVE | Facility: OTHER | Age: 42
End: 2022-01-27

## 2022-09-01 ENCOUNTER — TELEPHONE (OUTPATIENT)
Dept: ADMINISTRATIVE | Facility: OTHER | Age: 42
End: 2022-09-01

## 2022-09-01 NOTE — TELEPHONE ENCOUNTER
Upon review of the In Basket request we have found/obtained the documentation  After careful review of the document we are unable to complete this request for Urine Microalbumin because the documentation does not have the result(s) needed to close the requested care gap(s)  Does not included Albumin/creatinine Ratio (Unable to perform calculation DOS 6-1-2021 DOS 6-6-2020)    Any additional questions or concerns should be emailed to the Practice Liaisons via [email or web address] email, please do not reply via In Basket      Thank you  Abigail Mendes MA

## 2022-09-01 NOTE — TELEPHONE ENCOUNTER
----- Message from Siddhartha Patel sent at 8/31/2022  1:58 PM EDT -----  Regarding: care gap request  08/31/22 1:58 PM    Hello, our patient attached above has had Mammogram completed/performed  Please assist in updating the patient chart by pulling the Care Everywhere (CE) document  The date of service is 2021       Thank you,  Gurpreet Rema, MA  [facility]

## 2022-09-01 NOTE — TELEPHONE ENCOUNTER
----- Message from Deonte Sawyer sent at 8/31/2022  1:59 PM EDT -----  Regarding: care gap request  08/31/22 1:59 PM    Hello, our patient attached above has had Urine Microalbumin completed/performed  Please assist in updating the patient chart by pulling the Care Everywhere (CE) document  The date of service is 2021       Thank you,  Raquel Woods MA  [facility]

## 2022-09-01 NOTE — TELEPHONE ENCOUNTER
Upon review of the In Basket request we were able to locate, review, and update the patient chart as requested for Mammogram     Any additional questions or concerns should be emailed to the Practice Liaisons via [email or web address] email, please do not reply via In Basket      Thank you  Leonard Tesfaye MA

## 2022-09-29 ENCOUNTER — TELEPHONE (OUTPATIENT)
Dept: FAMILY MEDICINE CLINIC | Facility: CLINIC | Age: 42
End: 2022-09-29

## 2022-09-29 NOTE — TELEPHONE ENCOUNTER
LMOM for Sherlyn to call back, she has not been seen in over a year  She does see [facility] but we need to know if she is still coming here  Was a previous Dr Nicola Castellano patient

## 2022-10-12 PROBLEM — A41.9 SEPSIS (HCC): Status: RESOLVED | Noted: 2021-09-02 | Resolved: 2022-10-12

## 2022-10-12 PROBLEM — J18.9 CAP (COMMUNITY ACQUIRED PNEUMONIA): Status: RESOLVED | Noted: 2021-09-02 | Resolved: 2022-10-12

## 2022-11-29 ENCOUNTER — OFFICE VISIT (OUTPATIENT)
Dept: FAMILY MEDICINE CLINIC | Facility: MEDICAL CENTER | Age: 42
End: 2022-11-29

## 2022-11-29 VITALS
WEIGHT: 145 LBS | SYSTOLIC BLOOD PRESSURE: 106 MMHG | HEART RATE: 80 BPM | BODY MASS INDEX: 26.68 KG/M2 | DIASTOLIC BLOOD PRESSURE: 72 MMHG | OXYGEN SATURATION: 98 % | TEMPERATURE: 98.4 F | HEIGHT: 62 IN | RESPIRATION RATE: 16 BRPM

## 2022-11-29 DIAGNOSIS — R39.9 UTI SYMPTOMS: ICD-10-CM

## 2022-11-29 DIAGNOSIS — N30.90 CYSTITIS: Primary | ICD-10-CM

## 2022-11-29 DIAGNOSIS — R30.0 DYSURIA: ICD-10-CM

## 2022-11-29 LAB
SL AMB  POCT GLUCOSE, UA: ABNORMAL
SL AMB LEUKOCYTE ESTERASE,UA: ABNORMAL
SL AMB POCT BILIRUBIN,UA: ABNORMAL
SL AMB POCT BLOOD,UA: ABNORMAL
SL AMB POCT CLARITY,UA: CLEAR
SL AMB POCT COLOR,UA: YELLOW
SL AMB POCT KETONES,UA: ABNORMAL
SL AMB POCT NITRITE,UA: ABNORMAL
SL AMB POCT PH,UA: 6
SL AMB POCT SPECIFIC GRAVITY,UA: 1.03
SL AMB POCT URINE PROTEIN: ABNORMAL
SL AMB POCT UROBILINOGEN: ABNORMAL

## 2022-11-29 RX ORDER — NITROFURANTOIN 25; 75 MG/1; MG/1
100 CAPSULE ORAL 2 TIMES DAILY
Qty: 10 CAPSULE | Refills: 0 | Status: SHIPPED | OUTPATIENT
Start: 2022-11-29 | End: 2022-12-04

## 2022-11-29 RX ORDER — PHENAZOPYRIDINE HYDROCHLORIDE 100 MG/1
100 TABLET, FILM COATED ORAL 3 TIMES DAILY PRN
Qty: 10 TABLET | Refills: 0 | Status: SHIPPED | OUTPATIENT
Start: 2022-11-29

## 2022-11-29 NOTE — PROGRESS NOTES
Assessment/Plan:    Take antibiotic until course is complete  May use Pyridium as needed 3 times daily for symptom relief  Stay well-hydrated  Call the office if symptoms worsen or fail to improve  1  Cystitis  Advised patient to take antibiotic until course is complete  Stay well-hydrated  May use Pyridium as needed for symptom relief  - nitrofurantoin (MACROBID) 100 mg capsule; Take 1 capsule (100 mg total) by mouth 2 (two) times a day for 5 days  Dispense: 10 capsule; Refill: 0  - phenazopyridine (PYRIDIUM) 100 mg tablet; Take 1 tablet (100 mg total) by mouth 3 (three) times a day as needed for bladder spasms  Dispense: 10 tablet; Refill: 0    2  UTI symptoms  Component      Latest Ref Rng & Units 11/29/2022   Leukocytes, UA       1+   Nitrite, UA       neg   SL AMB POCT UROBILINOGEN       neg   POCT URINE PROTEIN       trace   pH, UA       6 0   Blood, UA       neg   SL AMB SPECIFIC GRAVITY-URINE       1 030   Ketones, UA       3+   BILIRUBIN,UA       neg   Glucose, UA       3+   Color, UA       yellow   Clarity, UA       clear   Send urine for culture  - POCT urine dip  - Urine culture    3  Dysuria  May use Pyridium 3 times a day as needed for 3 days  - phenazopyridine (PYRIDIUM) 100 mg tablet; Take 1 tablet (100 mg total) by mouth 3 (three) times a day as needed for bladder spasms  Dispense: 10 tablet; Refill: 0      Subjective:      Patient ID: Anthony Arredondo is a 40 y o  female  40year old female presents with complaints of urinary frequency, burning, pressure x 3 5 weeks  She has tried otc medications however symptoms were only temporarily relieved  Denies fever, abdominal pain, flank pain, hematuria, vaginal discharge or pain  Denies history of UTIs and kidney infection  She is also reporting chronic right ear itchiness  She would like it checked because in the past she developed a staph infection in her ear  Denies pain, discharge, fever         The following portions of the patient's history were reviewed and updated as appropriate: allergies, current medications, past medical history, past social history, past surgical history and problem list     Review of Systems   Constitutional: Negative  HENT:        Right ear itchiness     Eyes: Negative  Respiratory: Negative  Cardiovascular: Negative  Gastrointestinal: Negative  Endocrine: Negative  Genitourinary: Positive for dysuria, frequency and urgency  Negative for difficulty urinating, flank pain, hematuria, menstrual problem, pelvic pain, vaginal bleeding, vaginal discharge and vaginal pain  Musculoskeletal: Negative  Skin: Negative  Allergic/Immunologic: Negative  Neurological: Negative  Hematological: Negative  Psychiatric/Behavioral: Negative  Objective:      /72 (BP Location: Left arm, Patient Position: Sitting, Cuff Size: Adult)   Pulse 80   Temp 98 4 °F (36 9 °C)   Resp 16   Ht 5' 2" (1 575 m)   Wt 65 8 kg (145 lb)   SpO2 98%   BMI 26 52 kg/m²          Physical Exam  Vitals and nursing note reviewed  Constitutional:       General: She is not in acute distress  Appearance: Normal appearance  She is normal weight  She is not ill-appearing  HENT:      Head: Normocephalic and atraumatic  Right Ear: Tympanic membrane, ear canal and external ear normal       Left Ear: Tympanic membrane, ear canal and external ear normal    Eyes:      Conjunctiva/sclera: Conjunctivae normal       Pupils: Pupils are equal, round, and reactive to light  Cardiovascular:      Rate and Rhythm: Normal rate and regular rhythm  Pulses: Normal pulses  Heart sounds: Normal heart sounds  No murmur heard  Pulmonary:      Effort: Pulmonary effort is normal  No respiratory distress  Breath sounds: Normal breath sounds  No stridor  No wheezing, rhonchi or rales  Abdominal:      General: Abdomen is flat  Bowel sounds are normal       Palpations: Abdomen is soft  Tenderness:  There is no abdominal tenderness  There is no right CVA tenderness, left CVA tenderness, guarding or rebound  Musculoskeletal:         General: Normal range of motion  Cervical back: Normal range of motion and neck supple  Skin:     General: Skin is warm and dry  Neurological:      General: No focal deficit present  Mental Status: She is alert and oriented to person, place, and time  Mental status is at baseline  Psychiatric:         Mood and Affect: Mood normal          Behavior: Behavior normal          Thought Content:  Thought content normal                     Deangelo Denney

## 2022-11-29 NOTE — PATIENT INSTRUCTIONS
Take antibiotic until course is complete  Stay well-hydrated  May use Pyridium 3 times a day as needed for urinary discomfort      Call the office if symptoms worsen or persist

## 2022-12-01 LAB — BACTERIA UR CULT: ABNORMAL

## 2022-12-27 ENCOUNTER — RA CDI HCC (OUTPATIENT)
Dept: OTHER | Facility: HOSPITAL | Age: 42
End: 2022-12-27

## 2022-12-27 NOTE — PROGRESS NOTES
F32 A Depression- found in active problem list - Can Depression be further classified using more specific code     [facility] [address]  coding opportunities          Chart Reviewed number of suggestions sent to Provider: 1     Patients Insurance        Commercial Insurance: [employer]

## 2023-03-29 ENCOUNTER — NURSE TRIAGE (OUTPATIENT)
Dept: FAMILY MEDICINE CLINIC | Facility: MEDICAL CENTER | Age: 43
End: 2023-03-29

## 2023-03-29 NOTE — TELEPHONE ENCOUNTER
Please, reach out to patient  She is over due for her CPX, is she establishing with one of our new PCP's? If so please, assist her with scheduling

## 2023-05-29 ENCOUNTER — PREPPED CHART (OUTPATIENT)
Dept: URBAN - METROPOLITAN AREA CLINIC 6 | Facility: CLINIC | Age: 43
End: 2023-05-29

## 2023-06-14 ENCOUNTER — DIABETIC EYE EXAM (OUTPATIENT)
Dept: URBAN - METROPOLITAN AREA CLINIC 6 | Facility: CLINIC | Age: 43
End: 2023-06-14

## 2023-06-14 DIAGNOSIS — E10.9: ICD-10-CM

## 2023-06-14 LAB
LEFT EYE DIABETIC RETINOPATHY: NORMAL
RIGHT EYE DIABETIC RETINOPATHY: NORMAL

## 2023-06-14 PROCEDURE — 92014 COMPRE OPH EXAM EST PT 1/>: CPT

## 2023-06-14 ASSESSMENT — VISUAL ACUITY
OS_PH: 20/25
OU_SC: J1+
OS_SC: 20/30
OU_SC: 20/25
OD_SC: 20/25

## 2023-06-14 ASSESSMENT — TONOMETRY
OS_IOP_MMHG: 16
OD_IOP_MMHG: 17

## 2023-06-22 ENCOUNTER — VBI (OUTPATIENT)
Dept: ADMINISTRATIVE | Facility: OTHER | Age: 43
End: 2023-06-22

## 2023-07-28 ENCOUNTER — TELEPHONE (OUTPATIENT)
Dept: GASTROENTEROLOGY | Facility: CLINIC | Age: 43
End: 2023-07-28

## 2023-09-08 ENCOUNTER — TELEMEDICINE (OUTPATIENT)
Dept: FAMILY MEDICINE CLINIC | Facility: MEDICAL CENTER | Age: 43
End: 2023-09-08
Payer: COMMERCIAL

## 2023-09-08 DIAGNOSIS — U07.1 COVID-19: Primary | ICD-10-CM

## 2023-09-08 PROCEDURE — 99213 OFFICE O/P EST LOW 20 MIN: CPT

## 2023-09-08 RX ORDER — MOLNUPIRAVIR 200 MG/1
800 CAPSULE ORAL EVERY 12 HOURS
Qty: 40 CAPSULE | Refills: 0 | Status: SHIPPED | OUTPATIENT
Start: 2023-09-08 | End: 2023-09-13

## 2023-09-08 NOTE — PROGRESS NOTES
COVID-19 Outpatient Progress Note    Assessment/Plan:    Problem List Items Addressed This Visit    None  Visit Diagnoses     COVID-19    -  Primary    Relevant Medications    molnupiravir (Lagevrio) 200 mg capsule         Disposition:     Discussed symptom directed medication options with patient. Molnupiravir sent to pharmacy, discussed possible side effects including diarrhea with patient. Advised patient to use Tylenol or ibuprofen as needed for fever, chills or body aches and add decongestant. Advised patient to rest and stay well-hydrated. Advised patient to proceed to the emergency department if she develops chest pain, shortness of breath, fever that is not responsive to antipyretics. Patient verbalized understanding of the above and would like to proceed with antiviral at this time. Patient meets criteria for Molnupiravir and they have been counseled according to EUA documentation provided by the FDA. After discussion, patient agrees to treatment. Benton Streeter is an investigational medicine used to treat mild-to-moderate COVID-19 in adults with positive results of direct SARS-CoV-2 viral testing, and who are at high risk for progressing to severe COVID-19 including hospitalization or death, and for whom other COVID-19 treatment options authorized by the FDA are not accessible or clinically appropriate. The FDA has authorized the emergency use of molnupiravir for the treatment of mild-tomoderate COVID-19 in adults under an EUA. Benton Streeter is not authorized:  - for use in people less than 25years of age.  - for prevention of COVID-19.  - for people needing hospitalization for COVID-19.  - for use for longer than 5 consecutive days    What is the most important information I should know about molnupiravir? Benton Streeter may cause serious side effects, including:    Benton Streeter may cause harm to your unborn baby.  It is not known if molnupiravir will harm your baby if you take molnupiravir during pregnancy. - Marilu Quesada is not recommended for use in pregnancy. - Marilu Quesada has not been studied in pregnancy. Marilu Quesada was studied in pregnant animals only. When molnupiravir was given to pregnant animals, molnupiravir caused harm to their unborn babies.  - You and your healthcare provider may decide that you should take molnupiravir duringpregnancy if there are no other COVID-19 treatment options authorized by the FDA that are accessible or clinically appropriate for you. - If you and your healthcare provider decide that you should take molnupiravir during pregnancy, you and your healthcare provider should discuss the known and potential benefits and the potential risks of taking molnupiravir during pregnancy. For individuals who are able to become pregnant:  - You should use a reliable method of birth control (contraception) consistently and correctly during treatment with molnupiravir and for 4 days after the last dose of molnupiravir. Talk to your healthcare provider about reliable birth control methods. - Before starting treatment with molnupiravir your healthcare provider may do a pregnancy test to see if you are pregnant before starting treatment with molnupiravir. - Tell your healthcare provider right away if you become pregnant or think you may be pregnant during treatment with molnupiravir. Pregnancy Surveillance Program:  - There is a pregnancy surveillance program for individuals who take molnupiravir during pregnancy. The purpose of this program is to collect information about the health of you and your baby.  Talk to your healthcare provider about how to take part in this program.  - If you take molnupiravir during pregnancy and you agree to participate in the pregnancy surveillance program and allow your healthcare provider to share your information with Kelly Barbosa, then your healthcare provider will report your use of molnupiravir during pregnancy to [address] [address]. by calling [phone] or [email or web address]. For individuals who are sexually active with partners who are able to become pregnant:  - It is not known if molnupiravir can affect sperm. While the risk is regarded as low, animal studies to fully assess the potential for molnupiravir to affect the babies of males treated with molnupiravir have not been completed. A reliable method of birth control (contraception) should be used consistently and correctly during treatment with molnupiravir and for at least 3 months after the last dose. The risk to sperm beyond 3 months is not known. Studies to understand the risk to sperm beyond 3 months are ongoing. Talk to your healthcare provider about reliable birth control methods. Talk to your healthcare provider if you have questions or concerns about how molnupiravir may affect sperm. How do I take molnupiravir? - Take molnupiravir exactly as your healthcare provider tells you to take it. - Take 4 capsules of molnupiravir every 12 hours (for example, at 8 am and at 8 pm)  - Take molnupiravir for 5 days. It is important that you complete the full 5 days of treatment with molnupiravir. Do not stop taking molnupiravir before you complete the full 5 days of treatment, even if you feel better. - Take molnupiravir with or without food. - You should stay in isolation for as long as your healthcare provider tells you to. Talk to your healthcare provider if you are not sure about how to properly isolate while you have COVID-19.  - Swallow molnupiravir capsules whole. Do not open, break, or crush the capsules. If you cannot swallow capsules whole, tell your healthcare provider. What to do if you miss a dose:  - If it has been less than 10 hours since the missed dose, take it as soon as you remember  - If it has been more than 10 hours since the missed dose, skip the missed dose and take your dose at the next scheduled time.   - Do not double the dose of molnupiravir to make up for a missed dose. What are the important possible side effects of molnupiravir? Possible side effects of molnupiravir are:  - See, Isidro Lin is the most important information I should know about molnupiravir?”  - Diarrhea  - Nausea  - Dizziness    These are not all the possible side effects of molnupiravir. Not many people have taken molnupiravir. Serious and unexpected side effects may happen. This medicine is still being studied, so it is possible that all of the risks are not known at this time. What other treatment choices are there? Like Jose L Padilla may allow for the emergency use of other medicines to treat people with COVID-19. Go to https://RedT/ for more information. It is your choice to be treated or not to be treated with molnupiravir. Should you decide not to take it, it will not change your standard medical care. What if I am breastfeeding? Breastfeeding is not recommended during treatment with molnupiravir and for 4 days after the last dose of molnupiravir. If you are breastfeeding or plan to breastfeed, talk to your healthcare provider about your options and specific situation before taking molnupiravir. How do I report side effects with molnupiravir? Contact your healthcare provider if you have any side effects that bother you or do not go away. Report side effects to FDA MedWatch at www.fda.gov/medwatch or call 2-659-LIR-7118 (1-814.487.4991). How should I store [address] [place] [address]? - Store molnupiravir capsules at room temperature between 68°F to 77°F (20°C to 25°C). - Keep molnupiravir and all medicines out of the reach of children and pets.     Full fact sheet for patients, parents, and caregivers can be found at: [employer]    I have spent a total time of 20 minutes on the day of the encounter for this patient including risks and benefits of treatment options, instructions for management, importance of treatment compliance and documenting in the medical record. Encounter provider: ARTHUR Robledo     Provider located at: [facility] [id number]     Recent Visits  No visits were found meeting these conditions. Showing recent visits within past 7 days and meeting all other requirements  Today's Visits  Date Type Provider Dept   09/08/23 Telemedicine ARTHUR Robledo [facility]   Showing today's visits and meeting all other requirements  Future Appointments  No visits were found meeting these conditions. Showing future appointments within next 150 days and meeting all other requirements     This virtual check-in was done via [address] [place] [address] and patient was informed that this is a secure, HIPAA-compliant platform. She agrees to proceed. Patient agrees to participate in a virtual check in via telephone or video visit instead of presenting to the office to address urgent/immediate medical needs. Patient is aware this is a billable service. She acknowledged consent and understanding of privacy and security of the video platform. The patient has agreed to participate and understands they can discontinue the visit at any time. After connecting through [facility], the patient was identified by name and date of birth. Gerard Johnston was informed that this was a telemedicine visit and that the exam was being conducted confidentially over secure lines. My office door was closed. No one else was in the room. Gerard Johnston acknowledged consent and understanding of privacy and security of the telemedicine visit. I informed the patient that I have reviewed her record in Epic and presented the opportunity for her to ask any questions regarding the visit today.  The patient agreed to participate. Verification of patient location:  Patient is located in the following state in which I hold an active license: [place]    Subjective:   Gerard Johnston is a 44 y.o. female who is concerned about COVID-19. Patient's symptoms include chills, fatigue, nasal congestion, rhinorrhea, sore throat, cough (mild), diarrhea (improved since yesterday), myalgias and headache. Patient denies fever, anosmia, loss of taste, shortness of breath, chest tightness, abdominal pain, nausea and vomiting.     - Date of symptom onset: 9/7/2023      COVID-19 vaccination status: Fully vaccinated (primary series)    Exposure:   Contact with a person who is under investigation (PUI) for or who is positive for COVID-19 within the last 14 days?: No    Hospitalized recently for fever and/or lower respiratory symptoms?: No      Currently a healthcare worker that is involved in direct patient care?: No      Works in a special setting where the risk of COVID-19 transmission may be high? (this may include long-term care, correctional and retirement facilities; homeless shelters; assisted-living facilities and group homes.): No      Resident in a special setting where the risk of COVID-19 transmission may be high? (this may include long-term care, correctional and retirement facilities; homeless shelters; assisted-living facilities and group homes.): No      Tested positive today for covid via home test.  Took theraflu yesterday, did not make her feel very well therefore she does not want to take anymore. Has coricidin but did not yet take it. Patient reports this is her third time testing positive for COVID, first time she was hospitalized for 1 week and developed pneumonia. She tells me her symptoms when she has COVID seem to last fairly long and she is agreeable to antiviral medication.     Lab Results   Component Value Date    SARSCOV2 Positive (A) 08/27/2021    SARSCOV2 Not Detected 07/30/2020       Review of Systems Constitutional: Positive for chills and fatigue. Negative for fever. HENT: Positive for congestion, rhinorrhea and sore throat. Respiratory: Positive for cough (mild). Negative for chest tightness and shortness of breath. Gastrointestinal: Positive for diarrhea (improved since yesterday). Negative for abdominal pain, nausea and vomiting. Musculoskeletal: Positive for myalgias. Neurological: Positive for headaches. Current Outpatient Medications on File Prior to Visit   Medication Sig   • CONTOUR NEXT TEST test strip TESTING EIGHT TIMES DAILY   • glucagon 1 MG injection Inject as directed   • insulin aspart, w/niacinamide, (FIASP) 100 Units/mL injection pen FOR PUMP FAILURE 40 UNITS DAILY   • insulin detemir (LEVEMIR) 100 units/mL subcutaneous injection Inject under the skin daily at bedtime   • Ketone Blood Test STRP by In Vitro route   • levonorgestrel (MIRENA) 20 MCG/24HR IUD 20 mcg/day   • NOVOLOG 100 UNIT/ML injection USE UP TO 50 UNITS VIA PUMP DAILY   • phenazopyridine (PYRIDIUM) 100 mg tablet Take 1 tablet (100 mg total) by mouth 3 (three) times a day as needed for bladder spasms   • Urine Glucose-Ketones Test STRP Check urine ketones when blood sugar >300, if +ve call MD       Objective: There were no vitals taken for this visit. Physical Exam  Constitutional:       General: She is not in acute distress. Appearance: Normal appearance. She is not ill-appearing. HENT:      Head: Normocephalic and atraumatic. Pulmonary:      Effort: Pulmonary effort is normal. No respiratory distress. Neurological:      Mental Status: She is alert. Psychiatric:         Mood and Affect: Mood normal.         Behavior: Behavior normal.         Thought Content: Thought content normal.     Limited physical exam as this was a virtual visit.   ARTHUR Naranjo

## 2023-09-12 ENCOUNTER — OFFICE VISIT (OUTPATIENT)
Dept: FAMILY MEDICINE CLINIC | Facility: MEDICAL CENTER | Age: 43
End: 2023-09-12
Payer: COMMERCIAL

## 2023-09-12 VITALS
DIASTOLIC BLOOD PRESSURE: 68 MMHG | SYSTOLIC BLOOD PRESSURE: 118 MMHG | WEIGHT: 143 LBS | TEMPERATURE: 98.5 F | HEIGHT: 62 IN | BODY MASS INDEX: 26.31 KG/M2 | HEART RATE: 83 BPM | OXYGEN SATURATION: 99 % | RESPIRATION RATE: 16 BRPM

## 2023-09-12 DIAGNOSIS — H65.92 FLUID LEVEL BEHIND TYMPANIC MEMBRANE OF LEFT EAR: Primary | ICD-10-CM

## 2023-09-12 PROCEDURE — 99213 OFFICE O/P EST LOW 20 MIN: CPT

## 2023-09-12 RX ORDER — FLUTICASONE PROPIONATE 50 MCG
1 SPRAY, SUSPENSION (ML) NASAL DAILY
Qty: 11.1 ML | Refills: 0 | Status: SHIPPED | OUTPATIENT
Start: 2023-09-12

## 2023-09-12 NOTE — PROGRESS NOTES
Assessment/Plan:    Treatment as below. 1. Fluid level behind tympanic membrane of left ear  Advised patient to use Flonase once daily over the next 1 to 2 weeks. Tylenol or ibuprofen as needed for pain relief. Call the office if symptoms worsen or fail to improve. - fluticasone (FLONASE) 50 mcg/act nasal spray; 1 spray into each nostril daily  Dispense: 11.1 mL; Refill: 0      Subjective:      Patient ID: Yennifer Nguyen is a 44 y.o. female. 81-year-old female presents with complaints of left ear pain since yesterday. Denies sore throat, cough, fever. Still has some congestion, taking decongestant 2x daily. She tested positive for COVID last week and was started on molnupiravir on 9/8, still taking medication at this time. Earache   There is pain in the left ear. This is a new problem. The current episode started yesterday. There has been no fever. The following portions of the patient's history were reviewed and updated as appropriate: allergies, current medications, past family history, past medical history, past surgical history and problem list.    Review of Systems   Constitutional: Negative. HENT: Positive for congestion and ear pain (left). Eyes: Negative. Respiratory: Negative. Cardiovascular: Negative. Gastrointestinal: Negative. Endocrine: Negative. Genitourinary: Negative. Musculoskeletal: Negative. Skin: Negative. Allergic/Immunologic: Negative. Neurological: Negative. Hematological: Negative. Psychiatric/Behavioral: Negative. Objective:      /68 (BP Location: Left arm, Patient Position: Sitting, Cuff Size: Adult)   Pulse 83   Temp 98.5 °F (36.9 °C)   Resp 16   Ht 5' 2" (1.575 m)   Wt 64.9 kg (143 lb)   LMP  (Exact Date)   SpO2 99%   BMI 26.16 kg/m²          Physical Exam  Vitals and nursing note reviewed. Constitutional:       General: She is not in acute distress. Appearance: Normal appearance.  She is not ill-appearing. HENT:      Head: Normocephalic and atraumatic. Right Ear: Tympanic membrane, ear canal and external ear normal.      Left Ear: Ear canal and external ear normal. Tenderness present. A middle ear effusion is present. Nose: Congestion present. Mouth/Throat:      Mouth: Mucous membranes are moist.      Pharynx: Oropharynx is clear. No oropharyngeal exudate or posterior oropharyngeal erythema. Cardiovascular:      Rate and Rhythm: Normal rate and regular rhythm. Pulses: Normal pulses. Heart sounds: Normal heart sounds. Pulmonary:      Effort: Pulmonary effort is normal.      Breath sounds: Normal breath sounds. Musculoskeletal:         General: Normal range of motion. Cervical back: Normal range of motion and neck supple. Lymphadenopathy:      Cervical: No cervical adenopathy. Skin:     General: Skin is warm and dry. Neurological:      General: No focal deficit present. Mental Status: She is alert and oriented to person, place, and time. Mental status is at baseline. Psychiatric:         Mood and Affect: Mood normal.         Behavior: Behavior normal.         Thought Content:  Thought content normal.                    Raphael Napoles

## 2023-10-04 DIAGNOSIS — H65.92 FLUID LEVEL BEHIND TYMPANIC MEMBRANE OF LEFT EAR: ICD-10-CM

## 2023-10-04 RX ORDER — FLUTICASONE PROPIONATE 50 MCG
SPRAY, SUSPENSION (ML) NASAL
Qty: 48 ML | Refills: 1 | Status: SHIPPED | OUTPATIENT
Start: 2023-10-04

## 2023-10-25 ENCOUNTER — PREP FOR PROCEDURE (OUTPATIENT)
Dept: GASTROENTEROLOGY | Facility: CLINIC | Age: 43
End: 2023-10-25

## 2023-10-25 ENCOUNTER — VBI (OUTPATIENT)
Dept: ADMINISTRATIVE | Facility: OTHER | Age: 43
End: 2023-10-25

## 2023-10-25 DIAGNOSIS — K21.9 GASTROESOPHAGEAL REFLUX DISEASE, UNSPECIFIED WHETHER ESOPHAGITIS PRESENT: ICD-10-CM

## 2023-10-25 DIAGNOSIS — K22.70 BARRETT'S ESOPHAGUS WITHOUT DYSPLASIA: Primary | ICD-10-CM

## 2023-10-26 ENCOUNTER — TELEPHONE (OUTPATIENT)
Age: 43
End: 2023-10-26

## 2023-10-26 NOTE — TELEPHONE ENCOUNTER
Scheduled date of EGD(as of today):11.29.23    Physician performing EGD:Isabel    Location of EGD:[facility]    Instructions reviewed with patient by:[facility] and sent to chart

## 2023-10-28 DIAGNOSIS — H65.92 FLUID LEVEL BEHIND TYMPANIC MEMBRANE OF LEFT EAR: ICD-10-CM

## 2023-10-30 RX ORDER — FLUTICASONE PROPIONATE 50 MCG
SPRAY, SUSPENSION (ML) NASAL
Qty: 48 ML | Refills: 2 | Status: SHIPPED | OUTPATIENT
Start: 2023-10-30

## 2023-11-17 ENCOUNTER — OFFICE VISIT (OUTPATIENT)
Dept: FAMILY MEDICINE CLINIC | Facility: MEDICAL CENTER | Age: 43
End: 2023-11-17
Payer: COMMERCIAL

## 2023-11-17 ENCOUNTER — TELEPHONE (OUTPATIENT)
Age: 43
End: 2023-11-17

## 2023-11-17 VITALS
BODY MASS INDEX: 25.95 KG/M2 | HEART RATE: 81 BPM | SYSTOLIC BLOOD PRESSURE: 118 MMHG | WEIGHT: 141 LBS | RESPIRATION RATE: 16 BRPM | TEMPERATURE: 96.8 F | DIASTOLIC BLOOD PRESSURE: 74 MMHG | HEIGHT: 62 IN

## 2023-11-17 DIAGNOSIS — R05.1 ACUTE COUGH: ICD-10-CM

## 2023-11-17 DIAGNOSIS — J01.00 ACUTE NON-RECURRENT MAXILLARY SINUSITIS: Primary | ICD-10-CM

## 2023-11-17 PROCEDURE — 99213 OFFICE O/P EST LOW 20 MIN: CPT

## 2023-11-17 RX ORDER — ALBUTEROL SULFATE 90 UG/1
2 AEROSOL, METERED RESPIRATORY (INHALATION) EVERY 6 HOURS PRN
Qty: 8.5 G | Refills: 0 | Status: SHIPPED | OUTPATIENT
Start: 2023-11-17

## 2023-11-17 RX ORDER — AMOXICILLIN AND CLAVULANATE POTASSIUM 875; 125 MG/1; MG/1
1 TABLET, FILM COATED ORAL EVERY 12 HOURS SCHEDULED
Qty: 14 TABLET | Refills: 0 | Status: SHIPPED | OUTPATIENT
Start: 2023-11-17 | End: 2023-11-24

## 2023-11-17 NOTE — PROGRESS NOTES
Assessment/Plan:       1. Acute non-recurrent maxillary sinusitis  Augmentin as prescribed. Advised about concurrent probiotic use. Flonase once daily, one spray per nostril.  - amoxicillin-clavulanate (AUGMENTIN) 875-125 mg per tablet; Take 1 tablet by mouth every 12 (twelve) hours for 7 days  Dispense: 14 tablet; Refill: 0    2. Acute cough  Treatment as above. May use albuterol inhaler as needed for cough, sob, wheezing.  - albuterol (ProAir HFA) 90 mcg/act inhaler; Inhale 2 puffs every 6 (six) hours as needed for wheezing  Dispense: 8.5 g; Refill: 0      Subjective:      Patient ID: Manpreet Saldana is a 44 y.o. female. 44year old female presents with complaints of sore throat, post nasal drip, dry and productive cough, congestion, headache, sinus pain and pressure x 5 days. Symptoms worse in the morning and at night. She also reports some wheezing at nighttime. Denies fever, chills, body aches, ear pain, cp, sob. Recently around daughter and daughters friend both of which were sick with similar sx. No otc medications have been tried as of yet. Did not test for covid, reports having covid 2 months ago. The following portions of the patient's history were reviewed and updated as appropriate: allergies, current medications, past family history, past medical history, past surgical history, and problem list.    Review of Systems   Constitutional: Negative. HENT:  Positive for congestion, postnasal drip, sinus pressure, sinus pain and sore throat. Eyes: Negative. Respiratory:  Positive for cough and wheezing. Cardiovascular: Negative. Gastrointestinal: Negative. Endocrine: Negative. Genitourinary: Negative. Musculoskeletal: Negative. Skin: Negative. Neurological:  Positive for headaches. Hematological: Negative. Psychiatric/Behavioral: Negative.            Objective:      /74 (BP Location: Left arm, Patient Position: Sitting, Cuff Size: Adult)   Pulse 81 Temp (!) 96.8 °F (36 °C)   Resp 16   Ht 5' 2" (1.575 m)   Wt 64 kg (141 lb)   BMI 25.79 kg/m²          Physical Exam  Vitals and nursing note reviewed. Constitutional:       General: She is not in acute distress. Appearance: Normal appearance. She is normal weight. She is not ill-appearing. HENT:      Head: Normocephalic and atraumatic. Right Ear: Tympanic membrane, ear canal and external ear normal.      Left Ear: Tympanic membrane, ear canal and external ear normal.      Nose: Congestion present. Right Sinus: No maxillary sinus tenderness or frontal sinus tenderness. Left Sinus: Maxillary sinus tenderness present. No frontal sinus tenderness. Mouth/Throat:      Mouth: Mucous membranes are moist.      Pharynx: Oropharynx is clear. No oropharyngeal exudate or posterior oropharyngeal erythema. Eyes:      General:         Right eye: No discharge. Left eye: No discharge. Conjunctiva/sclera: Conjunctivae normal.      Pupils: Pupils are equal, round, and reactive to light. Cardiovascular:      Rate and Rhythm: Normal rate and regular rhythm. Pulses: Normal pulses. Heart sounds: Normal heart sounds. Pulmonary:      Effort: Pulmonary effort is normal. No respiratory distress. Breath sounds: Normal breath sounds. No stridor. No wheezing, rhonchi or rales. Musculoskeletal:      Cervical back: Normal range of motion and neck supple. Lymphadenopathy:      Cervical: No cervical adenopathy. Skin:     General: Skin is warm and dry. Neurological:      General: No focal deficit present. Mental Status: She is alert and oriented to person, place, and time. Mental status is at baseline. Psychiatric:         Mood and Affect: Mood normal.         Behavior: Behavior normal.         Thought Content:  Thought content normal.                    Valentino Lamy

## 2023-11-17 NOTE — PATIENT INSTRUCTIONS
Augmentin twice daily x7 days. Add daily probiotic during course of treatment with antibiotic. Flonase, 1 spray per nostril once daily. Albuterol inhaler as needed for cough, wheezing. Return if symptoms worsen or fail to improve.

## 2023-11-17 NOTE — TELEPHONE ENCOUNTER
Patient scheduled for Same day at 1:20pm with Ashley Mckay. Patient wants to know if this can be a virtual appointment. I wanted to check to see if this was ok with provider as patient thinks she has bronchitis and I the provider may want to listen to her lungs to evaluate. Called and spoke with Brown Hewitt at the office to confirm. She stated she will pass message to Ashley Mckay to Junior.     Patient aware it is an IN office visit for now and office will call with answer if it can be changed to virtual.

## 2023-11-29 ENCOUNTER — ANESTHESIA (OUTPATIENT)
Dept: GASTROENTEROLOGY | Facility: AMBULATORY SURGERY CENTER | Age: 43
End: 2023-11-29

## 2023-11-29 ENCOUNTER — HOSPITAL ENCOUNTER (OUTPATIENT)
Dept: GASTROENTEROLOGY | Facility: AMBULATORY SURGERY CENTER | Age: 43
Discharge: HOME | End: 2023-11-29
Payer: COMMERCIAL

## 2023-11-29 ENCOUNTER — ANESTHESIA EVENT (OUTPATIENT)
Dept: GASTROENTEROLOGY | Facility: AMBULATORY SURGERY CENTER | Age: 43
End: 2023-11-29

## 2023-11-29 VITALS
WEIGHT: 140 LBS | BODY MASS INDEX: 25.76 KG/M2 | SYSTOLIC BLOOD PRESSURE: 107 MMHG | HEIGHT: 62 IN | TEMPERATURE: 96.6 F | DIASTOLIC BLOOD PRESSURE: 75 MMHG | HEART RATE: 72 BPM | RESPIRATION RATE: 18 BRPM | OXYGEN SATURATION: 100 %

## 2023-11-29 DIAGNOSIS — K21.9 GASTROESOPHAGEAL REFLUX DISEASE, UNSPECIFIED WHETHER ESOPHAGITIS PRESENT: ICD-10-CM

## 2023-11-29 DIAGNOSIS — K22.70 BARRETT'S ESOPHAGUS WITHOUT DYSPLASIA: ICD-10-CM

## 2023-11-29 DIAGNOSIS — B37.9 YEAST INFECTION: Primary | ICD-10-CM

## 2023-11-29 LAB
EXT PREGNANCY TEST URINE: NEGATIVE
EXT. CONTROL: NORMAL

## 2023-11-29 PROCEDURE — 43239 EGD BIOPSY SINGLE/MULTIPLE: CPT | Performed by: INTERNAL MEDICINE

## 2023-11-29 PROCEDURE — 88305 TISSUE EXAM BY PATHOLOGIST: CPT | Performed by: PATHOLOGY

## 2023-11-29 RX ORDER — LIDOCAINE HYDROCHLORIDE 20 MG/ML
INJECTION, SOLUTION EPIDURAL; INFILTRATION; INTRACAUDAL; PERINEURAL AS NEEDED
Status: DISCONTINUED | OUTPATIENT
Start: 2023-11-29 | End: 2023-11-29

## 2023-11-29 RX ORDER — SODIUM CHLORIDE, SODIUM LACTATE, POTASSIUM CHLORIDE, CALCIUM CHLORIDE 600; 310; 30; 20 MG/100ML; MG/100ML; MG/100ML; MG/100ML
INJECTION, SOLUTION INTRAVENOUS CONTINUOUS PRN
Status: DISCONTINUED | OUTPATIENT
Start: 2023-11-29 | End: 2023-11-29

## 2023-11-29 RX ORDER — FLUCONAZOLE 150 MG/1
150 TABLET ORAL ONCE
Qty: 1 TABLET | Refills: 0 | Status: SHIPPED | OUTPATIENT
Start: 2023-11-29 | End: 2023-11-29

## 2023-11-29 RX ORDER — FLUCONAZOLE 100 MG/1
100 TABLET ORAL DAILY
COMMUNITY
End: 2023-11-29

## 2023-11-29 RX ORDER — SODIUM CHLORIDE, SODIUM LACTATE, POTASSIUM CHLORIDE, CALCIUM CHLORIDE 600; 310; 30; 20 MG/100ML; MG/100ML; MG/100ML; MG/100ML
125 INJECTION, SOLUTION INTRAVENOUS CONTINUOUS
Status: DISCONTINUED | OUTPATIENT
Start: 2023-11-29 | End: 2023-12-03 | Stop reason: HOSPADM

## 2023-11-29 RX ORDER — PROPOFOL 10 MG/ML
INJECTION, EMULSION INTRAVENOUS AS NEEDED
Status: DISCONTINUED | OUTPATIENT
Start: 2023-11-29 | End: 2023-11-29

## 2023-11-29 RX ADMIN — SODIUM CHLORIDE, SODIUM LACTATE, POTASSIUM CHLORIDE, CALCIUM CHLORIDE: 600; 310; 30; 20 INJECTION, SOLUTION INTRAVENOUS at 08:27

## 2023-11-29 RX ADMIN — PROPOFOL 200 MG: 10 INJECTION, EMULSION INTRAVENOUS at 08:54

## 2023-11-29 RX ADMIN — PROPOFOL 100 MG: 10 INJECTION, EMULSION INTRAVENOUS at 08:56

## 2023-11-29 RX ADMIN — PROPOFOL 50 MG: 10 INJECTION, EMULSION INTRAVENOUS at 09:00

## 2023-11-29 RX ADMIN — LIDOCAINE HYDROCHLORIDE 100 MG: 20 INJECTION, SOLUTION EPIDURAL; INFILTRATION; INTRACAUDAL; PERINEURAL at 08:54

## 2023-11-29 NOTE — ANESTHESIA POSTPROCEDURE EVALUATION
Post-Op Assessment Note    CV Status:  Stable  Pain Score: 0    Pain management: adequate       Mental Status:  Alert and awake   Hydration Status:  Euvolemic   PONV Controlled:  Controlled   Airway Patency:  Patent     Post Op Vitals Reviewed: Yes    No anethesia notable event occurred.     Staff: CRNA, Anesthesiologist               BP 98/63 (11/29/23 0909)    Temp     Pulse 72 (11/29/23 0909)   Resp 16 (11/29/23 0909)    SpO2 99 % (11/29/23 0909)

## 2023-11-29 NOTE — H&P
History and Physical -  [facility]  Manpreet Saldana 44 y.o. female MRN: [id number]                  HPI: Manpreet Saldana is a 44y.o. year old female who presents for history of chronic gastroesophageal reflux disease and possible Simons's esophagus. REVIEW OF SYSTEMS: Per the HPI, and otherwise unremarkable.     Historical Information   Past Medical History:   Diagnosis Date    Celiac disease     Diabetes mellitus ([address]) 2021    IDDM, uses insulin pump;  current blood sugar 256 at 0827 hr 23; feels well    Encounter for smoking cessation counseling     GERD (gastroesophageal reflux disease)     History of vertigo     last episode several years ago per patient  23    Hypertension     during pregnancy 14 years ago;-not current issue  23    Type 1 diabetes ([address])      Past Surgical History:   Procedure Laterality Date    CERVICAL BIOPSY  W/ LOOP ELECTRODE EXCISION      COLONOSCOPY      UPPER GASTROINTESTINAL ENDOSCOPY      WISDOM TOOTH EXTRACTION       Social History   Social History     Substance and Sexual Activity   Alcohol Use Yes    Alcohol/week: 1.0 standard drink of alcohol    Types: 1 Glasses of wine per week    Comment: social; occasional     Social History     Substance and Sexual Activity   Drug Use No     Social History     Tobacco Use   Smoking Status Former    Types: Cigarettes    Quit date: 9/10/2018    Years since quittin.2   Smokeless Tobacco Never     Family History   Problem Relation Age of Onset    Hypertension Mother     Hypertension Father     Pancreatic cancer Maternal Grandfather     Stroke Paternal Grandfather     Melanoma Paternal Aunt     Skin cancer Family        Meds/Allergies       Current Outpatient Medications:     CONTOUR NEXT TEST test strip    fluconazole (DIFLUCAN) 100 mg tablet    insulin aspart, w/niacinamide, (FIASP) 100 Units/mL injection pen    insulin detemir (LEVEMIR) 100 units/mL subcutaneous injection    levonorgestrel (MIRENA) 20 MCG/24HR IUD    NOVOLOG 100 UNIT/ML injection    glucagon 1 MG injection    Ketone Blood Test STRP    Urine Glucose-Ketones Test STRP    Current Facility-Administered Medications:     lactated ringers infusion, 125 mL/hr, Intravenous, Continuous    Facility-Administered Medications Ordered in Other Encounters:     lactated ringers infusion, , Intravenous, Continuous PRN, New Bag at 11/29/23 0827    No Known Allergies    Objective     /79   Pulse 80 Comment: NSR  Temp (!) 96.6 °F (35.9 °C) (Skin)   Resp 18   Ht 5' 2" (1.575 m)   Wt 63.5 kg (140 lb)   SpO2 100%   BMI 25.61 kg/m²       PHYSICAL EXAM    Gen: NAD  Head: NCAT  CV: RRR  CHEST: Clear  ABD: soft, NT/ND  EXT: no edema      ASSESSMENT/PLAN:  This is a 44y.o. year old female here for EGD, and she is stable and optimized for her procedure.

## 2023-11-29 NOTE — ANESTHESIA PREPROCEDURE EVALUATION
Procedure:  EGD    Relevant Problems   ANESTHESIA (within normal limits)      ENDO   (+) Type 1 diabetes mellitus with hyperglycemia, with long-term current use of insulin (HCC)   (+) Type I diabetes mellitus, uncontrolled      NEURO/PSYCH   (+) Anxiety   (+) Depression        Physical Exam    Airway    Mallampati score: II  TM Distance: >3 FB  Neck ROM: full     Dental   No notable dental hx     Cardiovascular      Pulmonary      Other Findings  post-pubertal.      Anesthesia Plan  ASA Score- 2     Anesthesia Type- IV sedation with anesthesia with ASA Monitors. Additional Monitors:     Airway Plan:     Comment: Patient with insulin pump and BG sensor. BG ~240 pre-op. Reviewed diabetic management with patient. Will proceed with case. .       Plan Factors-Exercise tolerance (METS): >4 METS. Chart reviewed. EKG reviewed. Existing labs reviewed. Patient summary reviewed. Patient is not a current smoker. Induction- intravenous. Postoperative Plan-     Informed Consent- Anesthetic plan and risks discussed with patient. I personally reviewed this patient with the CRNA. Discussed and agreed on the Anesthesia Plan with the CRNA. Trini See

## 2023-11-29 NOTE — INTERVAL H&P NOTE
H&P reviewed. After examining the patient I find no changes in the patients condition since the H&P had been written.     Vitals:    11/29/23 0830   BP: 116/79   Pulse: 80   Resp: 18   Temp: (!) 96.6 °F (35.9 °C)   SpO2: 100%

## 2023-11-29 NOTE — DISCHARGE SUMMARY
Discharge Summary - Gurmeet Call 44 y.o. female MRN: [id number]    Unit/Bed#:  Encounter: [id number]    Admission Date: 11/29/2023    Admitting Diagnosis: Simons's esophagus without dysplasia [K22.70]  Gastroesophageal reflux disease, unspecified whether esophagitis present [K21.9]    HPI: History of reflux esophagitis and possible Simons's esophagus. Questionable celiac disease. Procedures Performed: No orders of the defined types were placed in this encounter. Summary of Hospital Course: Tolerated procedure well    Significant Findings, Care, Treatment and Services Provided: Small sliding hiatal hernia and questionable Simons's esophagus noted. Complications: None    Discharge Diagnosis: See above    Medical Problems       Resolved Problems  Date Reviewed: 11/17/2023   None         Condition at Discharge: good         Discharge instructions/Information to patient and family:   See after visit summary for information provided to patient and family. Provisions for Follow-Up Care:  See after visit summary for information related to follow-up care and any pertinent home health orders.       PCP: ARTHUR Garrison    Disposition: Home

## 2023-12-04 PROCEDURE — 88305 TISSUE EXAM BY PATHOLOGIST: CPT | Performed by: PATHOLOGY

## 2023-12-28 ENCOUNTER — VBI (OUTPATIENT)
Dept: ADMINISTRATIVE | Facility: OTHER | Age: 43
End: 2023-12-28

## 2024-02-21 PROBLEM — B30.9 ACUTE VIRAL CONJUNCTIVITIS OF BOTH EYES: Status: RESOLVED | Noted: 2020-07-28 | Resolved: 2024-02-21

## 2024-06-13 ENCOUNTER — TELEPHONE (OUTPATIENT)
Dept: FAMILY MEDICINE CLINIC | Facility: MEDICAL CENTER | Age: 44
End: 2024-06-13

## 2024-06-13 NOTE — TELEPHONE ENCOUNTER
Pt reused NV for DM foot exam ot stated she sees endo and has an upcoming appt sept 4th with lvneisha endo

## 2024-07-10 ENCOUNTER — OFFICE VISIT (OUTPATIENT)
Dept: FAMILY MEDICINE CLINIC | Facility: MEDICAL CENTER | Age: 44
End: 2024-07-10
Payer: COMMERCIAL

## 2024-07-10 VITALS
SYSTOLIC BLOOD PRESSURE: 112 MMHG | DIASTOLIC BLOOD PRESSURE: 76 MMHG | WEIGHT: 140.2 LBS | RESPIRATION RATE: 16 BRPM | TEMPERATURE: 98.8 F | OXYGEN SATURATION: 100 % | BODY MASS INDEX: 25.64 KG/M2 | HEART RATE: 82 BPM

## 2024-07-10 DIAGNOSIS — L08.9 SKIN INFECTION: Primary | ICD-10-CM

## 2024-07-10 DIAGNOSIS — L65.9 HAIR LOSS: ICD-10-CM

## 2024-07-10 PROCEDURE — 99213 OFFICE O/P EST LOW 20 MIN: CPT | Performed by: STUDENT IN AN ORGANIZED HEALTH CARE EDUCATION/TRAINING PROGRAM

## 2024-07-10 RX ORDER — SULFAMETHOXAZOLE AND TRIMETHOPRIM 800; 160 MG/1; MG/1
1 TABLET ORAL 2 TIMES DAILY
Qty: 14 TABLET | Refills: 0 | Status: SHIPPED | OUTPATIENT
Start: 2024-07-10 | End: 2024-07-10 | Stop reason: SDUPTHER

## 2024-07-10 RX ORDER — SULFAMETHOXAZOLE AND TRIMETHOPRIM 800; 160 MG/1; MG/1
1 TABLET ORAL 2 TIMES DAILY
Qty: 14 TABLET | Refills: 0 | Status: SHIPPED | OUTPATIENT
Start: 2024-07-10 | End: 2024-07-16

## 2024-07-10 NOTE — PROGRESS NOTES
"  Counts include 234 beds at the [facility] - Clinic Note  Celio Haider DO, 07/10/24     Sherlyn Teran MRN: [id number] : 1980 Age: 44 y.o.     Assessment/Plan     1. Skin infection    -44-year-old female with type 1 diabetes mellitus and celiac disease presents with abrupt onset localized left facial swelling and erythema, left external jugular lymphadenopathy  -Start antibiotic per orders, advised about concurrent probiotic use  -Follow-up CBC  - CBC (Includes Diff/Plt) (Refl); Future  - sulfamethoxazole-trimethoprim (BACTRIM DS) 800-160 mg per tablet; Take 1 tablet by mouth 2 (two) times a day for 7 days  Dispense: 14 tablet; Refill: 0  -Close 5-day follow-up recheck, advised patient about signs and symptoms in which case to contact sooner    2. Hair loss    - TSH, 3rd generation with Free T4 reflex; Future    Sherlyn Teran acknowledged understanding of treatment plan, all questions answered.    Subjective      Sherlyn Teran is a 44 y.o. female who presents for acute visit.  She is a patient of ARTHUR Valiente. \"  Yesterday felt like my face hurt, thought I slept on bracelet, woke up this morning I could see something happening on my face, lymph node swollen hurts\".  Left-sided facial swelling \" warm to touch\".  Patient denies any trauma to face.  No recent bug bite, facials, new facial wash.  No recent URI.  Patient describes that pain \" extends a bit to my ear, I would not say my eye or ear is painful\".  She denies any sore throat or dentalgia.  No scalp lesions.  No fever or chills.  Patient also mentions that she does occasionally experience \" bit of swelling feet and ankles\" and has been experiencing hair loss.  She mentions the after mentioned is not new and not as concerning that she felt she needed an appointment sooner for that.    The following portions of the patient's history were reviewed and updated as appropriate: allergies, current medications, past family history, past medical history, " past social history, past surgical history and problem list.     Past Medical History:   Diagnosis Date    Celiac disease     Diabetes mellitus (HCC) 2021    IDDM, uses insulin pump;  current blood sugar 256 at 0827 hr 23; feels well    Encounter for smoking cessation counseling     GERD (gastroesophageal reflux disease)     History of vertigo     last episode several years ago per patient  23    Hypertension     during pregnancy 14 years ago;-not current issue  23    Type 1 diabetes (HCC)        No Known Allergies    Past Surgical History:   Procedure Laterality Date    CERVICAL BIOPSY  W/ LOOP ELECTRODE EXCISION      COLONOSCOPY      UPPER GASTROINTESTINAL ENDOSCOPY      WISDOM TOOTH EXTRACTION         Family History   Problem Relation Age of Onset    Hypertension Mother     Hypertension Father     Pancreatic cancer Maternal Grandfather     Stroke Paternal Grandfather     Melanoma Paternal Aunt     Skin cancer Family        Social History     Socioeconomic History    Marital status:      Spouse name: None    Number of children: None    Years of education: None    Highest education level: None   Occupational History    None   Tobacco Use    Smoking status: Former     Current packs/day: 0.00     Types: Cigarettes     Quit date: 9/10/2018     Years since quittin.8    Smokeless tobacco: Never   Vaping Use    Vaping status: Never Used   Substance and Sexual Activity    Alcohol use: Yes     Alcohol/week: 1.0 standard drink of alcohol     Types: 1 Glasses of wine per week     Comment: social; occasional    Drug use: No    Sexual activity: None   Other Topics Concern    None   Social History Narrative    Per [employer]: caffeine use.      Social Determinants of Health     Financial Resource Strain: Not on file   Food Insecurity: Not on file   Transportation Needs: Not on file   Physical Activity: Not on file   Stress: Not on file   Social Connections: Not on file   Intimate Partner  Violence: Not on file   Housing Stability: Not on file       Current Outpatient Medications   Medication Sig Dispense Refill    CONTOUR NEXT TEST test strip TESTING EIGHT TIMES DAILY      glucagon 1 MG injection Inject as directed      insulin aspart, w/niacinamide, (FIASP) 100 Units/mL injection pen       insulin detemir (LEVEMIR) 100 units/mL subcutaneous injection Inject under the skin daily at bedtime      Ketone Blood Test STRP by In Vitro route      levonorgestrel (MIRENA) 20 MCG/24HR IUD 20 mcg/day      NOVOLOG 100 UNIT/ML injection USE UP TO 50 UNITS VIA PUMP DAILY 50 mL 0    Urine Glucose-Ketones Test STRP Check urine ketones when blood sugar >300, if +ve call  strip 3     No current facility-administered medications for this visit.       Review of Systems     As noted in HPI    Objective      /76 (BP Location: Left arm, Patient Position: Sitting, Cuff Size: Standard)   Pulse 82   Temp 98.8 °F (37.1 °C) (Temporal)   Resp 16   Wt 63.6 kg (140 lb 3.2 oz)   SpO2 100%   BMI 25.64 kg/m²     Physical Exam  Vitals reviewed.   Constitutional:       General: She is not in acute distress.     Appearance: She is not ill-appearing or toxic-appearing.   HENT:      Head: Normocephalic and atraumatic.      Comments: No tenderness upon palpation bilateral periorbital regions     Left Ear: Tympanic membrane, ear canal and external ear normal.      Ears:      Comments: Cerumen obstructing full view right tympanic membrane     Mouth/Throat:      Mouth: Mucous membranes are moist.      Dentition: No dental abscesses.      Pharynx: Oropharynx is clear.   Eyes:      General:         Right eye: No discharge.         Left eye: No discharge.      Extraocular Movements: Extraocular movements intact.      Conjunctiva/sclera: Conjunctivae normal.      Pupils: Pupils are equal, round, and reactive to light.      Comments: No pain with extraocular eye movements   Cardiovascular:      Rate and Rhythm: Normal rate and  "regular rhythm.      Pulses: Normal pulses.      Heart sounds: Normal heart sounds.   Pulmonary:      Effort: Pulmonary effort is normal.      Breath sounds: Normal breath sounds.   Musculoskeletal:      Cervical back: Neck supple.   Lymphadenopathy:      Cervical: Cervical adenopathy (left external jugular) present.   Skin:     General: Skin is warm and dry.      Comments: Slight erythema and localized facial swelling left maxillary region, slight tactile warmth   Neurological:      Mental Status: She is alert and oriented to person, place, and time.   Psychiatric:         Mood and Affect: Mood normal.         Behavior: Behavior normal.         Thought Content: Thought content normal.             Some portions of this record may have been generated with voice recognition software. There may be translation, syntax, or grammatical errors. Occasional wrong word or \"sound-a-like\" substitutions may have occurred due to the inherent limitations of the voice recognition software. Read the chart carefully and recognize, using context, where substations may have occurred. If you have any questions, please contact the dictating provider for clarification or correction, as needed.  "

## 2024-07-11 LAB
BASOPHILS # BLD AUTO: 0 THOU/CMM (ref 0–0.1)
BASOPHILS NFR BLD AUTO: 1 %
DIFFERENTIAL METHOD BLD: NORMAL
EOSINOPHIL # BLD AUTO: 0.2 THOU/CMM (ref 0–0.5)
EOSINOPHIL NFR BLD AUTO: 2 %
ERYTHROCYTE [DISTWIDTH] IN BLOOD BY AUTOMATED COUNT: 12.4 % (ref 12–16)
HCT VFR BLD AUTO: 42 % (ref 35–43)
HGB BLD-MCNC: 14 G/DL (ref 11.5–14.5)
LYMPHOCYTES # BLD AUTO: 2 THOU/CMM (ref 1–3)
LYMPHOCYTES NFR BLD AUTO: 23 %
MCH RBC QN AUTO: 30.8 PG (ref 26–34)
MCHC RBC AUTO-ENTMCNC: 33.3 G/DL (ref 32–37)
MCV RBC AUTO: 93 FL (ref 80–100)
MONOCYTES # BLD AUTO: 0.9 THOU/CMM (ref 0.3–1)
MONOCYTES NFR BLD AUTO: 11 %
NEUTROPHILS # BLD AUTO: 5.5 THOU/CMM (ref 1.8–7.8)
NEUTROPHILS NFR BLD AUTO: 63 %
PLATELET # BLD AUTO: 200 THOU/CMM (ref 140–350)
PMV BLD REES-ECKER: 10.8 FL (ref 7.5–11.3)
RBC # BLD AUTO: 4.53 MILL/CMM (ref 3.7–4.7)
TSH SERPL-ACNC: 2.87 UIU/ML (ref 0.45–5.33)
WBC # BLD AUTO: 8.6 THOU/CMM (ref 4–10)

## 2024-07-12 ENCOUNTER — OFFICE VISIT (OUTPATIENT)
Dept: FAMILY MEDICINE CLINIC | Facility: MEDICAL CENTER | Age: 44
End: 2024-07-12
Payer: COMMERCIAL

## 2024-07-12 VITALS
WEIGHT: 139.6 LBS | RESPIRATION RATE: 18 BRPM | SYSTOLIC BLOOD PRESSURE: 100 MMHG | HEART RATE: 80 BPM | TEMPERATURE: 98.3 F | DIASTOLIC BLOOD PRESSURE: 80 MMHG | OXYGEN SATURATION: 97 % | BODY MASS INDEX: 25.53 KG/M2

## 2024-07-12 DIAGNOSIS — L08.9 SKIN INFECTION: Primary | ICD-10-CM

## 2024-07-12 PROCEDURE — 99214 OFFICE O/P EST MOD 30 MIN: CPT

## 2024-07-12 RX ORDER — CEPHALEXIN 250 MG/1
500 CAPSULE ORAL EVERY 6 HOURS SCHEDULED
Qty: 56 CAPSULE | Refills: 0 | Status: SHIPPED | OUTPATIENT
Start: 2024-07-12 | End: 2024-07-19

## 2024-07-12 NOTE — PROGRESS NOTES
Assessment/Plan:       1. Skin infection  Discontinue Bactrim.  Start cephalexin as directed below.  Advised about cool compresses several times for today.  Advised about use of ibuprofen to help with pain and inflammation.  Advised about Benadryl at bedtime to help with itch relief and anti-histamine.   Follow-up next week in office for recheck as scheduled.  Advised to proceed to the emergency department if progressively worsens over the weekend, may need IV abx in that case.   - cephalexin (KEFLEX) 250 mg capsule; Take 2 capsules (500 mg total) by mouth every 6 (six) hours for 7 days  Dispense: 56 capsule; Refill: 0      Subjective:      Patient ID: Sherlyn Teran is a 44 y.o. female.    44-year-old female presents with complaint of left-sided facial swelling and redness x 3 days which has been worsening despite use of antibiotics as prescribed 2 days ago.  She was seen here in office 2 days ago by Dr. Haider who prescribed 7-day course of Bactrim which she has been taking for the past 2 days.  She also had left-sided lymphadenopathy 2 days ago in office which has since been improving.  She tells me otherwise she feels fine, denies any other associated symptoms, fever, chills.  She has also been keeping an eye on her BS because of being diabetic and reports they have been good.   No other concerns.           The following portions of the patient's history were reviewed and updated as appropriate: allergies, past family history, past medical history, past social history, past surgical history, and problem list.    Review of Systems   Constitutional: Negative.    HENT: Negative.     Eyes: Negative.    Respiratory: Negative.     Cardiovascular: Negative.    Gastrointestinal: Negative.    Endocrine: Negative.    Genitourinary: Negative.    Musculoskeletal: Negative.    Skin:         Left sided facial swelling and redness   Allergic/Immunologic: Negative.    Neurological: Negative.    Hematological: Negative.     Psychiatric/Behavioral: Negative.           Objective:      /80 (BP Location: Left arm, Patient Position: Sitting, Cuff Size: Standard)   Pulse 80   Temp 98.3 °F (36.8 °C) (Temporal)   Resp 18   Wt 63.3 kg (139 lb 9.6 oz)   SpO2 97%   BMI 25.53 kg/m²          Physical Exam  Vitals and nursing note reviewed.   Constitutional:       General: She is not in acute distress.     Appearance: Normal appearance. She is not ill-appearing.   HENT:      Head: Normocephalic and atraumatic.   Cardiovascular:      Rate and Rhythm: Normal rate.   Pulmonary:      Effort: Pulmonary effort is normal.   Lymphadenopathy:      Cervical: Cervical adenopathy (left sided) present.   Skin:     Findings: Erythema (swelling and warm to the touch (left cheek)) present.   Neurological:      General: No focal deficit present.      Mental Status: She is alert and oriented to person, place, and time. Mental status is at baseline.   Psychiatric:         Mood and Affect: Mood normal.         Behavior: Behavior normal.         Thought Content: Thought content normal.                    ARTHUR Valiente

## 2024-07-15 ENCOUNTER — TELEPHONE (OUTPATIENT)
Age: 44
End: 2024-07-15

## 2024-07-16 ENCOUNTER — OFFICE VISIT (OUTPATIENT)
Dept: FAMILY MEDICINE CLINIC | Facility: MEDICAL CENTER | Age: 44
End: 2024-07-16
Payer: COMMERCIAL

## 2024-07-16 VITALS
WEIGHT: 142 LBS | DIASTOLIC BLOOD PRESSURE: 70 MMHG | HEART RATE: 85 BPM | TEMPERATURE: 97.8 F | BODY MASS INDEX: 25.97 KG/M2 | OXYGEN SATURATION: 98 % | SYSTOLIC BLOOD PRESSURE: 110 MMHG | RESPIRATION RATE: 18 BRPM

## 2024-07-16 DIAGNOSIS — L08.9 SKIN INFECTION: Primary | ICD-10-CM

## 2024-07-16 PROCEDURE — 99213 OFFICE O/P EST LOW 20 MIN: CPT

## 2024-07-16 NOTE — PROGRESS NOTES
Assessment/Plan:       1. Skin infection  Symptoms improving.  Continue Cephalexin until course is complete.   Continue with cool compresses.   Add anti-histamine daily such as Claritin or zyrtec.   Call or send my chart message in 3 days with update and picture.       Subjective:      Patient ID: Sherlyn Teran is a 44 y.o. female.    44-year-old female presents for 4-day follow-up skin infection.  At her last office visit on 7/12, left-sided facial swelling and redness had worsened since initial visit with Dr. Haider on 7/10 therefore I advised patient discontinue Bactrim and start cephalexin.  I also advised about cool compresses, ibuprofen and Benadryl as needed.  Today, swelling and redness are still present but improved. Skin is no longer hot to the touch and left sided lymphadenopathy has completely resolved.   She is feeling good. She took one dose of benadryl since her last visit and reports itchiness has also resolved.  She is on day 4 of cephalexin, has 3 days remaining.        The following portions of the patient's history were reviewed and updated as appropriate: allergies, current medications, past family history, past medical history, past surgical history, and problem list.    Review of Systems   Constitutional: Negative.    HENT: Negative.     Eyes: Negative.    Respiratory: Negative.     Cardiovascular: Negative.    Gastrointestinal: Negative.    Endocrine: Negative.    Genitourinary: Negative.    Musculoskeletal: Negative.    Skin:         Left sided facial swelling and redness, Improving     Allergic/Immunologic: Negative.    Neurological: Negative.    Hematological: Negative.    Psychiatric/Behavioral: Negative.           Objective:      /70 (BP Location: Left arm, Patient Position: Sitting, Cuff Size: Standard)   Pulse 85   Temp 97.8 °F (36.6 °C) (Temporal)   Resp 18   Wt 64.4 kg (142 lb)   SpO2 98%   BMI 25.97 kg/m²          Physical Exam  Vitals and nursing note reviewed.    Constitutional:       General: She is not in acute distress.     Appearance: Normal appearance. She is normal weight. She is not ill-appearing.   HENT:      Head: Normocephalic and atraumatic.   Cardiovascular:      Rate and Rhythm: Normal rate.   Pulmonary:      Effort: Pulmonary effort is normal.   Musculoskeletal:      Cervical back: Normal range of motion and neck supple.   Lymphadenopathy:      Cervical: No cervical adenopathy.   Skin:     General: Skin is warm and dry.      Comments: Left sided facial redness and swelling still present, mild. Improving.   Skin is cool to the touch.      Neurological:      General: No focal deficit present.      Mental Status: She is alert and oriented to person, place, and time. Mental status is at baseline.   Psychiatric:         Mood and Affect: Mood normal.         Behavior: Behavior normal.         Thought Content: Thought content normal.                    ARTHUR Valiente

## 2024-10-09 ENCOUNTER — RA CDI HCC (OUTPATIENT)
Dept: OTHER | Facility: HOSPITAL | Age: 44
End: 2024-10-09

## 2024-10-18 ENCOUNTER — TELEPHONE (OUTPATIENT)
Dept: ADMINISTRATIVE | Facility: OTHER | Age: 44
End: 2024-10-18

## 2024-10-18 ENCOUNTER — OFFICE VISIT (OUTPATIENT)
Dept: FAMILY MEDICINE CLINIC | Facility: MEDICAL CENTER | Age: 44
End: 2024-10-18
Payer: COMMERCIAL

## 2024-10-18 VITALS
HEART RATE: 85 BPM | TEMPERATURE: 98.6 F | BODY MASS INDEX: 25.65 KG/M2 | RESPIRATION RATE: 17 BRPM | HEIGHT: 62 IN | OXYGEN SATURATION: 100 % | SYSTOLIC BLOOD PRESSURE: 104 MMHG | WEIGHT: 139.4 LBS | DIASTOLIC BLOOD PRESSURE: 78 MMHG

## 2024-10-18 DIAGNOSIS — F32.0 MAJOR DEPRESSIVE DISORDER, SINGLE EPISODE, MILD (HCC): ICD-10-CM

## 2024-10-18 DIAGNOSIS — E78.5 HYPERLIPIDEMIA DUE TO TYPE 1 DIABETES MELLITUS  (HCC): ICD-10-CM

## 2024-10-18 DIAGNOSIS — Z00.00 ANNUAL PHYSICAL EXAM: Primary | ICD-10-CM

## 2024-10-18 DIAGNOSIS — E10.69 HYPERLIPIDEMIA DUE TO TYPE 1 DIABETES MELLITUS  (HCC): ICD-10-CM

## 2024-10-18 DIAGNOSIS — Z23 ENCOUNTER FOR IMMUNIZATION: ICD-10-CM

## 2024-10-18 PROCEDURE — 99396 PREV VISIT EST AGE 40-64: CPT

## 2024-10-18 PROCEDURE — 90715 TDAP VACCINE 7 YRS/> IM: CPT

## 2024-10-18 PROCEDURE — 90471 IMMUNIZATION ADMIN: CPT

## 2024-10-18 NOTE — ASSESSMENT & PLAN NOTE
Lipid stable on most recent labs.  Follows with endocrinology.    Lab Results   Component Value Date    HGBA1C 10.6 (H) 04/25/2023

## 2024-10-18 NOTE — TELEPHONE ENCOUNTER
----- Message from Dee GIRALDO sent at 10/18/2024  9:56 AM EDT -----  Regarding: Care Gap Request  10/18/24 9:56 AM    Hello, our patient above has had Diabetic Foot Exam completed/performed. Please assist in updating the patient chart by pulling the document from the Media Tab. The date of service is 09/09/2024.     Thank you,  Dee Pickens, MA  [facility]         Hello, our patient above has had Pap Smear (HPV) aka Cervical Cancer Screening completed/performed. Please assist in updating the patient chart by pulling the document from Encounter Tab within Chart Review. The date of service is 1/24/2024.     Thank you,  SAMMY Sheets [facility]

## 2024-10-18 NOTE — PROGRESS NOTES
Adult Annual Physical  Name: Sherlyn Teran      : 1980      MRN: [id number]  Encounter Provider: ARTHUR Valiente  Encounter Date: 10/18/2024   Encounter department: [facility]    Assessment & Plan  Annual physical exam  Encouraged to start following healthier diabetic diet, exercise regularly.  Return in 1 year for annual physical exam.  Mammogram scheduled for end of this month.  Declines influenza, COVID and pneumococcal vaccines.  OB/GYN visits, Pap, A1c, diabetic foot exam all [facility], will obtain records for care gap.       Encounter for immunization  Tdap vaccine updated today  Orders:    TDAP VACCINE GREATER THAN OR EQUAL TO 8YO IM    Major depressive disorder, single episode, mild (HCC)  Stable, no current occasion regimen.         Hyperlipidemia due to type 1 diabetes mellitus  (HCC)  Lipid stable on most recent labs.  Follows with endocrinology.    Lab Results   Component Value Date    HGBA1C 10.6 (H) 2023            Immunizations and preventive care screenings were discussed with patient today. Appropriate education was printed on patient's after visit summary.    Counseling:  Alcohol/drug use: discussed moderation in alcohol intake, the recommendations for healthy alcohol use, and avoidance of illicit drug use.  Dental Health: discussed importance of regular tooth brushing, flossing, and dental visits.  Exercise: the importance of regular exercise/physical activity was discussed. Recommend exercise 3-5 times per week for at least 30 minutes.          History of Present Illness     Adult Annual Physical:  Patient presents for annual physical. 44-year-old pleasant female  presents for annual physical exam.  She is doing well overall, recently got engaged.  She follows with [facility] and endocrinology for type 1 diabetes.  She admits to poor diet and lack of exercise, she tells me she has seen a diabetic educator in the past and knows  "what she needs to do but is just not doing it.  She is experiencing some inflammation in her hands and lower extremities, chronic.  She tells me she has seen rheumatology twice in the past and had an autoimmune workup done for this which returned unremarkable.  She tells me she feels better when she is under less stress and when she eats .  Agreeable to Tdap vaccine.  No concerns otherwise at this time.  She will return in 1 year for her annual physical exam.  .     Diet and Physical Activity:  - Diet/Nutrition: poor diet.  - Exercise: no formal exercise.    General Health:  - Sleep: sleeps well.  - Hearing: normal hearing bilateral ears.  - Vision: goes for regular eye exams and no vision problems.  - Dental: regular dental visits and brushes teeth once daily.    /GYN Health:  - Follows with GYN: yes.   - Contraception: IUD placement.      Review of Systems   Constitutional: Negative.    HENT: Negative.     Eyes: Negative.    Respiratory: Negative.     Cardiovascular: Negative.    Gastrointestinal: Negative.    Endocrine: Negative.    Genitourinary: Negative.    Musculoskeletal: Negative.    Skin: Negative.    Allergic/Immunologic: Negative.    Neurological: Negative.    Hematological: Negative.    Psychiatric/Behavioral: Negative.         Objective     /78 (BP Location: Left arm, Patient Position: Sitting, Cuff Size: Standard)   Pulse 85   Temp 98.6 °F (37 °C) (Temporal)   Resp 17   Ht 5' 1.75\" (1.568 m)   Wt 63.2 kg (139 lb 6.4 oz)   SpO2 100%   BMI 25.70 kg/m²     Physical Exam  Vitals and nursing note reviewed.   Constitutional:       General: She is not in acute distress.     Appearance: Normal appearance. She is well-developed and normal weight. She is not ill-appearing.   HENT:      Head: Normocephalic and atraumatic.      Right Ear: Tympanic membrane, ear canal and external ear normal.      Left Ear: Tympanic membrane, ear canal and external ear normal.      Nose: Nose normal.      " Mouth/Throat:      Mouth: Mucous membranes are moist.      Pharynx: Oropharynx is clear.   Eyes:      General:         Right eye: No discharge.         Left eye: No discharge.      Conjunctiva/sclera: Conjunctivae normal.      Pupils: Pupils are equal, round, and reactive to light.   Cardiovascular:      Rate and Rhythm: Normal rate and regular rhythm.      Pulses: Normal pulses.      Heart sounds: Normal heart sounds. No murmur heard.  Pulmonary:      Effort: Pulmonary effort is normal. No respiratory distress.      Breath sounds: Normal breath sounds.   Abdominal:      General: Abdomen is flat. Bowel sounds are normal.      Palpations: Abdomen is soft.      Tenderness: There is no abdominal tenderness. There is no guarding.   Musculoskeletal:         General: No swelling. Normal range of motion.      Cervical back: Normal range of motion and neck supple.      Right lower leg: No edema.      Left lower leg: No edema.   Skin:     General: Skin is warm and dry.      Capillary Refill: Capillary refill takes less than 2 seconds.   Neurological:      General: No focal deficit present.      Mental Status: She is alert and oriented to person, place, and time. Mental status is at baseline.   Psychiatric:         Mood and Affect: Mood normal.         Behavior: Behavior normal.         Thought Content: Thought content normal.

## 2024-10-21 NOTE — TELEPHONE ENCOUNTER
Upon review of the In Basket request we were able to locate, review, and update the patient chart as requested for Diabetic Foot Exam.    Any additional questions or concerns should be emailed to the Practice Liaisons via the appropriate education email address, please do not reply via In Basket.    Thank you  Cal Venegas MA   [facility]

## 2024-10-21 NOTE — TELEPHONE ENCOUNTER
Upon review of the In Basket request we were able to locate, review, and update the patient chart as requested for Pap Smear (HPV) aka Cervical Cancer Screening.    Any additional questions or concerns should be emailed to the Practice Liaisons via the appropriate education email address, please do not reply via In Basket.    Thank you  Cal Venegas MA   [facility]

## 2024-11-01 ENCOUNTER — TELEPHONE (OUTPATIENT)
Dept: ADMINISTRATIVE | Facility: OTHER | Age: 44
End: 2024-11-01

## 2024-11-01 NOTE — TELEPHONE ENCOUNTER
----- Message from Dee GIRALDO sent at 11/1/2024 10:55 AM EDT -----  Regarding: Care Gap Request  11/01/24 10:55 AM    Hello, our patient above has had Mammogram completed/performed. Please assist in updating the patient chart by pulling the document from the Media Tab. The date of service is 10/31/2024.     Thank you,  Dee Pickens MA  [facility]

## 2024-11-05 NOTE — TELEPHONE ENCOUNTER
Upon review of the In Basket request we were able to locate, review, and update the patient chart as requested for Mammogram.    Any additional questions or concerns should be emailed to the Practice Liaisons via the appropriate education email address, please do not reply via In Basket.    Thank you  Lena Cordoba MA   [facility]

## 2024-12-12 ENCOUNTER — VBI (OUTPATIENT)
Dept: ADMINISTRATIVE | Facility: OTHER | Age: 44
End: 2024-12-12

## 2024-12-12 NOTE — TELEPHONE ENCOUNTER
12/12/24 7:38 AM     Chart reviewed for Hemoglobin A1c ; nothing is submitted to the patient's insurance at this time.     Aminata Bland MA   [facility]